# Patient Record
Sex: FEMALE | Race: WHITE | Employment: PART TIME | URBAN - NONMETROPOLITAN AREA
[De-identification: names, ages, dates, MRNs, and addresses within clinical notes are randomized per-mention and may not be internally consistent; named-entity substitution may affect disease eponyms.]

---

## 2017-01-19 ENCOUNTER — OFFICE VISIT (OUTPATIENT)
Dept: FAMILY MEDICINE CLINIC | Age: 52
End: 2017-01-19

## 2017-01-19 VITALS
SYSTOLIC BLOOD PRESSURE: 118 MMHG | DIASTOLIC BLOOD PRESSURE: 84 MMHG | WEIGHT: 113 LBS | HEIGHT: 64 IN | BODY MASS INDEX: 19.29 KG/M2 | HEART RATE: 66 BPM

## 2017-01-19 DIAGNOSIS — F17.200 CURRENT EVERY DAY SMOKER: ICD-10-CM

## 2017-01-19 DIAGNOSIS — Z12.4 ENCOUNTER FOR PAPANICOLAOU SMEAR FOR CERVICAL CANCER SCREENING: ICD-10-CM

## 2017-01-19 DIAGNOSIS — F41.9 ANXIETY: ICD-10-CM

## 2017-01-19 DIAGNOSIS — R45.4 IRRITABILITY: ICD-10-CM

## 2017-01-19 DIAGNOSIS — R23.3 EASY BRUISING: ICD-10-CM

## 2017-01-19 DIAGNOSIS — Z00.01 ENCOUNTER FOR WELL ADULT EXAM WITH ABNORMAL FINDINGS: Primary | ICD-10-CM

## 2017-01-19 DIAGNOSIS — R23.2 HOT FLASHES: ICD-10-CM

## 2017-01-19 PROCEDURE — 99396 PREV VISIT EST AGE 40-64: CPT | Performed by: FAMILY MEDICINE

## 2017-01-19 RX ORDER — VENLAFAXINE HYDROCHLORIDE 37.5 MG/1
37.5 CAPSULE, EXTENDED RELEASE ORAL DAILY
Qty: 30 CAPSULE | Refills: 3 | Status: SHIPPED | OUTPATIENT
Start: 2017-01-19 | End: 2017-05-15 | Stop reason: SINTOL

## 2017-01-19 RX ORDER — NICOTINE 21 MG/24HR
1 PATCH, TRANSDERMAL 24 HOURS TRANSDERMAL EVERY 24 HOURS
Qty: 42 PATCH | Refills: 0 | Status: SHIPPED | OUTPATIENT
Start: 2017-01-19 | End: 2017-07-18

## 2017-01-19 ASSESSMENT — ENCOUNTER SYMPTOMS
CONSTIPATION: 0
COUGH: 0
BLURRED VISION: 0
ABDOMINAL PAIN: 0
SHORTNESS OF BREATH: 0
DIARRHEA: 0
NAUSEA: 0
SORE THROAT: 0
EYE PAIN: 0
VOMITING: 0

## 2017-01-23 LAB
HPV, GENOTYPE 16: NEGATIVE
HPV, GENOTYPE 18: NEGATIVE
OTHER HR HPV GENOTYPES: NEGATIVE

## 2017-01-24 LAB — GYNECOLOGY CYTOLOGY REPORT: NORMAL

## 2017-02-15 ENCOUNTER — TELEPHONE (OUTPATIENT)
Dept: FAMILY MEDICINE CLINIC | Age: 52
End: 2017-02-15

## 2017-02-16 DIAGNOSIS — F51.01 PRIMARY INSOMNIA: Primary | ICD-10-CM

## 2017-02-16 RX ORDER — QUETIAPINE FUMARATE 200 MG/1
200 TABLET, FILM COATED ORAL NIGHTLY
Qty: 30 TABLET | Refills: 2 | Status: SHIPPED | OUTPATIENT
Start: 2017-02-16 | End: 2017-06-19 | Stop reason: SDUPTHER

## 2017-05-15 ENCOUNTER — OFFICE VISIT (OUTPATIENT)
Dept: FAMILY MEDICINE CLINIC | Age: 52
End: 2017-05-15

## 2017-05-15 VITALS
HEIGHT: 64 IN | WEIGHT: 116 LBS | HEART RATE: 66 BPM | BODY MASS INDEX: 19.81 KG/M2 | DIASTOLIC BLOOD PRESSURE: 84 MMHG | SYSTOLIC BLOOD PRESSURE: 128 MMHG

## 2017-05-15 DIAGNOSIS — R68.82 LOW LIBIDO: Primary | ICD-10-CM

## 2017-05-15 DIAGNOSIS — N95.1 HOT FLASHES DUE TO MENOPAUSE: ICD-10-CM

## 2017-05-15 PROCEDURE — 99213 OFFICE O/P EST LOW 20 MIN: CPT | Performed by: FAMILY MEDICINE

## 2017-05-15 ASSESSMENT — ENCOUNTER SYMPTOMS
VOMITING: 0
NAUSEA: 0

## 2017-05-16 ENCOUNTER — TELEPHONE (OUTPATIENT)
Dept: FAMILY MEDICINE CLINIC | Age: 52
End: 2017-05-16

## 2017-05-16 LAB
ABSOLUTE BASO #: 0.1 K/UL (ref 0–0.1)
ABSOLUTE EOS #: 0.2 K/UL (ref 0.1–0.4)
ABSOLUTE LYMPH #: 2.5 K/UL (ref 0.8–5.2)
ABSOLUTE MONO #: 0.5 K/UL (ref 0.1–0.9)
ABSOLUTE NEUT #: 4.6 K/UL (ref 1.3–9.1)
ALBUMIN SERPL-MCNC: 4.3 G/DL (ref 3.2–5.3)
ALK PHOSPHATASE: 94 IU/L (ref 35–121)
ALT SERPL-CCNC: 14 IU/L (ref 5–59)
ANION GAP SERPL CALCULATED.3IONS-SCNC: 14 MMOL/L
AST SERPL-CCNC: 22 IU/L (ref 10–42)
BASOPHILS RELATIVE PERCENT: 0.9 %
BILIRUB SERPL-MCNC: 0.8 MG/DL (ref 0.2–1.3)
BUN BLDV-MCNC: 11 MG/DL (ref 10–20)
CALCIUM SERPL-MCNC: 9.4 MG/DL (ref 8.7–10.8)
CHLORIDE BLD-SCNC: 104 MMOL/L (ref 95–111)
CHOLESTEROL/HDL RATIO: 3.5
CHOLESTEROL: 188 MG/DL
CO2: 27 MMOL/L (ref 21–32)
CREAT SERPL-MCNC: 0.7 MG/DL (ref 0.5–1.3)
EGFR AFRICAN AMERICAN: 107
EGFR IF NONAFRICAN AMERICAN: 89
EOSINOPHILS RELATIVE PERCENT: 3 %
GLUCOSE: 77 MG/DL (ref 70–100)
HCT VFR BLD CALC: 42.9 % (ref 36–48)
HDLC SERPL-MCNC: 54 MG/DL (ref 40–60)
HEMOGLOBIN: 14.1 G/DL (ref 12–16)
LDL CHOLESTEROL CALCULATED: 86 MG/DL
LDL/HDL RATIO: 1.6
LYMPHOCYTE %: 31.7 %
MCH RBC QN AUTO: 29.9 PG (ref 27–34)
MCHC RBC AUTO-ENTMCNC: 32.9 G/DL (ref 31–36)
MCV RBC AUTO: 91.1 FL (ref 80–100)
MONOCYTES # BLD: 6.8 %
NEUTROPHILS RELATIVE PERCENT: 57.2 %
PDW BLD-RTO: 13.1 % (ref 10.8–14.8)
PLATELETS: 219 K/UL (ref 150–450)
POTASSIUM SERPL-SCNC: 4.1 MMOL/L (ref 3.5–5.4)
RBC: 4.71 M/UL (ref 4–5.5)
SODIUM BLD-SCNC: 141 MMOL/L (ref 134–147)
TOTAL PROTEIN: 7.2 G/DL (ref 5.8–8)
TRIGL SERPL-MCNC: 240 MG/DL
VLDLC SERPL CALC-MCNC: 48 MG/DL
WBC: 8 K/UL (ref 3.7–10.8)

## 2017-06-19 ENCOUNTER — OFFICE VISIT (OUTPATIENT)
Dept: FAMILY MEDICINE CLINIC | Age: 52
End: 2017-06-19

## 2017-06-19 VITALS
DIASTOLIC BLOOD PRESSURE: 70 MMHG | HEART RATE: 68 BPM | WEIGHT: 116 LBS | BODY MASS INDEX: 19.91 KG/M2 | SYSTOLIC BLOOD PRESSURE: 128 MMHG

## 2017-06-19 DIAGNOSIS — M54.12 CERVICAL RADICULOPATHY: Primary | ICD-10-CM

## 2017-06-19 DIAGNOSIS — M54.2 NECK PAIN ON LEFT SIDE: ICD-10-CM

## 2017-06-19 DIAGNOSIS — F51.01 PRIMARY INSOMNIA: ICD-10-CM

## 2017-06-19 PROCEDURE — 99213 OFFICE O/P EST LOW 20 MIN: CPT | Performed by: FAMILY MEDICINE

## 2017-06-19 RX ORDER — MULTIVIT-MIN/IRON FUM/FOLIC AC 7.5 MG-4
1 TABLET ORAL DAILY
Qty: 30 TABLET | Refills: 3 | Status: SHIPPED | OUTPATIENT
Start: 2017-06-19 | End: 2017-10-20 | Stop reason: SDUPTHER

## 2017-06-19 RX ORDER — CYCLOBENZAPRINE HCL 10 MG
10 TABLET ORAL 3 TIMES DAILY PRN
Qty: 30 TABLET | Refills: 0 | Status: SHIPPED | OUTPATIENT
Start: 2017-06-19 | End: 2017-06-29

## 2017-06-19 RX ORDER — QUETIAPINE FUMARATE 200 MG/1
200 TABLET, FILM COATED ORAL NIGHTLY
Qty: 30 TABLET | Refills: 2 | Status: SHIPPED | OUTPATIENT
Start: 2017-06-19 | End: 2017-09-21 | Stop reason: SDUPTHER

## 2017-06-20 ENCOUNTER — TELEPHONE (OUTPATIENT)
Dept: FAMILY MEDICINE CLINIC | Age: 52
End: 2017-06-20

## 2017-07-18 ENCOUNTER — OFFICE VISIT (OUTPATIENT)
Dept: FAMILY MEDICINE CLINIC | Age: 52
End: 2017-07-18
Payer: MEDICARE

## 2017-07-18 VITALS
BODY MASS INDEX: 18.95 KG/M2 | HEART RATE: 70 BPM | WEIGHT: 111 LBS | SYSTOLIC BLOOD PRESSURE: 110 MMHG | HEIGHT: 64 IN | DIASTOLIC BLOOD PRESSURE: 66 MMHG

## 2017-07-18 DIAGNOSIS — R45.4 IRRITABILITY: ICD-10-CM

## 2017-07-18 DIAGNOSIS — R23.2 HOT FLASHES: ICD-10-CM

## 2017-07-18 DIAGNOSIS — J42 CHRONIC BRONCHITIS, UNSPECIFIED CHRONIC BRONCHITIS TYPE (HCC): Primary | ICD-10-CM

## 2017-07-18 DIAGNOSIS — F41.9 ANXIETY: ICD-10-CM

## 2017-07-18 PROCEDURE — 99213 OFFICE O/P EST LOW 20 MIN: CPT | Performed by: FAMILY MEDICINE

## 2017-07-18 RX ORDER — ALBUTEROL SULFATE 90 UG/1
2 AEROSOL, METERED RESPIRATORY (INHALATION) EVERY 4 HOURS PRN
Qty: 1 INHALER | Refills: 5 | Status: SHIPPED | OUTPATIENT
Start: 2017-07-18 | End: 2021-10-05 | Stop reason: SDUPTHER

## 2017-07-18 RX ORDER — VENLAFAXINE HYDROCHLORIDE 37.5 MG/1
CAPSULE, EXTENDED RELEASE ORAL
Qty: 60 CAPSULE | Refills: 2 | Status: SHIPPED | OUTPATIENT
Start: 2017-07-18 | End: 2017-10-20 | Stop reason: SDUPTHER

## 2017-07-18 RX ORDER — BUDESONIDE AND FORMOTEROL FUMARATE DIHYDRATE 160; 4.5 UG/1; UG/1
2 AEROSOL RESPIRATORY (INHALATION) 2 TIMES DAILY
Qty: 1 INHALER | Refills: 5 | Status: SHIPPED | OUTPATIENT
Start: 2017-07-18 | End: 2018-02-07 | Stop reason: SDUPTHER

## 2017-07-18 ASSESSMENT — ENCOUNTER SYMPTOMS
CHEST TIGHTNESS: 0
SHORTNESS OF BREATH: 0
FREQUENT THROAT CLEARING: 0
TROUBLE SWALLOWING: 0
HEMOPTYSIS: 0
COLOR CHANGE: 0
WHEEZING: 1
RHINORRHEA: 0
VOMITING: 0
COUGH: 1
NAUSEA: 0

## 2017-07-18 ASSESSMENT — COPD QUESTIONNAIRES: COPD: 1

## 2017-08-01 ENCOUNTER — OFFICE VISIT (OUTPATIENT)
Dept: FAMILY MEDICINE CLINIC | Age: 52
End: 2017-08-01
Payer: MEDICARE

## 2017-08-01 VITALS
DIASTOLIC BLOOD PRESSURE: 70 MMHG | TEMPERATURE: 98.6 F | BODY MASS INDEX: 19.12 KG/M2 | HEART RATE: 62 BPM | SYSTOLIC BLOOD PRESSURE: 100 MMHG | HEIGHT: 64 IN | WEIGHT: 112 LBS

## 2017-08-01 DIAGNOSIS — R35.0 URINARY FREQUENCY: ICD-10-CM

## 2017-08-01 DIAGNOSIS — J44.1 COPD EXACERBATION (HCC): Primary | ICD-10-CM

## 2017-08-01 LAB
BILIRUBIN, POC: NEGATIVE
BLOOD URINE, POC: NEGATIVE
CLARITY, POC: CLEAR
COLOR, POC: YELLOW
GLUCOSE URINE, POC: NEGATIVE
KETONES, POC: NEGATIVE
LEUKOCYTE EST, POC: NORMAL
NITRITE, POC: NEGATIVE
PH, POC: 5.5
PROTEIN, POC: NEGATIVE
SPECIFIC GRAVITY, POC: 1.02
UROBILINOGEN, POC: 0.2

## 2017-08-01 PROCEDURE — 99213 OFFICE O/P EST LOW 20 MIN: CPT | Performed by: FAMILY MEDICINE

## 2017-08-01 RX ORDER — PREDNISONE 20 MG/1
40 TABLET ORAL DAILY
Qty: 10 TABLET | Refills: 0 | Status: SHIPPED | OUTPATIENT
Start: 2017-08-01 | End: 2018-04-04

## 2017-08-01 RX ORDER — AZITHROMYCIN 250 MG/1
TABLET, FILM COATED ORAL
Qty: 6 TABLET | Refills: 0 | Status: SHIPPED | OUTPATIENT
Start: 2017-08-01 | End: 2017-10-17 | Stop reason: ALTCHOICE

## 2017-08-01 ASSESSMENT — ENCOUNTER SYMPTOMS
SORE THROAT: 1
RHINORRHEA: 1
HEARTBURN: 0
DIARRHEA: 0
CHEST TIGHTNESS: 0
COLOR CHANGE: 0
COUGH: 1
VOMITING: 0
SHORTNESS OF BREATH: 1
HEMOPTYSIS: 0

## 2017-09-21 DIAGNOSIS — F51.01 PRIMARY INSOMNIA: ICD-10-CM

## 2017-09-21 RX ORDER — QUETIAPINE FUMARATE 200 MG/1
TABLET, FILM COATED ORAL
Qty: 30 TABLET | Refills: 2 | Status: SHIPPED | OUTPATIENT
Start: 2017-09-21 | End: 2017-12-31 | Stop reason: SDUPTHER

## 2017-10-17 ENCOUNTER — OFFICE VISIT (OUTPATIENT)
Dept: FAMILY MEDICINE CLINIC | Age: 52
End: 2017-10-17
Payer: MEDICARE

## 2017-10-17 VITALS
TEMPERATURE: 98.5 F | BODY MASS INDEX: 18.95 KG/M2 | SYSTOLIC BLOOD PRESSURE: 116 MMHG | WEIGHT: 111 LBS | HEIGHT: 64 IN | HEART RATE: 54 BPM | DIASTOLIC BLOOD PRESSURE: 70 MMHG

## 2017-10-17 DIAGNOSIS — J06.9 VIRAL URI: Primary | ICD-10-CM

## 2017-10-17 PROCEDURE — 99212 OFFICE O/P EST SF 10 MIN: CPT | Performed by: FAMILY MEDICINE

## 2017-10-17 ASSESSMENT — ENCOUNTER SYMPTOMS
SHORTNESS OF BREATH: 0
COUGH: 0
WHEEZING: 0
SORE THROAT: 1

## 2017-10-17 NOTE — PROGRESS NOTES
Known Allergies    Objective:     /70 (Site: Right Arm, Position: Sitting, Cuff Size: Medium Adult)   Pulse 54   Ht 5' 4\" (1.626 m)   Wt 111 lb (50.3 kg)   BMI 19.05 kg/m²   Physical Exam   Constitutional: She is oriented to person, place, and time. She appears cachectic. No distress. Cardiovascular: Bradycardia present. No murmur heard. Pulmonary/Chest: Effort normal and breath sounds normal. No respiratory distress. She has no wheezes. Lymphadenopathy:     She has cervical adenopathy. Neurological: She is alert and oriented to person, place, and time. Vitals reviewed. Impression/Plan:  1. Viral URI  -symptomatic management  -antibiotics are not indicated  -hydration and rest.    -tylenol prn    Educational materials given - see patient instructions. All questions answered. Pt voiced understanding. They agreed with treatment plan. Return in about 2 months (around 12/17/2017) for COPD/sleep.        Electronically signed by Elio Cisneros MD on 10/17/2017 at 11:26 AM

## 2017-10-20 DIAGNOSIS — F41.9 ANXIETY: ICD-10-CM

## 2017-10-20 DIAGNOSIS — R45.4 IRRITABILITY: ICD-10-CM

## 2017-10-20 DIAGNOSIS — R23.2 HOT FLASHES: ICD-10-CM

## 2017-10-20 RX ORDER — VENLAFAXINE HYDROCHLORIDE 37.5 MG/1
CAPSULE, EXTENDED RELEASE ORAL
Qty: 60 CAPSULE | Refills: 2 | Status: SHIPPED | OUTPATIENT
Start: 2017-10-20 | End: 2018-01-30 | Stop reason: SDUPTHER

## 2017-10-20 RX ORDER — FOLIC ACID/MULTIVIT,IRON,MINER 0.4MG-18MG
TABLET ORAL
Qty: 30 TABLET | Refills: 3 | Status: SHIPPED | OUTPATIENT
Start: 2017-10-20 | End: 2018-02-07 | Stop reason: SDUPTHER

## 2017-10-20 NOTE — TELEPHONE ENCOUNTER
Bevely Givens called requesting a refill on the following medications:  Requested Prescriptions     Pending Prescriptions Disp Refills    venlafaxine (EFFEXOR XR) 37.5 MG extended release capsule [Pharmacy Med Name: VENLAFAXINE HCL ER 37.5 MG CAP] 60 capsule 2     Sig: TAKE 1 CAPSULE DAILY FOR 1 WEEK THEN INCREASE TO 2 CAPSULES DAILY    Multiple Vitamins-Minerals (RA ONE DAILY MAXIMUM) TABS [Pharmacy Med Name: RA ONE DAILY MAXIMUM TABLET] 30 tablet 3     Sig: take 1 tablet by mouth once daily       Date of last visit: 10/17/2017  Date of next visit (if applicable):12/18/2017  Date of last refill:   Pharmacy Name:

## 2017-11-13 RX ORDER — OMEPRAZOLE 20 MG/1
20 CAPSULE, DELAYED RELEASE ORAL DAILY
Qty: 30 CAPSULE | Refills: 3 | Status: SHIPPED | OUTPATIENT
Start: 2017-11-13 | End: 2018-03-15 | Stop reason: SDUPTHER

## 2017-11-13 NOTE — TELEPHONE ENCOUNTER
Queen Melissa called requesting a refill on the following medications:  Requested Prescriptions     Pending Prescriptions Disp Refills    omeprazole (PRILOSEC) 20 MG delayed release capsule 30 capsule      Sig: Take 1 capsule by mouth daily     Pharmacy verified:  [email or web address]       Date of last visit: 10-17-17   Date of next visit (if applicable): 61/53/7138        Date of last fill and quantity (to be completed by clinical staff)  Pharm[facility] name: [facility]

## 2017-12-11 ENCOUNTER — TELEPHONE (OUTPATIENT)
Dept: FAMILY MEDICINE CLINIC | Age: 52
End: 2017-12-11

## 2017-12-11 DIAGNOSIS — N64.4 BREAST TENDERNESS: Primary | ICD-10-CM

## 2017-12-12 NOTE — TELEPHONE ENCOUNTER
Spoke with Dante Oquendo- states that both breasts are tender and she has had \"benign lumps\" in the past.

## 2017-12-14 ENCOUNTER — TELEPHONE (OUTPATIENT)
Dept: FAMILY MEDICINE CLINIC | Age: 52
End: 2017-12-14

## 2017-12-14 DIAGNOSIS — N64.4 BREAST TENDERNESS: Primary | ICD-10-CM

## 2017-12-14 NOTE — TELEPHONE ENCOUNTER
Maame from [facility] called and Toni Davidson has an appointment Monday for a Mammo. Divina Miguel said she needs the order to be for the diagnostic Mammo, and also say bilateral US with diagnosis of breast tenderness.

## 2017-12-31 DIAGNOSIS — F51.01 PRIMARY INSOMNIA: ICD-10-CM

## 2018-01-02 ENCOUNTER — TELEPHONE (OUTPATIENT)
Dept: FAMILY MEDICINE CLINIC | Age: 53
End: 2018-01-02

## 2018-01-02 RX ORDER — ACETAMINOPHEN 160 MG
TABLET,CHEWABLE ORAL
Qty: 365 TABLET | Refills: 0 | Status: SHIPPED | OUTPATIENT
Start: 2018-01-02 | End: 2018-12-08 | Stop reason: SDUPTHER

## 2018-01-02 RX ORDER — QUETIAPINE FUMARATE 200 MG/1
TABLET, FILM COATED ORAL
Qty: 90 TABLET | Refills: 0 | Status: SHIPPED | OUTPATIENT
Start: 2018-01-02 | End: 2018-03-15 | Stop reason: SDUPTHER

## 2018-01-13 ENCOUNTER — HOSPITAL ENCOUNTER (EMERGENCY)
Age: 53
Discharge: HOME | End: 2018-01-13
Attending: NURSE PRACTITIONER
Payer: MEDICARE

## 2018-01-13 VITALS
OXYGEN SATURATION: 99 % | DIASTOLIC BLOOD PRESSURE: 60 MMHG | RESPIRATION RATE: 16 BRPM | HEART RATE: 80 BPM | SYSTOLIC BLOOD PRESSURE: 134 MMHG | WEIGHT: 115 LBS | TEMPERATURE: 97.1 F | HEIGHT: 64 IN | BODY MASS INDEX: 19.63 KG/M2

## 2018-01-13 DIAGNOSIS — B86 SCABIES: Primary | ICD-10-CM

## 2018-01-13 PROCEDURE — 99213 OFFICE O/P EST LOW 20 MIN: CPT | Performed by: NURSE PRACTITIONER

## 2018-01-13 PROCEDURE — 99212 OFFICE O/P EST SF 10 MIN: CPT

## 2018-01-13 RX ORDER — PERMETHRIN 50 MG/G
CREAM TOPICAL
Qty: 1 TUBE | Refills: 1 | Status: SHIPPED | OUTPATIENT
Start: 2018-01-13 | End: 2018-12-20

## 2018-01-13 ASSESSMENT — PAIN DESCRIPTION - FREQUENCY: FREQUENCY: CONTINUOUS

## 2018-01-13 ASSESSMENT — PAIN DESCRIPTION - PROGRESSION: CLINICAL_PROGRESSION: NOT CHANGED

## 2018-01-13 ASSESSMENT — PAIN DESCRIPTION - LOCATION: LOCATION: BACK

## 2018-01-13 ASSESSMENT — PAIN SCALES - GENERAL: PAINLEVEL_OUTOF10: 1

## 2018-01-13 ASSESSMENT — PAIN DESCRIPTION - ONSET: ONSET: GRADUAL

## 2018-01-13 ASSESSMENT — PAIN DESCRIPTION - DESCRIPTORS: DESCRIPTORS: SHARP

## 2018-01-13 NOTE — ED PROVIDER NOTES
predniSONE (DELTASONE) 20 MG tablet Take 2 tablets by mouth daily, Disp-10 tablet, R-0Normal      budesonide-formoterol (SYMBICORT) 160-4.5 MCG/ACT AERO Inhale 2 puffs into the lungs 2 times daily, Disp-1 Inhaler, R-5Normal      albuterol sulfate HFA (VENTOLIN HFA) 108 (90 Base) MCG/ACT inhaler Inhale 2 puffs into the lungs every 4 hours as needed for Wheezing, Disp-1 Inhaler, R-5Normal             ALLERGIES     Patient is has No Known Allergies. FAMILY HISTORY     Patient's family history includes Cirrhosis in her maternal uncle; Dementia in her father; Dejuan Blackmon in her mother. SOCIAL HISTORY     Patient  reports that she has been smoking Cigarettes. She has a 37.00 pack-year smoking history. She has never used smokeless tobacco. She reports that she drinks alcohol. She reports that she does not use drugs. PHYSICAL EXAM     ED TRIAGE VITALS  BP: 134/60, Temp: 97.1 °F (36.2 °C), Pulse: 80, Resp: 16, SpO2: 99 %  Physical Exam   Constitutional: She is oriented to person, place, and time. She appears well-developed and well-nourished. No distress. HENT:   Head: Normocephalic. Cardiovascular: Normal rate, regular rhythm and normal heart sounds. Exam reveals no gallop and no friction rub. No murmur heard. Neurological: She is alert and oriented to person, place, and time. Skin: Skin is warm and dry. Rash noted. Rash is papular. Mildly erythematous papular lesions measuring not more than 1-2 mm widely scattered over the back with tracking patterns   Psychiatric: She has a normal mood and affect. Nursing note and vitals reviewed. DIAGNOSTIC RESULTS   Labs:No results found for this visit on 01/13/18.     IMAGING:    URGENT CARE COURSE:     Vitals:    01/13/18 1055   BP: 134/60   Pulse: 80   Resp: 16   Temp: 97.1 °F (36.2 °C)   TempSrc: Temporal   SpO2: 99%   Weight: 115 lb (52.2 kg)   Height: 5' 4\" (1.626 m)       Medications - No data to display  PROCEDURES:  None  FINAL IMPRESSION 1. Scabies        DISPOSITION/PLAN   DISPOSITION Decision To Discharge 01/13/2018 11:11:52 AM   I explained to the patient's pattern of the rash is typical of scabies. I recommended she try Elimite. I'm going to give her a prescription with one refill. I did explain how to use the lotion. She may continue to use and non-fragranced cream or lotion to her skin daily as needed. Follow-up with her primary care provider if symptoms aren't improving or any other concerns.     PATIENT REFERRED TO:  Leland Bianchi MD  [address]  [phone]    Schedule an appointment as soon as possible for a visit   As needed    DISCHARGE MEDICATIONS:  Discharge Medication List as of 1/13/2018 11:14 AM      START taking these medications    Details   permethrin (ELIMITE) 5 % cream Apply topically as directed  May repeat in 7 days if new lesions, Disp-1 Tube, R-1, Print           Discharge Medication List as of 1/13/2018 11:14 AM          LIONEL Knight CNP  01/13/18 4034

## 2018-01-25 ENCOUNTER — OFFICE VISIT (OUTPATIENT)
Dept: FAMILY MEDICINE CLINIC | Age: 53
End: 2018-01-25
Payer: MEDICARE

## 2018-01-25 VITALS
DIASTOLIC BLOOD PRESSURE: 70 MMHG | HEART RATE: 62 BPM | HEIGHT: 64 IN | WEIGHT: 113 LBS | SYSTOLIC BLOOD PRESSURE: 110 MMHG | BODY MASS INDEX: 19.29 KG/M2

## 2018-01-25 DIAGNOSIS — B86 SCABIES: Primary | ICD-10-CM

## 2018-01-25 PROCEDURE — G8427 DOCREV CUR MEDS BY ELIG CLIN: HCPCS | Performed by: FAMILY MEDICINE

## 2018-01-25 PROCEDURE — 3014F SCREEN MAMMO DOC REV: CPT | Performed by: FAMILY MEDICINE

## 2018-01-25 PROCEDURE — G8420 CALC BMI NORM PARAMETERS: HCPCS | Performed by: FAMILY MEDICINE

## 2018-01-25 PROCEDURE — G8482 FLU IMMUNIZE ORDER/ADMIN: HCPCS | Performed by: FAMILY MEDICINE

## 2018-01-25 PROCEDURE — 3017F COLORECTAL CA SCREEN DOC REV: CPT | Performed by: FAMILY MEDICINE

## 2018-01-25 PROCEDURE — 4004F PT TOBACCO SCREEN RCVD TLK: CPT | Performed by: FAMILY MEDICINE

## 2018-01-25 PROCEDURE — 99213 OFFICE O/P EST LOW 20 MIN: CPT | Performed by: FAMILY MEDICINE

## 2018-01-25 RX ORDER — PREDNISONE 20 MG/1
40 TABLET ORAL DAILY
Qty: 10 TABLET | Refills: 0 | Status: SHIPPED | OUTPATIENT
Start: 2018-01-25 | End: 2018-02-09 | Stop reason: SDUPTHER

## 2018-01-25 RX ORDER — IVERMECTIN 3 MG/1
TABLET ORAL
Qty: 6 TABLET | Refills: 0 | Status: SHIPPED | OUTPATIENT
Start: 2018-01-25 | End: 2018-04-16 | Stop reason: ALTCHOICE

## 2018-01-25 ASSESSMENT — ENCOUNTER SYMPTOMS
RHINORRHEA: 0
SHORTNESS OF BREATH: 0
SORE THROAT: 0
DIARRHEA: 0
VOMITING: 0
CHEST TIGHTNESS: 0

## 2018-01-25 NOTE — PATIENT INSTRUCTIONS
help?  Call your doctor now or seek immediate medical care if:  ? · You have signs of infection, such as:  ¨ Increased pain, swelling, warmth, or redness. ¨ Red streaks leading from the mite bites. ¨ Pus draining from a bite area. ¨ A fever. ? Watch closely for changes in your health, and be sure to contact your doctor if:  ? · Anyone else in your family has itching. ? · You do not get better within 2 weeks. Where can you learn more? Go to https://SilMach.Eximia. org and sign in to your KAL account. Enter U497 in the [employer] box to learn more about \"Scabies: Care Instructions. \"     If you do not have an account, please click on the \"Sign Up Now\" link. Current as of: October 13, 2016  Content Version: 11.5  © 3335-7975 Healthwise, Incorporated. Care instructions adapted under license by [facility] ([facility]). If you have questions about a medical condition or this instruction, always ask your healthcare professional. [address] any warranty or liability for your use of this information.

## 2018-01-25 NOTE — PROGRESS NOTES
this visit:    Scabies  -     ivermectin 3 MG TABS; Take 9 mg once and repeat in 1 week. -     predniSONE (DELTASONE) 20 MG tablet; Take 2 tablets by mouth daily    Patient has used Elimite without success so will prescribe Ivermectin and a course of steroids. Advised Benadryl PRN itching. Return if symptoms worsen or fail to improve.     Electronically signed by Edmundo Cristina MD on 1/25/2018 at 10:03 AM

## 2018-01-30 DIAGNOSIS — R45.4 IRRITABILITY: ICD-10-CM

## 2018-01-30 DIAGNOSIS — F41.9 ANXIETY: ICD-10-CM

## 2018-01-30 DIAGNOSIS — R23.2 HOT FLASHES: ICD-10-CM

## 2018-01-30 RX ORDER — VENLAFAXINE HYDROCHLORIDE 37.5 MG/1
CAPSULE, EXTENDED RELEASE ORAL
Qty: 60 CAPSULE | Refills: 2 | Status: SHIPPED | OUTPATIENT
Start: 2018-01-30 | End: 2018-05-02 | Stop reason: SDUPTHER

## 2018-01-31 ENCOUNTER — TELEPHONE (OUTPATIENT)
Dept: FAMILY MEDICINE CLINIC | Age: 53
End: 2018-01-31

## 2018-01-31 DIAGNOSIS — R05.9 COUGH: Primary | ICD-10-CM

## 2018-02-01 RX ORDER — BENZONATATE 100 MG/1
100 CAPSULE ORAL 3 TIMES DAILY PRN
Qty: 21 CAPSULE | Refills: 0 | Status: SHIPPED | OUTPATIENT
Start: 2018-02-01 | End: 2018-02-08

## 2018-02-02 ENCOUNTER — OFFICE VISIT (OUTPATIENT)
Dept: FAMILY MEDICINE CLINIC | Age: 53
End: 2018-02-02
Payer: MEDICARE

## 2018-02-02 VITALS
HEART RATE: 68 BPM | BODY MASS INDEX: 19.29 KG/M2 | HEIGHT: 64 IN | DIASTOLIC BLOOD PRESSURE: 80 MMHG | SYSTOLIC BLOOD PRESSURE: 98 MMHG | WEIGHT: 113 LBS | TEMPERATURE: 97.9 F

## 2018-02-02 DIAGNOSIS — A08.4 VIRAL GASTROENTERITIS: Primary | ICD-10-CM

## 2018-02-02 DIAGNOSIS — J06.9 VIRAL URI: ICD-10-CM

## 2018-02-02 PROCEDURE — 4004F PT TOBACCO SCREEN RCVD TLK: CPT | Performed by: FAMILY MEDICINE

## 2018-02-02 PROCEDURE — 3017F COLORECTAL CA SCREEN DOC REV: CPT | Performed by: FAMILY MEDICINE

## 2018-02-02 PROCEDURE — G8427 DOCREV CUR MEDS BY ELIG CLIN: HCPCS | Performed by: FAMILY MEDICINE

## 2018-02-02 PROCEDURE — G8482 FLU IMMUNIZE ORDER/ADMIN: HCPCS | Performed by: FAMILY MEDICINE

## 2018-02-02 PROCEDURE — 3014F SCREEN MAMMO DOC REV: CPT | Performed by: FAMILY MEDICINE

## 2018-02-02 PROCEDURE — 99213 OFFICE O/P EST LOW 20 MIN: CPT | Performed by: FAMILY MEDICINE

## 2018-02-02 PROCEDURE — G8420 CALC BMI NORM PARAMETERS: HCPCS | Performed by: FAMILY MEDICINE

## 2018-02-02 ASSESSMENT — ENCOUNTER SYMPTOMS
SORE THROAT: 0
SHORTNESS OF BREATH: 0
RHINORRHEA: 1
VOMITING: 1
DIARRHEA: 1
ABDOMINAL PAIN: 0
COUGH: 1

## 2018-02-02 NOTE — PATIENT INSTRUCTIONS
Infection (Cold): Care Instructions  Your Care Instructions    An upper respiratory infection, or URI, is an infection of the nose, sinuses, or throat. URIs are spread by coughs, sneezes, and direct contact. The common cold is the most frequent kind of URI. The flu and sinus infections are other kinds of URIs. Almost all URIs are caused by viruses. Antibiotics won't cure them. But you can treat most infections with home care. This may include drinking lots of fluids and taking over-the-counter pain medicine. You will probably feel better in 4 to 10 days. The doctor has checked you carefully, but problems can develop later. If you notice any problems or new symptoms, get medical treatment right away. Follow-up care is a key part of your treatment and safety. Be sure to make and go to all appointments, and call your doctor if you are having problems. It's also a good idea to know your test results and keep a list of the medicines you take. How can you care for yourself at home? · To prevent dehydration, drink plenty of fluids, enough so that your urine is light yellow or clear like water. Choose water and other caffeine-free clear liquids until you feel better. If you have kidney, heart, or liver disease and have to limit fluids, talk with your doctor before you increase the amount of fluids you drink. · Take an over-the-counter pain medicine, such as acetaminophen (Tylenol), ibuprofen (Advil, Motrin), or naproxen (Aleve). Read and follow all instructions on the label. · Before you use cough and cold medicines, check the label. These medicines may not be safe for young children or for people with certain health problems. · Be careful when taking over-the-counter cold or flu medicines and Tylenol at the same time. Many of these medicines have acetaminophen, which is Tylenol. Read the labels to make sure that you are not taking more than the recommended dose.  Too much acetaminophen (Tylenol) can be

## 2018-02-07 ENCOUNTER — TELEPHONE (OUTPATIENT)
Dept: FAMILY MEDICINE CLINIC | Age: 53
End: 2018-02-07

## 2018-02-07 DIAGNOSIS — R05.9 COUGH: Primary | ICD-10-CM

## 2018-02-07 DIAGNOSIS — J42 CHRONIC BRONCHITIS, UNSPECIFIED CHRONIC BRONCHITIS TYPE (HCC): ICD-10-CM

## 2018-02-07 RX ORDER — BUDESONIDE AND FORMOTEROL FUMARATE DIHYDRATE 160; 4.5 UG/1; UG/1
AEROSOL RESPIRATORY (INHALATION)
Qty: 10.2 G | Refills: 5 | Status: SHIPPED | OUTPATIENT
Start: 2018-02-07 | End: 2021-10-05 | Stop reason: SDUPTHER

## 2018-02-07 RX ORDER — BROMPHENIRAMINE MALEATE, PSEUDOEPHEDRINE HYDROCHLORIDE, AND DEXTROMETHORPHAN HYDROBROMIDE 2; 30; 10 MG/5ML; MG/5ML; MG/5ML
5 SYRUP ORAL 4 TIMES DAILY PRN
Qty: 140 ML | Refills: 0 | Status: SHIPPED | OUTPATIENT
Start: 2018-02-07 | End: 2018-02-14

## 2018-02-07 RX ORDER — FOLIC ACID/MULTIVIT,IRON,MINER 0.4MG-18MG
TABLET ORAL
Qty: 30 TABLET | Refills: 3 | Status: SHIPPED | OUTPATIENT
Start: 2018-02-07 | End: 2018-07-24 | Stop reason: SDUPTHER

## 2018-02-09 ENCOUNTER — TELEPHONE (OUTPATIENT)
Dept: FAMILY MEDICINE CLINIC | Age: 53
End: 2018-02-09

## 2018-02-09 DIAGNOSIS — B86 SCABIES: ICD-10-CM

## 2018-02-09 RX ORDER — PREDNISONE 20 MG/1
40 TABLET ORAL DAILY
Qty: 10 TABLET | Refills: 0 | Status: SHIPPED | OUTPATIENT
Start: 2018-02-09 | End: 2018-04-04

## 2018-02-09 NOTE — TELEPHONE ENCOUNTER
A course of steroids was sent in. If rash does not improve with this, will likely need to see dermatology.

## 2018-03-15 DIAGNOSIS — F51.01 PRIMARY INSOMNIA: ICD-10-CM

## 2018-03-15 RX ORDER — QUETIAPINE FUMARATE 200 MG/1
TABLET, FILM COATED ORAL
Qty: 90 TABLET | Refills: 0 | Status: SHIPPED | OUTPATIENT
Start: 2018-03-15 | End: 2018-07-24 | Stop reason: SDUPTHER

## 2018-03-15 RX ORDER — OMEPRAZOLE 20 MG/1
CAPSULE, DELAYED RELEASE ORAL
Qty: 30 CAPSULE | Refills: 3 | Status: SHIPPED | OUTPATIENT
Start: 2018-03-15 | End: 2018-09-07 | Stop reason: SDUPTHER

## 2018-04-04 ENCOUNTER — OFFICE VISIT (OUTPATIENT)
Dept: FAMILY MEDICINE CLINIC | Age: 53
End: 2018-04-04
Payer: MEDICARE

## 2018-04-04 VITALS
BODY MASS INDEX: 19.63 KG/M2 | WEIGHT: 115 LBS | HEIGHT: 64 IN | DIASTOLIC BLOOD PRESSURE: 62 MMHG | TEMPERATURE: 98.3 F | SYSTOLIC BLOOD PRESSURE: 108 MMHG | HEART RATE: 70 BPM

## 2018-04-04 DIAGNOSIS — J44.1 COPD EXACERBATION (HCC): Primary | ICD-10-CM

## 2018-04-04 DIAGNOSIS — F17.200 CURRENT EVERY DAY SMOKER: ICD-10-CM

## 2018-04-04 PROCEDURE — 3023F SPIROM DOC REV: CPT | Performed by: FAMILY MEDICINE

## 2018-04-04 PROCEDURE — G8926 SPIRO NO PERF OR DOC: HCPCS | Performed by: FAMILY MEDICINE

## 2018-04-04 PROCEDURE — 99214 OFFICE O/P EST MOD 30 MIN: CPT | Performed by: FAMILY MEDICINE

## 2018-04-04 PROCEDURE — G8427 DOCREV CUR MEDS BY ELIG CLIN: HCPCS | Performed by: FAMILY MEDICINE

## 2018-04-04 PROCEDURE — 4004F PT TOBACCO SCREEN RCVD TLK: CPT | Performed by: FAMILY MEDICINE

## 2018-04-04 PROCEDURE — G8420 CALC BMI NORM PARAMETERS: HCPCS | Performed by: FAMILY MEDICINE

## 2018-04-04 PROCEDURE — 3017F COLORECTAL CA SCREEN DOC REV: CPT | Performed by: FAMILY MEDICINE

## 2018-04-04 PROCEDURE — 3014F SCREEN MAMMO DOC REV: CPT | Performed by: FAMILY MEDICINE

## 2018-04-04 RX ORDER — AMOXICILLIN AND CLAVULANATE POTASSIUM 875; 125 MG/1; MG/1
1 TABLET, FILM COATED ORAL 2 TIMES DAILY
Qty: 20 TABLET | Refills: 0 | Status: SHIPPED | OUTPATIENT
Start: 2018-04-04 | End: 2018-04-14

## 2018-04-04 RX ORDER — BENZONATATE 100 MG/1
100 CAPSULE ORAL 3 TIMES DAILY PRN
Qty: 30 CAPSULE | Refills: 0 | Status: SHIPPED | OUTPATIENT
Start: 2018-04-04 | End: 2018-04-14

## 2018-04-04 RX ORDER — PREDNISONE 20 MG/1
40 TABLET ORAL DAILY
Qty: 10 TABLET | Refills: 0 | Status: SHIPPED | OUTPATIENT
Start: 2018-04-04 | End: 2018-04-16 | Stop reason: ALTCHOICE

## 2018-04-04 ASSESSMENT — PATIENT HEALTH QUESTIONNAIRE - PHQ9
SUM OF ALL RESPONSES TO PHQ QUESTIONS 1-9: 0
1. LITTLE INTEREST OR PLEASURE IN DOING THINGS: 0
2. FEELING DOWN, DEPRESSED OR HOPELESS: 0
SUM OF ALL RESPONSES TO PHQ9 QUESTIONS 1 & 2: 0

## 2018-04-04 ASSESSMENT — ENCOUNTER SYMPTOMS
HEMOPTYSIS: 0
VOMITING: 0
EYE DISCHARGE: 0
CHEST TIGHTNESS: 0
WHEEZING: 1
COUGH: 1
RHINORRHEA: 0
COLOR CHANGE: 0
SHORTNESS OF BREATH: 1
NAUSEA: 0
EYE REDNESS: 0

## 2018-04-04 ASSESSMENT — COPD QUESTIONNAIRES: COPD: 1

## 2018-04-09 ENCOUNTER — TELEPHONE (OUTPATIENT)
Dept: FAMILY MEDICINE CLINIC | Age: 53
End: 2018-04-09

## 2018-04-09 DIAGNOSIS — R05.9 COUGH: Primary | ICD-10-CM

## 2018-04-10 ENCOUNTER — HOSPITAL ENCOUNTER (OUTPATIENT)
Dept: GENERAL RADIOLOGY | Age: 53
Discharge: HOME | End: 2018-04-10
Payer: MEDICARE

## 2018-04-10 ENCOUNTER — TELEPHONE (OUTPATIENT)
Dept: FAMILY MEDICINE CLINIC | Age: 53
End: 2018-04-10

## 2018-04-10 ENCOUNTER — HOSPITAL ENCOUNTER (OUTPATIENT)
Age: 53
Discharge: HOME | End: 2018-04-10
Payer: MEDICARE

## 2018-04-10 DIAGNOSIS — R05.9 COUGH: ICD-10-CM

## 2018-04-10 PROCEDURE — 71046 X-RAY EXAM CHEST 2 VIEWS: CPT

## 2018-04-16 ENCOUNTER — APPOINTMENT (OUTPATIENT)
Dept: CT IMAGING | Age: 53
End: 2018-04-16
Payer: MEDICARE

## 2018-04-16 ENCOUNTER — APPOINTMENT (OUTPATIENT)
Dept: GENERAL RADIOLOGY | Age: 53
End: 2018-04-16
Payer: MEDICARE

## 2018-04-16 ENCOUNTER — HOSPITAL ENCOUNTER (EMERGENCY)
Age: 53
Discharge: HOME | End: 2018-04-16
Attending: FAMILY MEDICINE
Payer: MEDICARE

## 2018-04-16 VITALS
SYSTOLIC BLOOD PRESSURE: 108 MMHG | TEMPERATURE: 98 F | RESPIRATION RATE: 20 BRPM | WEIGHT: 115 LBS | BODY MASS INDEX: 19.63 KG/M2 | DIASTOLIC BLOOD PRESSURE: 73 MMHG | HEART RATE: 79 BPM | OXYGEN SATURATION: 96 % | HEIGHT: 64 IN

## 2018-04-16 DIAGNOSIS — J44.1 COPD WITH EXACERBATION (HCC): ICD-10-CM

## 2018-04-16 DIAGNOSIS — R07.89 ATYPICAL CHEST PAIN: Primary | ICD-10-CM

## 2018-04-16 LAB
ALBUMIN SERPL-MCNC: 4.1 G/DL (ref 3.5–5.1)
ALP BLD-CCNC: 78 U/L (ref 38–126)
ALT SERPL-CCNC: 12 U/L (ref 11–66)
ANION GAP SERPL CALCULATED.3IONS-SCNC: 12 MEQ/L (ref 8–16)
APTT: 30.2 SECONDS (ref 22–38)
AST SERPL-CCNC: 15 U/L (ref 5–40)
BASOPHILS # BLD: 1 %
BASOPHILS ABSOLUTE: 0.1 THOU/MM3 (ref 0–0.1)
BILIRUB SERPL-MCNC: 0.3 MG/DL (ref 0.3–1.2)
BUN BLDV-MCNC: 11 MG/DL (ref 7–22)
CALCIUM SERPL-MCNC: 9.3 MG/DL (ref 8.5–10.5)
CHLORIDE BLD-SCNC: 100 MEQ/L (ref 98–111)
CO2: 27 MEQ/L (ref 23–33)
CREAT SERPL-MCNC: 0.5 MG/DL (ref 0.4–1.2)
EKG ATRIAL RATE: 73 BPM
EKG P AXIS: 81 DEGREES
EKG P-R INTERVAL: 148 MS
EKG Q-T INTERVAL: 382 MS
EKG QRS DURATION: 74 MS
EKG QTC CALCULATION (BAZETT): 420 MS
EKG R AXIS: 78 DEGREES
EKG T AXIS: 71 DEGREES
EKG VENTRICULAR RATE: 73 BPM
EOSINOPHIL # BLD: 3.8 %
EOSINOPHILS ABSOLUTE: 0.4 THOU/MM3 (ref 0–0.4)
GFR SERPL CREATININE-BSD FRML MDRD: > 90 ML/MIN/1.73M2
GLUCOSE BLD-MCNC: 183 MG/DL (ref 70–108)
HCT VFR BLD CALC: 41.2 % (ref 37–47)
HEMOGLOBIN: 14 GM/DL (ref 12–16)
INR BLD: 0.88 (ref 0.85–1.13)
LACTIC ACID: 1.5 MMOL/L (ref 0.5–2.2)
LYMPHOCYTES # BLD: 20.7 %
LYMPHOCYTES ABSOLUTE: 2.4 THOU/MM3 (ref 1–4.8)
MCH RBC QN AUTO: 31.1 PG (ref 27–31)
MCHC RBC AUTO-ENTMCNC: 34 GM/DL (ref 33–37)
MCV RBC AUTO: 91.4 FL (ref 81–99)
MONOCYTES # BLD: 8.2 %
MONOCYTES ABSOLUTE: 0.9 THOU/MM3 (ref 0.4–1.3)
NUCLEATED RED BLOOD CELLS: 0 /100 WBC
OSMOLALITY CALCULATION: 281.6 MOSMOL/KG (ref 275–300)
PDW BLD-RTO: 13.1 % (ref 11.5–14.5)
PLATELET # BLD: 274 THOU/MM3 (ref 130–400)
PMV BLD AUTO: 7.4 FL (ref 7.4–10.4)
POTASSIUM SERPL-SCNC: 4 MEQ/L (ref 3.5–5.2)
PRO-BNP: 151 PG/ML (ref 0–900)
RBC # BLD: 4.51 MILL/MM3 (ref 4.2–5.4)
SEG NEUTROPHILS: 66.3 %
SEGMENTED NEUTROPHILS ABSOLUTE COUNT: 7.6 THOU/MM3 (ref 1.8–7.7)
SODIUM BLD-SCNC: 139 MEQ/L (ref 135–145)
TOTAL PROTEIN: 7.2 G/DL (ref 6.1–8)
TROPONIN T: < 0.01 NG/ML
WBC # BLD: 11.5 THOU/MM3 (ref 4.8–10.8)

## 2018-04-16 PROCEDURE — 99285 EMERGENCY DEPT VISIT HI MDM: CPT

## 2018-04-16 PROCEDURE — 85025 COMPLETE CBC W/AUTO DIFF WBC: CPT

## 2018-04-16 PROCEDURE — 80053 COMPREHEN METABOLIC PANEL: CPT

## 2018-04-16 PROCEDURE — 6360000002 HC RX W HCPCS: Performed by: FAMILY MEDICINE

## 2018-04-16 PROCEDURE — 83605 ASSAY OF LACTIC ACID: CPT

## 2018-04-16 PROCEDURE — 85730 THROMBOPLASTIN TIME PARTIAL: CPT

## 2018-04-16 PROCEDURE — 87040 BLOOD CULTURE FOR BACTERIA: CPT

## 2018-04-16 PROCEDURE — 96375 TX/PRO/DX INJ NEW DRUG ADDON: CPT

## 2018-04-16 PROCEDURE — 6370000000 HC RX 637 (ALT 250 FOR IP): Performed by: FAMILY MEDICINE

## 2018-04-16 PROCEDURE — 36415 COLL VENOUS BLD VENIPUNCTURE: CPT

## 2018-04-16 PROCEDURE — 85610 PROTHROMBIN TIME: CPT

## 2018-04-16 PROCEDURE — 93005 ELECTROCARDIOGRAM TRACING: CPT | Performed by: FAMILY MEDICINE

## 2018-04-16 PROCEDURE — 96366 THER/PROPH/DIAG IV INF ADDON: CPT

## 2018-04-16 PROCEDURE — 94640 AIRWAY INHALATION TREATMENT: CPT

## 2018-04-16 PROCEDURE — 71275 CT ANGIOGRAPHY CHEST: CPT

## 2018-04-16 PROCEDURE — 84484 ASSAY OF TROPONIN QUANT: CPT

## 2018-04-16 PROCEDURE — 96365 THER/PROPH/DIAG IV INF INIT: CPT

## 2018-04-16 PROCEDURE — 71046 X-RAY EXAM CHEST 2 VIEWS: CPT

## 2018-04-16 PROCEDURE — 2580000003 HC RX 258: Performed by: FAMILY MEDICINE

## 2018-04-16 PROCEDURE — 83880 ASSAY OF NATRIURETIC PEPTIDE: CPT

## 2018-04-16 PROCEDURE — 6360000004 HC RX CONTRAST MEDICATION: Performed by: FAMILY MEDICINE

## 2018-04-16 RX ORDER — LEVOFLOXACIN 500 MG/1
500 TABLET, FILM COATED ORAL DAILY
Qty: 10 TABLET | Refills: 0 | Status: SHIPPED | OUTPATIENT
Start: 2018-04-16 | End: 2018-04-26

## 2018-04-16 RX ORDER — LEVOFLOXACIN 5 MG/ML
750 INJECTION, SOLUTION INTRAVENOUS ONCE
Status: COMPLETED | OUTPATIENT
Start: 2018-04-16 | End: 2018-04-16

## 2018-04-16 RX ORDER — OXYCODONE HYDROCHLORIDE AND ACETAMINOPHEN 5; 325 MG/1; MG/1
1-2 TABLET ORAL EVERY 6 HOURS PRN
Qty: 10 TABLET | Refills: 0 | Status: SHIPPED | OUTPATIENT
Start: 2018-04-16 | End: 2018-04-23

## 2018-04-16 RX ORDER — IPRATROPIUM BROMIDE AND ALBUTEROL SULFATE 2.5; .5 MG/3ML; MG/3ML
2 SOLUTION RESPIRATORY (INHALATION) ONCE
Status: COMPLETED | OUTPATIENT
Start: 2018-04-16 | End: 2018-04-16

## 2018-04-16 RX ORDER — METHYLPREDNISOLONE SODIUM SUCCINATE 125 MG/2ML
125 INJECTION, POWDER, LYOPHILIZED, FOR SOLUTION INTRAMUSCULAR; INTRAVENOUS ONCE
Status: COMPLETED | OUTPATIENT
Start: 2018-04-16 | End: 2018-04-16

## 2018-04-16 RX ORDER — PREDNISONE 10 MG/1
TABLET ORAL
Qty: 20 TABLET | Refills: 0 | Status: SHIPPED | OUTPATIENT
Start: 2018-04-16 | End: 2018-04-26

## 2018-04-16 RX ORDER — MORPHINE SULFATE 2 MG/ML
2 INJECTION, SOLUTION INTRAMUSCULAR; INTRAVENOUS
Status: DISCONTINUED | OUTPATIENT
Start: 2018-04-16 | End: 2018-04-16 | Stop reason: HOSPADM

## 2018-04-16 RX ORDER — AMOXICILLIN AND CLAVULANATE POTASSIUM 875; 125 MG/1; MG/1
1 TABLET, FILM COATED ORAL 2 TIMES DAILY
COMMUNITY
End: 2018-06-01

## 2018-04-16 RX ORDER — BENZONATATE 100 MG/1
100 CAPSULE ORAL 3 TIMES DAILY PRN
COMMUNITY
End: 2018-05-24 | Stop reason: SDUPTHER

## 2018-04-16 RX ORDER — 0.9 % SODIUM CHLORIDE 0.9 %
1000 INTRAVENOUS SOLUTION INTRAVENOUS ONCE
Status: COMPLETED | OUTPATIENT
Start: 2018-04-16 | End: 2018-04-16

## 2018-04-16 RX ORDER — 0.9 % SODIUM CHLORIDE 0.9 %
1000 INTRAVENOUS SOLUTION INTRAVENOUS ONCE
Status: DISCONTINUED | OUTPATIENT
Start: 2018-04-16 | End: 2018-04-16 | Stop reason: HOSPADM

## 2018-04-16 RX ADMIN — MORPHINE SULFATE 2 MG: 2 INJECTION, SOLUTION INTRAMUSCULAR; INTRAVENOUS at 10:07

## 2018-04-16 RX ADMIN — METHYLPREDNISOLONE SODIUM SUCCINATE 125 MG: 125 INJECTION, POWDER, FOR SOLUTION INTRAMUSCULAR; INTRAVENOUS at 10:07

## 2018-04-16 RX ADMIN — SODIUM CHLORIDE 1000 ML: 9 INJECTION, SOLUTION INTRAVENOUS at 10:06

## 2018-04-16 RX ADMIN — IPRATROPIUM BROMIDE AND ALBUTEROL SULFATE 2 AMPULE: .5; 3 SOLUTION RESPIRATORY (INHALATION) at 09:17

## 2018-04-16 RX ADMIN — LEVOFLOXACIN 750 MG: 5 INJECTION, SOLUTION INTRAVENOUS at 10:07

## 2018-04-16 RX ADMIN — IOPAMIDOL 80 ML: 755 INJECTION, SOLUTION INTRAVENOUS at 10:38

## 2018-04-16 ASSESSMENT — ENCOUNTER SYMPTOMS
PHOTOPHOBIA: 0
FACIAL SWELLING: 0
VOICE CHANGE: 0
SINUS PRESSURE: 0
EYE DISCHARGE: 0
VOMITING: 0
BLOOD IN STOOL: 0
COLOR CHANGE: 0
CONSTIPATION: 0
SORE THROAT: 0
CHOKING: 0
DIARRHEA: 0
WHEEZING: 0
EYE PAIN: 0
TROUBLE SWALLOWING: 0
COUGH: 1
BACK PAIN: 0
NAUSEA: 0
ABDOMINAL DISTENTION: 0
RHINORRHEA: 0
ABDOMINAL PAIN: 0
EYE REDNESS: 0
SHORTNESS OF BREATH: 0

## 2018-04-16 ASSESSMENT — PAIN DESCRIPTION - PROGRESSION: CLINICAL_PROGRESSION: GRADUALLY WORSENING

## 2018-04-16 ASSESSMENT — PAIN DESCRIPTION - DESCRIPTORS: DESCRIPTORS: SHARP

## 2018-04-16 ASSESSMENT — PAIN DESCRIPTION - PAIN TYPE: TYPE: ACUTE PAIN

## 2018-04-16 ASSESSMENT — PAIN DESCRIPTION - ONSET: ONSET: ON-GOING

## 2018-04-16 ASSESSMENT — PAIN SCALES - GENERAL: PAINLEVEL_OUTOF10: 10

## 2018-04-16 ASSESSMENT — PAIN DESCRIPTION - ORIENTATION: ORIENTATION: RIGHT

## 2018-04-16 ASSESSMENT — PAIN DESCRIPTION - LOCATION: LOCATION: CHEST

## 2018-04-16 ASSESSMENT — PAIN DESCRIPTION - FREQUENCY: FREQUENCY: CONTINUOUS

## 2018-04-21 LAB
BLOOD CULTURE, ROUTINE: NORMAL
BLOOD CULTURE, ROUTINE: NORMAL

## 2018-05-02 ENCOUNTER — OFFICE VISIT (OUTPATIENT)
Dept: FAMILY MEDICINE CLINIC | Age: 53
End: 2018-05-02
Payer: MEDICARE

## 2018-05-02 VITALS
WEIGHT: 109 LBS | HEART RATE: 68 BPM | DIASTOLIC BLOOD PRESSURE: 60 MMHG | SYSTOLIC BLOOD PRESSURE: 110 MMHG | HEIGHT: 64 IN | BODY MASS INDEX: 18.61 KG/M2

## 2018-05-02 DIAGNOSIS — F25.0 SCHIZOAFFECTIVE DISORDER, BIPOLAR TYPE WITH GOOD PROGNOSTIC FEATURES (HCC): ICD-10-CM

## 2018-05-02 DIAGNOSIS — F33.1 MODERATE EPISODE OF RECURRENT MAJOR DEPRESSIVE DISORDER (HCC): ICD-10-CM

## 2018-05-02 DIAGNOSIS — J42 CHRONIC BRONCHITIS, UNSPECIFIED CHRONIC BRONCHITIS TYPE (HCC): Primary | ICD-10-CM

## 2018-05-02 DIAGNOSIS — F17.200 CURRENT EVERY DAY SMOKER: ICD-10-CM

## 2018-05-02 DIAGNOSIS — R63.4 WEIGHT LOSS: ICD-10-CM

## 2018-05-02 DIAGNOSIS — R09.1 PLEURITIS: ICD-10-CM

## 2018-05-02 PROCEDURE — 4004F PT TOBACCO SCREEN RCVD TLK: CPT | Performed by: FAMILY MEDICINE

## 2018-05-02 PROCEDURE — 3023F SPIROM DOC REV: CPT | Performed by: FAMILY MEDICINE

## 2018-05-02 PROCEDURE — G8420 CALC BMI NORM PARAMETERS: HCPCS | Performed by: FAMILY MEDICINE

## 2018-05-02 PROCEDURE — G8926 SPIRO NO PERF OR DOC: HCPCS | Performed by: FAMILY MEDICINE

## 2018-05-02 PROCEDURE — 99214 OFFICE O/P EST MOD 30 MIN: CPT | Performed by: FAMILY MEDICINE

## 2018-05-02 PROCEDURE — G8427 DOCREV CUR MEDS BY ELIG CLIN: HCPCS | Performed by: FAMILY MEDICINE

## 2018-05-02 PROCEDURE — 3017F COLORECTAL CA SCREEN DOC REV: CPT | Performed by: FAMILY MEDICINE

## 2018-05-02 RX ORDER — VENLAFAXINE HYDROCHLORIDE 37.5 MG/1
112.5 CAPSULE, EXTENDED RELEASE ORAL DAILY
Qty: 90 CAPSULE | Refills: 2 | Status: SHIPPED | OUTPATIENT
Start: 2018-05-02 | End: 2018-06-01 | Stop reason: SDUPTHER

## 2018-05-02 RX ORDER — MEGESTROL ACETATE 40 MG/ML
400 SUSPENSION ORAL DAILY
Qty: 300 ML | Refills: 3 | Status: SHIPPED | OUTPATIENT
Start: 2018-05-02 | End: 2018-12-20 | Stop reason: ALTCHOICE

## 2018-05-02 ASSESSMENT — ENCOUNTER SYMPTOMS
SHORTNESS OF BREATH: 0
COLOR CHANGE: 0
VOMITING: 0
EYE DISCHARGE: 0
COUGH: 1
EYE REDNESS: 0
NAUSEA: 0

## 2018-05-24 ENCOUNTER — TELEPHONE (OUTPATIENT)
Dept: FAMILY MEDICINE CLINIC | Age: 53
End: 2018-05-24

## 2018-05-24 DIAGNOSIS — R63.6 UNDERWEIGHT DUE TO INADEQUATE CALORIC INTAKE: ICD-10-CM

## 2018-05-24 DIAGNOSIS — R05.9 COUGH: Primary | ICD-10-CM

## 2018-05-24 RX ORDER — BENZONATATE 100 MG/1
100 CAPSULE ORAL 3 TIMES DAILY PRN
Qty: 30 CAPSULE | Refills: 0 | Status: SHIPPED | OUTPATIENT
Start: 2018-05-24 | End: 2018-06-01

## 2018-05-24 RX ORDER — LACTOSE-REDUCED FOOD
1 LIQUID (ML) ORAL DAILY
COMMUNITY
End: 2018-05-24 | Stop reason: SDUPTHER

## 2018-05-24 RX ORDER — LACTOSE-REDUCED FOOD
1 LIQUID (ML) ORAL DAILY
Qty: 90 CAN | Refills: 1 | Status: SHIPPED | OUTPATIENT
Start: 2018-05-24 | End: 2018-09-28

## 2018-06-01 ENCOUNTER — OFFICE VISIT (OUTPATIENT)
Dept: FAMILY MEDICINE CLINIC | Age: 53
End: 2018-06-01
Payer: MEDICARE

## 2018-06-01 VITALS
WEIGHT: 105 LBS | HEIGHT: 64 IN | SYSTOLIC BLOOD PRESSURE: 144 MMHG | HEART RATE: 88 BPM | BODY MASS INDEX: 17.93 KG/M2 | DIASTOLIC BLOOD PRESSURE: 90 MMHG

## 2018-06-01 DIAGNOSIS — F41.9 ANXIETY: ICD-10-CM

## 2018-06-01 DIAGNOSIS — F33.2 SEVERE EPISODE OF RECURRENT MAJOR DEPRESSIVE DISORDER, WITHOUT PSYCHOTIC FEATURES (HCC): Primary | ICD-10-CM

## 2018-06-01 DIAGNOSIS — R63.4 WEIGHT LOSS: ICD-10-CM

## 2018-06-01 DIAGNOSIS — R63.6 UNDERWEIGHT DUE TO INADEQUATE CALORIC INTAKE: ICD-10-CM

## 2018-06-01 PROCEDURE — G8427 DOCREV CUR MEDS BY ELIG CLIN: HCPCS | Performed by: FAMILY MEDICINE

## 2018-06-01 PROCEDURE — 99213 OFFICE O/P EST LOW 20 MIN: CPT | Performed by: FAMILY MEDICINE

## 2018-06-01 PROCEDURE — 4004F PT TOBACCO SCREEN RCVD TLK: CPT | Performed by: FAMILY MEDICINE

## 2018-06-01 PROCEDURE — 3017F COLORECTAL CA SCREEN DOC REV: CPT | Performed by: FAMILY MEDICINE

## 2018-06-01 PROCEDURE — G8419 CALC BMI OUT NRM PARAM NOF/U: HCPCS | Performed by: FAMILY MEDICINE

## 2018-06-01 RX ORDER — HYDROXYZINE HYDROCHLORIDE 25 MG/1
25 TABLET, FILM COATED ORAL EVERY 8 HOURS PRN
Qty: 90 TABLET | Refills: 2 | Status: SHIPPED | OUTPATIENT
Start: 2018-06-01 | End: 2018-07-01

## 2018-06-01 RX ORDER — VENLAFAXINE HYDROCHLORIDE 75 MG/1
150 CAPSULE, EXTENDED RELEASE ORAL DAILY
Qty: 180 CAPSULE | Refills: 0 | Status: SHIPPED | OUTPATIENT
Start: 2018-06-01 | End: 2018-11-14 | Stop reason: DRUGHIGH

## 2018-06-01 ASSESSMENT — ENCOUNTER SYMPTOMS
VOMITING: 1
DIARRHEA: 0
COLOR CHANGE: 0
RHINORRHEA: 0
EYE DISCHARGE: 0
SHORTNESS OF BREATH: 0
NAUSEA: 1
COUGH: 1
CONSTIPATION: 0
EYE REDNESS: 0
CHEST TIGHTNESS: 0

## 2018-07-24 DIAGNOSIS — F51.01 PRIMARY INSOMNIA: ICD-10-CM

## 2018-07-24 RX ORDER — FOLIC ACID/MULTIVIT,IRON,MINER 0.4MG-18MG
TABLET ORAL
Qty: 30 TABLET | Refills: 3 | Status: SHIPPED | OUTPATIENT
Start: 2018-07-24 | End: 2018-12-08 | Stop reason: SDUPTHER

## 2018-07-24 RX ORDER — QUETIAPINE FUMARATE 200 MG/1
TABLET, FILM COATED ORAL
Qty: 90 TABLET | Refills: 0 | Status: SHIPPED | OUTPATIENT
Start: 2018-07-24 | End: 2018-09-28

## 2018-09-07 RX ORDER — VENLAFAXINE HYDROCHLORIDE 37.5 MG/1
CAPSULE, EXTENDED RELEASE ORAL
Qty: 90 CAPSULE | Refills: 2 | Status: SHIPPED | OUTPATIENT
Start: 2018-09-07 | End: 2018-09-28

## 2018-09-07 RX ORDER — OMEPRAZOLE 20 MG/1
CAPSULE, DELAYED RELEASE ORAL
Qty: 30 CAPSULE | Refills: 3 | Status: SHIPPED | OUTPATIENT
Start: 2018-09-07 | End: 2019-01-08

## 2018-09-28 ENCOUNTER — OFFICE VISIT (OUTPATIENT)
Dept: FAMILY MEDICINE CLINIC | Age: 53
End: 2018-09-28
Payer: MEDICARE

## 2018-09-28 VITALS
HEART RATE: 60 BPM | WEIGHT: 113 LBS | DIASTOLIC BLOOD PRESSURE: 72 MMHG | SYSTOLIC BLOOD PRESSURE: 118 MMHG | BODY MASS INDEX: 19.29 KG/M2 | HEIGHT: 64 IN

## 2018-09-28 DIAGNOSIS — K21.9 GASTROESOPHAGEAL REFLUX DISEASE, ESOPHAGITIS PRESENCE NOT SPECIFIED: ICD-10-CM

## 2018-09-28 DIAGNOSIS — F33.2 SEVERE EPISODE OF RECURRENT MAJOR DEPRESSIVE DISORDER, WITHOUT PSYCHOTIC FEATURES (HCC): Primary | ICD-10-CM

## 2018-09-28 DIAGNOSIS — J42 CHRONIC BRONCHITIS, UNSPECIFIED CHRONIC BRONCHITIS TYPE (HCC): ICD-10-CM

## 2018-09-28 DIAGNOSIS — Z23 NEED FOR INFLUENZA VACCINATION: ICD-10-CM

## 2018-09-28 DIAGNOSIS — F41.9 ANXIETY: ICD-10-CM

## 2018-09-28 PROCEDURE — 4004F PT TOBACCO SCREEN RCVD TLK: CPT | Performed by: FAMILY MEDICINE

## 2018-09-28 PROCEDURE — G8926 SPIRO NO PERF OR DOC: HCPCS | Performed by: FAMILY MEDICINE

## 2018-09-28 PROCEDURE — 90688 IIV4 VACCINE SPLT 0.5 ML IM: CPT | Performed by: FAMILY MEDICINE

## 2018-09-28 PROCEDURE — 3017F COLORECTAL CA SCREEN DOC REV: CPT | Performed by: FAMILY MEDICINE

## 2018-09-28 PROCEDURE — 99214 OFFICE O/P EST MOD 30 MIN: CPT | Performed by: FAMILY MEDICINE

## 2018-09-28 PROCEDURE — 90471 IMMUNIZATION ADMIN: CPT | Performed by: FAMILY MEDICINE

## 2018-09-28 PROCEDURE — 3023F SPIROM DOC REV: CPT | Performed by: FAMILY MEDICINE

## 2018-09-28 PROCEDURE — G8427 DOCREV CUR MEDS BY ELIG CLIN: HCPCS | Performed by: FAMILY MEDICINE

## 2018-09-28 PROCEDURE — G8420 CALC BMI NORM PARAMETERS: HCPCS | Performed by: FAMILY MEDICINE

## 2018-09-28 RX ORDER — BUSPIRONE HYDROCHLORIDE 7.5 MG/1
7.5 TABLET ORAL 2 TIMES DAILY
Qty: 60 TABLET | Refills: 2 | Status: SHIPPED | OUTPATIENT
Start: 2018-09-28 | End: 2019-11-19 | Stop reason: SDUPTHER

## 2018-09-28 RX ORDER — BROMPHENIRAMINE MALEATE, PSEUDOEPHEDRINE HYDROCHLORIDE, AND DEXTROMETHORPHAN HYDROBROMIDE 2; 30; 10 MG/5ML; MG/5ML; MG/5ML
5 SYRUP ORAL 4 TIMES DAILY PRN
Qty: 140 ML | Refills: 0 | Status: SHIPPED | OUTPATIENT
Start: 2018-09-28 | End: 2018-12-10 | Stop reason: SDUPTHER

## 2018-09-28 ASSESSMENT — ENCOUNTER SYMPTOMS
VOMITING: 0
EYE REDNESS: 0
WHEEZING: 0
SHORTNESS OF BREATH: 1
RHINORRHEA: 0
EYE DISCHARGE: 0
COUGH: 1
NAUSEA: 0

## 2018-09-28 NOTE — PROGRESS NOTES
Immunization(s) given during visit:    Immunizations     Name Date Dose Route    Influenza, Quadv, 6 mo and older, IM (Flulaval) 9/28/2018 0.5 mL Intramuscular    Site: Deltoid- Right    Lot: [id number]    NDC: 85328-631-33

## 2018-09-28 NOTE — PROGRESS NOTES
[address] [place] [address] [address], [address], [place]  Phone:  [phone]  Fax:  [phone]       Name: Luis Monroe  : 1965    Chief Complaint   Patient presents with    3 Month Follow-Up    COPD       HPI:     HPI  Luis Monroe is a 47 y.o. female who presents today for follow-up of depression, COPD, and GERD. She reports that her mood is worse as her  started drinking again and she recently got fired from her job. She moved out and is living with her daughter and taking care of her 2 yo and 3 yo children. She's pregnant again and due in about 100 days. Zofia Rico is feeling overwhelmed. She stopped taking the Seroquel because it made her too tired. Her breathing has been stable but her cough is productive of sputum and she would like cough medication. She denies increased SOB, but does have CONDE. She continues to smoke about 1 PPD. Reflux has been stable. Her appetite has increased some and she's gaining weight.       Current Outpatient Prescriptions:     busPIRone (BUSPAR) 7.5 MG tablet, Take 1 tablet by mouth 2 times daily, Disp: 60 tablet, Rfl: 2    brompheniramine-pseudoephedrine-DM 30-2-10 MG/5ML syrup, Take 5 mLs by mouth 4 times daily as needed for Cough, Disp: 140 mL, Rfl: 0    omeprazole (PRILOSEC) 20 MG delayed release capsule, take 1 capsule by mouth once daily, Disp: 30 capsule, Rfl: 3    Multiple Vitamins-Minerals (RA ONE DAILY MAXIMUM) TABS, TAKE 1 TABLET BY MOUTH ONCE DAILY, Disp: 30 tablet, Rfl: 3    venlafaxine (EFFEXOR XR) 75 MG extended release capsule, Take 2 capsules by mouth daily, Disp: 180 capsule, Rfl: 0    SYMBICORT 160-4.5 MCG/ACT AERO, inhale 2 puffs by mouth twice a day, Disp: 10.2 g, Rfl: 5    permethrin (ELIMITE) 5 % cream, Apply topically as directed May repeat in 7 days if new lesions, Disp: 1 Tube, Rfl: 1    RA ASPIRIN ADULT LOW STRENGTH 81 MG chewable tablet, chew and swallow 1 tablet by mouth once daily, Disp: 365 tablet, orders for this visit:    Severe episode of recurrent major depressive disorder, without psychotic features ([facility] [address]    Anxiety  -     busPIRone (BUSPAR) 7.5 MG tablet; Take 1 tablet by mouth 2 times daily    Chronic bronchitis, unspecified chronic bronchitis type (HCC)  -     brompheniramine-pseudoephedrine-DM 30-2-10 MG/5ML syrup; Take 5 mLs by mouth 4 times daily as needed for Cough    Gastroesophageal reflux disease, esophagitis presence not specified    Need for influenza vaccination  -     INFLUENZA, QUADV, 6 MO AND OLDER, IM, MDV, 0.5ML ([facility])    Patient's depression and anxiety are worse so will add Buspar. Rx for Bromfed given for cough; no antibiotics needed today as lungs are clear. Patient was advised on smoking cessation. Influenza vaccine administered today. Patient was encouraged to get diagnostic mammogram and PFTs completed. Denise received counseling on the following healthy behaviors: nutrition and exercise. Reviewed prior labs and health maintenance. Continue current medications, diet and exercise. Discussed use, benefit, and side effects of prescribed medications. Barriers to medication compliance addressed. Patient given educational materials - see patient instructions. Requested Prescriptions     Signed Prescriptions Disp Refills    busPIRone (BUSPAR) 7.5 MG tablet 60 tablet 2     Sig: Take 1 tablet by mouth 2 times daily    brompheniramine-pseudoephedrine-DM 30-2-10 MG/5ML syrup 140 mL 0     Sig: Take 5 mLs by mouth 4 times daily as needed for Cough       All patient questions answered. Patient voiced understanding. Quality Measures    Body mass index is 19.4 kg/m². Elevated. Weight control planned discussed Healthy diet and regular exercise. BP: 118/72 Blood pressure is normal. Treatment plan consists of No treatment change needed.     Lab Results   Component Value Date    LDLCALC 86 05/15/2017    (goal LDL reduction with dx if diabetes is 50% LDL reduction)

## 2018-09-28 NOTE — PATIENT INSTRUCTIONS
Patient Education        Learning About Benefits From Quitting Smoking  How does quitting smoking make you healthier? If you're thinking about quitting smoking, you may have a few reasons to be smoke-free. Your health may be one of them. · When you quit smoking, you lower your risks for cancer, lung disease, heart attack, stroke, blood vessel disease, and blindness from macular degeneration. · When you're smoke-free, you get sick less often, and you heal faster. You are less likely to get colds, flu, bronchitis, and pneumonia. · As a nonsmoker, you may find that your mood is better and you are less stressed. When and how will you feel healthier? Quitting has real health benefits that start from day 1 of being smoke-free. And the longer you stay smoke-free, the healthier you get and the better you feel. The first hours  · After just 20 minutes, your blood pressure and heart rate go down. That means there's less stress on your heart and blood vessels. · Within 12 hours, the level of carbon monoxide in your blood drops back to normal. That makes room for more oxygen. With more oxygen in your body, you may notice that you have more energy than when you smoked. After 2 weeks  · Your lungs start to work better. · Your risk of heart attack starts to drop. After 1 month  · When your lungs are clear, you cough less and breathe deeper, so it's easier to be active. · Your sense of taste and smell return. That means you can enjoy food more than you have since you started smoking. Over the years  · After 1 year, your risk of heart disease is half what it would be if you kept smoking. · After 5 years, your risk of stroke starts to shrink. Within a few years after that, it's about the same as if you'd never smoked. · After 10 years, your risk of dying from lung cancer is cut by about half. And your risk for many other types of cancer is lower too. How would quitting help others in your life?   When you quit smoking, you improve the health of everyone who now breathes in your smoke. · Their heart, lung, and cancer risks drop, much like yours. · They are sick less. For babies and small children, living smoke-free means they're less likely to have ear infections, pneumonia, and bronchitis. · If you're a woman who is or will be pregnant someday, quitting smoking means a healthier . · Children who are close to you are less likely to become adult smokers. Where can you learn more? Go to https://KongregatepeKereos.flipClass. org and sign in to your [employer] account. Enter [phone] in the [facility] box to learn more about \"Learning About Benefits From Quitting Smoking. \"     If you do not have an account, please click on the \"Sign Up Now\" link. Current as of: 2017  Content Version: 11.7  © 0546-0841 GdeSlon, Incorporated. Care instructions adapted under license by [facility] ([facility]). If you have questions about a medical condition or this instruction, always ask your healthcare professional. [address] any warranty or liability for your use of this information.

## 2018-10-29 ENCOUNTER — OFFICE VISIT (OUTPATIENT)
Dept: FAMILY MEDICINE CLINIC | Age: 53
End: 2018-10-29
Payer: MEDICARE

## 2018-10-29 VITALS
SYSTOLIC BLOOD PRESSURE: 98 MMHG | WEIGHT: 107.8 LBS | BODY MASS INDEX: 18.5 KG/M2 | TEMPERATURE: 98.3 F | DIASTOLIC BLOOD PRESSURE: 62 MMHG

## 2018-10-29 DIAGNOSIS — R19.7 DIARRHEA OF PRESUMED INFECTIOUS ORIGIN: ICD-10-CM

## 2018-10-29 DIAGNOSIS — B37.9 YEAST INFECTION: ICD-10-CM

## 2018-10-29 DIAGNOSIS — R11.2 NAUSEA AND VOMITING IN ADULT: Primary | ICD-10-CM

## 2018-10-29 PROCEDURE — G8482 FLU IMMUNIZE ORDER/ADMIN: HCPCS | Performed by: FAMILY MEDICINE

## 2018-10-29 PROCEDURE — 4004F PT TOBACCO SCREEN RCVD TLK: CPT | Performed by: FAMILY MEDICINE

## 2018-10-29 PROCEDURE — G8427 DOCREV CUR MEDS BY ELIG CLIN: HCPCS | Performed by: FAMILY MEDICINE

## 2018-10-29 PROCEDURE — 3017F COLORECTAL CA SCREEN DOC REV: CPT | Performed by: FAMILY MEDICINE

## 2018-10-29 PROCEDURE — 99214 OFFICE O/P EST MOD 30 MIN: CPT | Performed by: FAMILY MEDICINE

## 2018-10-29 PROCEDURE — G8420 CALC BMI NORM PARAMETERS: HCPCS | Performed by: FAMILY MEDICINE

## 2018-10-29 RX ORDER — FLUCONAZOLE 150 MG/1
150 TABLET ORAL DAILY
Qty: 1 TABLET | Refills: 0 | Status: SHIPPED | OUTPATIENT
Start: 2018-10-29 | End: 2018-11-09 | Stop reason: SDUPTHER

## 2018-10-29 RX ORDER — LOPERAMIDE HYDROCHLORIDE 2 MG/1
2 CAPSULE ORAL 4 TIMES DAILY PRN
Qty: 40 CAPSULE | Refills: 0 | Status: SHIPPED | OUTPATIENT
Start: 2018-10-29 | End: 2018-11-08

## 2018-10-29 RX ORDER — ONDANSETRON 4 MG/1
4 TABLET, ORALLY DISINTEGRATING ORAL EVERY 8 HOURS PRN
Qty: 15 TABLET | Refills: 0 | Status: SHIPPED | OUTPATIENT
Start: 2018-10-29 | End: 2018-11-03

## 2018-10-29 ASSESSMENT — ENCOUNTER SYMPTOMS
NAUSEA: 1
SHORTNESS OF BREATH: 0
VOMITING: 1
BLOOD IN STOOL: 0
RHINORRHEA: 0
CHEST TIGHTNESS: 0
EYE DISCHARGE: 0
DIARRHEA: 1
COLOR CHANGE: 0
EYE REDNESS: 0
CONSTIPATION: 0

## 2018-11-09 ENCOUNTER — TELEPHONE (OUTPATIENT)
Dept: FAMILY MEDICINE CLINIC | Age: 53
End: 2018-11-09

## 2018-11-09 DIAGNOSIS — B37.9 YEAST INFECTION: ICD-10-CM

## 2018-11-09 RX ORDER — FLUCONAZOLE 150 MG/1
150 TABLET ORAL EVERY OTHER DAY
Qty: 3 TABLET | Refills: 0 | Status: SHIPPED | OUTPATIENT
Start: 2018-11-09 | End: 2018-11-20

## 2018-11-09 NOTE — TELEPHONE ENCOUNTER
Eugene Harirs was treated for a yeast infection with 1 dose of Diflucan. It has not cleared it and she is complaining of continued extreme itching and discharge. She wonders if she can have more. Dr Brooke Vega patient. Uses [facility]. We are to call her back.

## 2018-11-14 ENCOUNTER — OFFICE VISIT (OUTPATIENT)
Dept: FAMILY MEDICINE CLINIC | Age: 53
End: 2018-11-14
Payer: MEDICARE

## 2018-11-14 VITALS
SYSTOLIC BLOOD PRESSURE: 110 MMHG | BODY MASS INDEX: 18.1 KG/M2 | HEIGHT: 64 IN | DIASTOLIC BLOOD PRESSURE: 80 MMHG | WEIGHT: 106 LBS

## 2018-11-14 DIAGNOSIS — Z00.01 ENCOUNTER FOR ROUTINE ADULT MEDICAL EXAM WITH ABNORMAL FINDINGS: Primary | ICD-10-CM

## 2018-11-14 DIAGNOSIS — Z12.31 ENCOUNTER FOR SCREENING MAMMOGRAM FOR BREAST CANCER: ICD-10-CM

## 2018-11-14 DIAGNOSIS — F17.200 CURRENT EVERY DAY SMOKER: ICD-10-CM

## 2018-11-14 DIAGNOSIS — Z12.11 SCREENING FOR COLON CANCER: ICD-10-CM

## 2018-11-14 PROCEDURE — G8482 FLU IMMUNIZE ORDER/ADMIN: HCPCS | Performed by: FAMILY MEDICINE

## 2018-11-14 PROCEDURE — 99396 PREV VISIT EST AGE 40-64: CPT | Performed by: FAMILY MEDICINE

## 2018-11-14 RX ORDER — VENLAFAXINE HYDROCHLORIDE 75 MG/1
225 CAPSULE, EXTENDED RELEASE ORAL DAILY
Qty: 270 CAPSULE | Refills: 0 | Status: SHIPPED
Start: 2018-11-14 | End: 2019-01-08

## 2018-11-14 ASSESSMENT — ENCOUNTER SYMPTOMS
CONSTIPATION: 0
DIARRHEA: 0
COLOR CHANGE: 0
VOMITING: 0
CHEST TIGHTNESS: 0
EYE DISCHARGE: 0
NAUSEA: 0
COUGH: 1

## 2018-11-14 NOTE — PATIENT INSTRUCTIONS
for heart attack and stroke. · Blood pressure. Have your blood pressure checked during a routine doctor visit. Your doctor will tell you how often to check your blood pressure based on your age, your blood pressure results, and other factors. · Mammogram. Ask your doctor how often you should have a mammogram, which is an X-ray of your breasts. A mammogram can spot breast cancer before it can be felt and when it is easiest to treat. · Pap test and pelvic exam. Ask your doctor how often you should have a Pap test. You may not need to have a Pap test as often as you used to. · Vision. Have your eyes checked every year or two or as often as your doctor suggests. Some experts recommend that you have yearly exams for glaucoma and other age-related eye problems starting at age 48. · Hearing. Tell your doctor if you notice any change in your hearing. You can have tests to find out how well you hear. · Diabetes. Ask your doctor whether you should have tests for diabetes. · Colon cancer. You should begin tests for colon cancer at age 48. You may have one of several tests. Your doctor will tell you how often to have tests based on your age and risk. Risks include whether you already had a precancerous polyp removed from your colon or whether your parents, sisters and brothers, or children have had colon cancer. · Thyroid disease. Talk to your doctor about whether to have your thyroid checked as part of a regular physical exam. Women have an increased chance of a thyroid problem. · Osteoporosis. You should begin tests for bone density at age 72. If you are younger than 72, ask your doctor whether you have factors that may increase your risk for this disease. You may want to have this test before age 72. · Heart attack and stroke risk. At least every 4 to 6 years, you should have your risk for heart attack and stroke assessed.  Your doctor uses factors such as your age, blood pressure, cholesterol, and whether you smoke [employer] disclaims any warranty or liability for your use of this information.

## 2018-12-07 ENCOUNTER — TELEPHONE (OUTPATIENT)
Dept: FAMILY MEDICINE CLINIC | Age: 53
End: 2018-12-07

## 2018-12-07 RX ORDER — BENZONATATE 100 MG/1
100 CAPSULE ORAL 3 TIMES DAILY PRN
Qty: 30 CAPSULE | Refills: 0 | Status: SHIPPED | OUTPATIENT
Start: 2018-12-07 | End: 2018-12-17

## 2018-12-10 DIAGNOSIS — J42 CHRONIC BRONCHITIS, UNSPECIFIED CHRONIC BRONCHITIS TYPE (HCC): ICD-10-CM

## 2018-12-10 RX ORDER — FOLIC ACID/MULTIVIT,IRON,MINER 0.4MG-18MG
TABLET ORAL
Qty: 30 TABLET | Refills: 3 | Status: SHIPPED | OUTPATIENT
Start: 2018-12-10 | End: 2019-04-08 | Stop reason: SDUPTHER

## 2018-12-10 RX ORDER — BROMPHENIRAMINE MALEATE, PSEUDOEPHEDRINE HYDROCHLORIDE, AND DEXTROMETHORPHAN HYDROBROMIDE 2; 30; 10 MG/5ML; MG/5ML; MG/5ML
5 SYRUP ORAL 4 TIMES DAILY PRN
Qty: 140 ML | Refills: 0 | COMMUNITY
Start: 2018-12-10 | End: 2018-12-17

## 2018-12-10 RX ORDER — VENLAFAXINE HYDROCHLORIDE 37.5 MG/1
CAPSULE, EXTENDED RELEASE ORAL
Qty: 90 CAPSULE | Refills: 2 | Status: SHIPPED | OUTPATIENT
Start: 2018-12-10 | End: 2019-01-08 | Stop reason: SDUPTHER

## 2018-12-10 RX ORDER — ACETAMINOPHEN 160 MG
TABLET,CHEWABLE ORAL
Qty: 365 TABLET | Refills: 0 | Status: SHIPPED | OUTPATIENT
Start: 2018-12-10 | End: 2019-12-18

## 2018-12-11 DIAGNOSIS — J42 CHRONIC BRONCHITIS, UNSPECIFIED CHRONIC BRONCHITIS TYPE (HCC): ICD-10-CM

## 2018-12-11 RX ORDER — BROMPHENIRAMINE MALEATE, PSEUDOEPHEDRINE HYDROCHLORIDE, AND DEXTROMETHORPHAN HYDROBROMIDE 2; 30; 10 MG/5ML; MG/5ML; MG/5ML
5 SYRUP ORAL 4 TIMES DAILY PRN
Qty: 140 ML | Refills: 0 | Status: CANCELLED | COMMUNITY
Start: 2018-12-11 | End: 2018-12-18

## 2018-12-19 ENCOUNTER — HOSPITAL ENCOUNTER (OUTPATIENT)
Dept: MAMMOGRAPHY | Age: 53
Discharge: HOME | End: 2018-12-19
Payer: MEDICARE

## 2018-12-19 ENCOUNTER — TELEPHONE (OUTPATIENT)
Dept: FAMILY MEDICINE CLINIC | Age: 53
End: 2018-12-19

## 2018-12-19 DIAGNOSIS — Z12.31 ENCOUNTER FOR SCREENING MAMMOGRAM FOR BREAST CANCER: ICD-10-CM

## 2018-12-19 DIAGNOSIS — Z00.01 ENCOUNTER FOR ROUTINE ADULT MEDICAL EXAM WITH ABNORMAL FINDINGS: ICD-10-CM

## 2018-12-19 LAB
ALBUMIN SERPL-MCNC: 4.5 G/DL (ref 3.5–5.1)
ALP BLD-CCNC: 92 U/L (ref 38–126)
ALT SERPL-CCNC: 18 U/L (ref 11–66)
ANION GAP SERPL CALCULATED.3IONS-SCNC: 14 MEQ/L (ref 8–16)
AST SERPL-CCNC: 27 U/L (ref 5–40)
BILIRUB SERPL-MCNC: 0.7 MG/DL (ref 0.3–1.2)
BUN BLDV-MCNC: 6 MG/DL (ref 7–22)
CALCIUM SERPL-MCNC: 10 MG/DL (ref 8.5–10.5)
CHLORIDE BLD-SCNC: 99 MEQ/L (ref 98–111)
CHOLESTEROL, TOTAL: 256 MG/DL (ref 100–199)
CO2: 28 MEQ/L (ref 23–33)
CREAT SERPL-MCNC: 0.7 MG/DL (ref 0.4–1.2)
GFR SERPL CREATININE-BSD FRML MDRD: 88 ML/MIN/1.73M2
GLUCOSE BLD-MCNC: 102 MG/DL (ref 70–108)
HDLC SERPL-MCNC: 66 MG/DL
LDL CHOLESTEROL CALCULATED: 150 MG/DL
POTASSIUM SERPL-SCNC: 4.1 MEQ/L (ref 3.5–5.2)
SODIUM BLD-SCNC: 141 MEQ/L (ref 135–145)
TOTAL PROTEIN: 8.1 G/DL (ref 6.1–8)
TRIGL SERPL-MCNC: 199 MG/DL (ref 0–199)

## 2018-12-19 PROCEDURE — 36415 COLL VENOUS BLD VENIPUNCTURE: CPT

## 2018-12-19 PROCEDURE — 80061 LIPID PANEL: CPT

## 2018-12-19 PROCEDURE — 77063 BREAST TOMOSYNTHESIS BI: CPT

## 2018-12-19 PROCEDURE — 80053 COMPREHEN METABOLIC PANEL: CPT

## 2018-12-19 NOTE — TELEPHONE ENCOUNTER
Informed patient of cholesterol increasing and to  Eat a healthy low fat diet and increase physical activity

## 2018-12-20 ENCOUNTER — OFFICE VISIT (OUTPATIENT)
Dept: FAMILY MEDICINE CLINIC | Age: 53
End: 2018-12-20
Payer: MEDICARE

## 2018-12-20 ENCOUNTER — TELEPHONE (OUTPATIENT)
Dept: FAMILY MEDICINE CLINIC | Age: 53
End: 2018-12-20

## 2018-12-20 ENCOUNTER — HOSPITAL ENCOUNTER (OUTPATIENT)
Age: 53
Discharge: HOME | End: 2018-12-20
Payer: MEDICARE

## 2018-12-20 VITALS
HEART RATE: 60 BPM | BODY MASS INDEX: 18.06 KG/M2 | OXYGEN SATURATION: 96 % | DIASTOLIC BLOOD PRESSURE: 60 MMHG | TEMPERATURE: 98.3 F | WEIGHT: 105.8 LBS | HEIGHT: 64 IN | SYSTOLIC BLOOD PRESSURE: 120 MMHG

## 2018-12-20 DIAGNOSIS — R07.89 CHEST PRESSURE: ICD-10-CM

## 2018-12-20 DIAGNOSIS — J44.1 COPD EXACERBATION (HCC): Primary | ICD-10-CM

## 2018-12-20 DIAGNOSIS — E78.49 OTHER HYPERLIPIDEMIA: ICD-10-CM

## 2018-12-20 LAB
EKG ATRIAL RATE: 82 BPM
EKG P AXIS: 83 DEGREES
EKG P-R INTERVAL: 144 MS
EKG Q-T INTERVAL: 382 MS
EKG QRS DURATION: 78 MS
EKG QTC CALCULATION (BAZETT): 446 MS
EKG R AXIS: 76 DEGREES
EKG T AXIS: 74 DEGREES
EKG VENTRICULAR RATE: 82 BPM

## 2018-12-20 PROCEDURE — 3023F SPIROM DOC REV: CPT | Performed by: FAMILY MEDICINE

## 2018-12-20 PROCEDURE — G8482 FLU IMMUNIZE ORDER/ADMIN: HCPCS | Performed by: FAMILY MEDICINE

## 2018-12-20 PROCEDURE — 93005 ELECTROCARDIOGRAM TRACING: CPT

## 2018-12-20 PROCEDURE — G8419 CALC BMI OUT NRM PARAM NOF/U: HCPCS | Performed by: FAMILY MEDICINE

## 2018-12-20 PROCEDURE — 3017F COLORECTAL CA SCREEN DOC REV: CPT | Performed by: FAMILY MEDICINE

## 2018-12-20 PROCEDURE — G8926 SPIRO NO PERF OR DOC: HCPCS | Performed by: FAMILY MEDICINE

## 2018-12-20 PROCEDURE — 99214 OFFICE O/P EST MOD 30 MIN: CPT | Performed by: FAMILY MEDICINE

## 2018-12-20 PROCEDURE — 93010 ELECTROCARDIOGRAM REPORT: CPT | Performed by: INTERNAL MEDICINE

## 2018-12-20 PROCEDURE — G8427 DOCREV CUR MEDS BY ELIG CLIN: HCPCS | Performed by: FAMILY MEDICINE

## 2018-12-20 PROCEDURE — 4004F PT TOBACCO SCREEN RCVD TLK: CPT | Performed by: FAMILY MEDICINE

## 2018-12-20 RX ORDER — PREDNISONE 20 MG/1
20 TABLET ORAL 2 TIMES DAILY
Qty: 10 TABLET | Refills: 0 | Status: SHIPPED | OUTPATIENT
Start: 2018-12-20 | End: 2019-04-08 | Stop reason: SDUPTHER

## 2018-12-20 RX ORDER — AMOXICILLIN AND CLAVULANATE POTASSIUM 500; 125 MG/1; MG/1
1 TABLET, FILM COATED ORAL 3 TIMES DAILY
Qty: 21 TABLET | Refills: 0 | Status: SHIPPED | OUTPATIENT
Start: 2018-12-20 | End: 2019-04-08 | Stop reason: SDUPTHER

## 2018-12-20 ASSESSMENT — ENCOUNTER SYMPTOMS
WHEEZING: 1
COUGH: 1
SHORTNESS OF BREATH: 0

## 2018-12-20 NOTE — PATIENT INSTRUCTIONS
good.  How is high cholesterol treated? The goal of treatment is to reduce your chances of having a heart attack or stroke. The goal is not to lower your cholesterol numbers only. · You may make lifestyle changes, such as eating healthy foods, not smoking, losing weight, and being more active. · You may have to take medicine. Follow-up care is a key part of your treatment and safety. Be sure to make and go to all appointments, and call your doctor if you are having problems. It's also a good idea to know your test results and keep a list of the medicines you take. Where can you learn more? Go to https://Monarch Teaching Technologiespepiceweb.Nuevo Midstream. org and sign in to your Xytis account. Enter E860 in the [employer] box to learn more about \"Learning About High Cholesterol. \"     If you do not have an account, please click on the \"Sign Up Now\" link. Current as of: December 6, 2017  Content Version: 11.8  © 7518-5144 Healthwise, Incorporated. Care instructions adapted under license by [facility] ([facility]). If you have questions about a medical condition or this instruction, always ask your healthcare professional. [address] any warranty or liability for your use of this information.

## 2018-12-21 DIAGNOSIS — Z12.11 SCREENING FOR COLON CANCER: ICD-10-CM

## 2018-12-21 LAB
CONTROL: PRESENT
HEMOCCULT STL QL: NEGATIVE

## 2018-12-21 PROCEDURE — 82274 ASSAY TEST FOR BLOOD FECAL: CPT | Performed by: FAMILY MEDICINE

## 2019-01-07 ENCOUNTER — HOSPITAL ENCOUNTER (OUTPATIENT)
Dept: WOMENS IMAGING | Age: 54
Discharge: HOME | End: 2019-01-07
Payer: MEDICARE

## 2019-01-07 DIAGNOSIS — R92.2 BREAST DENSITY: ICD-10-CM

## 2019-01-07 PROCEDURE — 77065 DX MAMMO INCL CAD UNI: CPT

## 2019-01-08 ENCOUNTER — OFFICE VISIT (OUTPATIENT)
Dept: FAMILY MEDICINE CLINIC | Age: 54
End: 2019-01-08
Payer: MEDICARE

## 2019-01-08 VITALS
HEART RATE: 84 BPM | SYSTOLIC BLOOD PRESSURE: 100 MMHG | HEIGHT: 64 IN | BODY MASS INDEX: 18.27 KG/M2 | WEIGHT: 107 LBS | DIASTOLIC BLOOD PRESSURE: 60 MMHG

## 2019-01-08 DIAGNOSIS — J42 CHRONIC BRONCHITIS, UNSPECIFIED CHRONIC BRONCHITIS TYPE (HCC): ICD-10-CM

## 2019-01-08 DIAGNOSIS — F51.04 PSYCHOPHYSIOLOGICAL INSOMNIA: ICD-10-CM

## 2019-01-08 DIAGNOSIS — F25.0 SCHIZOAFFECTIVE DISORDER, BIPOLAR TYPE WITH GOOD PROGNOSTIC FEATURES (HCC): ICD-10-CM

## 2019-01-08 DIAGNOSIS — F33.2 SEVERE EPISODE OF RECURRENT MAJOR DEPRESSIVE DISORDER, WITHOUT PSYCHOTIC FEATURES (HCC): Primary | ICD-10-CM

## 2019-01-08 PROCEDURE — G8926 SPIRO NO PERF OR DOC: HCPCS | Performed by: FAMILY MEDICINE

## 2019-01-08 PROCEDURE — G8482 FLU IMMUNIZE ORDER/ADMIN: HCPCS | Performed by: FAMILY MEDICINE

## 2019-01-08 PROCEDURE — G8427 DOCREV CUR MEDS BY ELIG CLIN: HCPCS | Performed by: FAMILY MEDICINE

## 2019-01-08 PROCEDURE — G8419 CALC BMI OUT NRM PARAM NOF/U: HCPCS | Performed by: FAMILY MEDICINE

## 2019-01-08 PROCEDURE — 99213 OFFICE O/P EST LOW 20 MIN: CPT | Performed by: FAMILY MEDICINE

## 2019-01-08 PROCEDURE — 4004F PT TOBACCO SCREEN RCVD TLK: CPT | Performed by: FAMILY MEDICINE

## 2019-01-08 PROCEDURE — 3023F SPIROM DOC REV: CPT | Performed by: FAMILY MEDICINE

## 2019-01-08 PROCEDURE — 3017F COLORECTAL CA SCREEN DOC REV: CPT | Performed by: FAMILY MEDICINE

## 2019-01-08 RX ORDER — VENLAFAXINE HYDROCHLORIDE 150 MG/1
CAPSULE, EXTENDED RELEASE ORAL
Qty: 30 CAPSULE | Refills: 2 | Status: SHIPPED | OUTPATIENT
Start: 2019-01-08 | End: 2019-03-10 | Stop reason: SDUPTHER

## 2019-01-08 RX ORDER — CHOLECALCIFEROL (VITAMIN D3) 125 MCG
5 CAPSULE ORAL DAILY
Qty: 30 TABLET | Refills: 2 | Status: SHIPPED | OUTPATIENT
Start: 2019-01-08 | End: 2019-03-10 | Stop reason: SDUPTHER

## 2019-01-08 ASSESSMENT — ENCOUNTER SYMPTOMS
COUGH: 1
WHEEZING: 0
RHINORRHEA: 0

## 2019-01-15 ENCOUNTER — TELEPHONE (OUTPATIENT)
Dept: FAMILY MEDICINE CLINIC | Age: 54
End: 2019-01-15

## 2019-01-15 ENCOUNTER — HOSPITAL ENCOUNTER (EMERGENCY)
Age: 54
Discharge: HOME | End: 2019-01-15
Payer: MEDICARE

## 2019-01-15 VITALS
RESPIRATION RATE: 16 BRPM | DIASTOLIC BLOOD PRESSURE: 84 MMHG | HEART RATE: 85 BPM | WEIGHT: 107 LBS | HEIGHT: 64 IN | TEMPERATURE: 98 F | BODY MASS INDEX: 18.27 KG/M2 | SYSTOLIC BLOOD PRESSURE: 124 MMHG | OXYGEN SATURATION: 99 %

## 2019-01-15 DIAGNOSIS — F32.A ANXIETY AND DEPRESSION: Primary | ICD-10-CM

## 2019-01-15 DIAGNOSIS — F41.9 ANXIETY AND DEPRESSION: Primary | ICD-10-CM

## 2019-01-15 DIAGNOSIS — M25.511 ACUTE PAIN OF RIGHT SHOULDER: Primary | ICD-10-CM

## 2019-01-15 PROCEDURE — 6360000002 HC RX W HCPCS: Performed by: NURSE PRACTITIONER

## 2019-01-15 PROCEDURE — 99212 OFFICE O/P EST SF 10 MIN: CPT

## 2019-01-15 PROCEDURE — 96372 THER/PROPH/DIAG INJ SC/IM: CPT

## 2019-01-15 PROCEDURE — 99213 OFFICE O/P EST LOW 20 MIN: CPT | Performed by: NURSE PRACTITIONER

## 2019-01-15 RX ORDER — METHYLPREDNISOLONE ACETATE 80 MG/ML
80 INJECTION, SUSPENSION INTRA-ARTICULAR; INTRALESIONAL; INTRAMUSCULAR; SOFT TISSUE ONCE
Status: DISCONTINUED | OUTPATIENT
Start: 2019-01-15 | End: 2019-01-15

## 2019-01-15 RX ORDER — METHYLPREDNISOLONE ACETATE 80 MG/ML
80 INJECTION, SUSPENSION INTRA-ARTICULAR; INTRALESIONAL; INTRAMUSCULAR; SOFT TISSUE ONCE
Status: COMPLETED | OUTPATIENT
Start: 2019-01-15 | End: 2019-01-15

## 2019-01-15 RX ORDER — NAPROXEN 500 MG/1
500 TABLET ORAL 2 TIMES DAILY WITH MEALS
Qty: 30 TABLET | Refills: 0 | Status: SHIPPED | OUTPATIENT
Start: 2019-01-15 | End: 2021-01-04

## 2019-01-15 RX ADMIN — METHYLPREDNISOLONE ACETATE 80 MG: 80 INJECTION, SUSPENSION INTRA-ARTICULAR; INTRALESIONAL; INTRAMUSCULAR; SOFT TISSUE at 15:00

## 2019-01-15 ASSESSMENT — PAIN DESCRIPTION - DESCRIPTORS: DESCRIPTORS: ACHING

## 2019-01-15 ASSESSMENT — ENCOUNTER SYMPTOMS
VOMITING: 0
NAUSEA: 0
DIARRHEA: 0
SHORTNESS OF BREATH: 0

## 2019-01-15 ASSESSMENT — PAIN DESCRIPTION - LOCATION: LOCATION: SHOULDER

## 2019-01-15 ASSESSMENT — PAIN DESCRIPTION - ONSET: ONSET: AWAKENED FROM SLEEP

## 2019-01-15 ASSESSMENT — PAIN - FUNCTIONAL ASSESSMENT: PAIN_FUNCTIONAL_ASSESSMENT: PREVENTS OR INTERFERES SOME ACTIVE ACTIVITIES AND ADLS

## 2019-01-15 ASSESSMENT — PAIN DESCRIPTION - FREQUENCY: FREQUENCY: CONTINUOUS

## 2019-01-15 ASSESSMENT — PAIN DESCRIPTION - ORIENTATION: ORIENTATION: RIGHT

## 2019-01-15 ASSESSMENT — PAIN DESCRIPTION - PROGRESSION: CLINICAL_PROGRESSION: NOT CHANGED

## 2019-01-15 ASSESSMENT — PAIN DESCRIPTION - PAIN TYPE: TYPE: ACUTE PAIN

## 2019-01-15 ASSESSMENT — PAIN SCALES - GENERAL: PAINLEVEL_OUTOF10: 10

## 2019-01-16 ENCOUNTER — TELEPHONE (OUTPATIENT)
Dept: SPIRITUAL SERVICES | Facility: CLINIC | Age: 54
End: 2019-01-16

## 2019-01-21 ENCOUNTER — OFFICE VISIT (OUTPATIENT)
Dept: FAMILY MEDICINE CLINIC | Age: 54
End: 2019-01-21
Payer: MEDICARE

## 2019-01-21 VITALS
HEIGHT: 64 IN | WEIGHT: 105 LBS | SYSTOLIC BLOOD PRESSURE: 110 MMHG | DIASTOLIC BLOOD PRESSURE: 78 MMHG | BODY MASS INDEX: 17.93 KG/M2

## 2019-01-21 DIAGNOSIS — M25.511 ACUTE PAIN OF RIGHT SHOULDER: Primary | ICD-10-CM

## 2019-01-21 DIAGNOSIS — M25.619 LIMITED RANGE OF MOTION (ROM) OF SHOULDER: ICD-10-CM

## 2019-01-21 PROCEDURE — G8427 DOCREV CUR MEDS BY ELIG CLIN: HCPCS | Performed by: FAMILY MEDICINE

## 2019-01-21 PROCEDURE — G8419 CALC BMI OUT NRM PARAM NOF/U: HCPCS | Performed by: FAMILY MEDICINE

## 2019-01-21 PROCEDURE — 3017F COLORECTAL CA SCREEN DOC REV: CPT | Performed by: FAMILY MEDICINE

## 2019-01-21 PROCEDURE — 99213 OFFICE O/P EST LOW 20 MIN: CPT | Performed by: FAMILY MEDICINE

## 2019-01-21 PROCEDURE — G8482 FLU IMMUNIZE ORDER/ADMIN: HCPCS | Performed by: FAMILY MEDICINE

## 2019-01-21 PROCEDURE — 4004F PT TOBACCO SCREEN RCVD TLK: CPT | Performed by: FAMILY MEDICINE

## 2019-01-21 ASSESSMENT — ENCOUNTER SYMPTOMS: COLOR CHANGE: 0

## 2019-02-04 DIAGNOSIS — B37.9 YEAST INFECTION: ICD-10-CM

## 2019-02-04 RX ORDER — FLUCONAZOLE 150 MG/1
TABLET ORAL
Qty: 1 TABLET | Refills: 0 | Status: SHIPPED | OUTPATIENT
Start: 2019-02-04 | End: 2019-04-08

## 2019-02-19 ENCOUNTER — TELEPHONE (OUTPATIENT)
Dept: FAMILY MEDICINE CLINIC | Age: 54
End: 2019-02-19

## 2019-02-19 ENCOUNTER — HOSPITAL ENCOUNTER (OUTPATIENT)
Dept: MRI IMAGING | Age: 54
Discharge: HOME | End: 2019-02-19
Payer: MEDICARE

## 2019-02-19 DIAGNOSIS — M25.619 LIMITED RANGE OF MOTION (ROM) OF SHOULDER: ICD-10-CM

## 2019-02-19 DIAGNOSIS — M75.111 INCOMPLETE TEAR OF RIGHT ROTATOR CUFF: Primary | ICD-10-CM

## 2019-02-19 DIAGNOSIS — M25.511 ACUTE PAIN OF RIGHT SHOULDER: ICD-10-CM

## 2019-02-19 PROCEDURE — 73221 MRI JOINT UPR EXTREM W/O DYE: CPT

## 2019-03-10 DIAGNOSIS — F33.2 SEVERE EPISODE OF RECURRENT MAJOR DEPRESSIVE DISORDER, WITHOUT PSYCHOTIC FEATURES (HCC): ICD-10-CM

## 2019-03-10 DIAGNOSIS — F51.04 PSYCHOPHYSIOLOGICAL INSOMNIA: ICD-10-CM

## 2019-03-11 ENCOUNTER — OFFICE VISIT (OUTPATIENT)
Dept: FAMILY MEDICINE CLINIC | Age: 54
End: 2019-03-11
Payer: MEDICARE

## 2019-03-11 VITALS
BODY MASS INDEX: 17.75 KG/M2 | SYSTOLIC BLOOD PRESSURE: 100 MMHG | TEMPERATURE: 98.4 F | WEIGHT: 104 LBS | DIASTOLIC BLOOD PRESSURE: 80 MMHG | HEIGHT: 64 IN

## 2019-03-11 DIAGNOSIS — A08.4 VIRAL GASTROENTERITIS: Primary | ICD-10-CM

## 2019-03-11 PROCEDURE — 3017F COLORECTAL CA SCREEN DOC REV: CPT | Performed by: FAMILY MEDICINE

## 2019-03-11 PROCEDURE — 4004F PT TOBACCO SCREEN RCVD TLK: CPT | Performed by: FAMILY MEDICINE

## 2019-03-11 PROCEDURE — G8482 FLU IMMUNIZE ORDER/ADMIN: HCPCS | Performed by: FAMILY MEDICINE

## 2019-03-11 PROCEDURE — G8427 DOCREV CUR MEDS BY ELIG CLIN: HCPCS | Performed by: FAMILY MEDICINE

## 2019-03-11 PROCEDURE — G8419 CALC BMI OUT NRM PARAM NOF/U: HCPCS | Performed by: FAMILY MEDICINE

## 2019-03-11 PROCEDURE — 99213 OFFICE O/P EST LOW 20 MIN: CPT | Performed by: FAMILY MEDICINE

## 2019-03-11 RX ORDER — VENLAFAXINE HYDROCHLORIDE 150 MG/1
CAPSULE, EXTENDED RELEASE ORAL
Qty: 30 CAPSULE | Refills: 2 | Status: SHIPPED | OUTPATIENT
Start: 2019-03-11 | End: 2019-06-29 | Stop reason: SDUPTHER

## 2019-03-11 RX ORDER — ONDANSETRON 4 MG/1
4 TABLET, ORALLY DISINTEGRATING ORAL 3 TIMES DAILY PRN
Qty: 21 TABLET | Refills: 0 | Status: SHIPPED | OUTPATIENT
Start: 2019-03-11 | End: 2019-04-08

## 2019-03-11 RX ORDER — PSYLLIUM HUSK 3.4 G/7G
POWDER ORAL
Qty: 30 TABLET | Refills: 2 | Status: SHIPPED | OUTPATIENT
Start: 2019-03-11 | End: 2019-06-29 | Stop reason: SDUPTHER

## 2019-03-11 RX ORDER — VENLAFAXINE HYDROCHLORIDE 37.5 MG/1
CAPSULE, EXTENDED RELEASE ORAL
Qty: 90 CAPSULE | Refills: 2 | Status: SHIPPED | OUTPATIENT
Start: 2019-03-11 | End: 2019-06-29 | Stop reason: SDUPTHER

## 2019-03-11 ASSESSMENT — ENCOUNTER SYMPTOMS
CHEST TIGHTNESS: 0
DIARRHEA: 1
COUGH: 0
ABDOMINAL DISTENTION: 0
ANAL BLEEDING: 0
BLOOD IN STOOL: 0
NAUSEA: 1
COLOR CHANGE: 0
CONSTIPATION: 0
VOMITING: 1

## 2019-04-08 ENCOUNTER — OFFICE VISIT (OUTPATIENT)
Dept: FAMILY MEDICINE CLINIC | Age: 54
End: 2019-04-08
Payer: MEDICARE

## 2019-04-08 VITALS
DIASTOLIC BLOOD PRESSURE: 76 MMHG | TEMPERATURE: 98.2 F | HEART RATE: 73 BPM | WEIGHT: 102.4 LBS | BODY MASS INDEX: 17.48 KG/M2 | SYSTOLIC BLOOD PRESSURE: 116 MMHG | HEIGHT: 64 IN | OXYGEN SATURATION: 94 %

## 2019-04-08 DIAGNOSIS — R63.6 UNDERWEIGHT DUE TO INADEQUATE CALORIC INTAKE: ICD-10-CM

## 2019-04-08 DIAGNOSIS — J44.1 COPD EXACERBATION (HCC): Primary | ICD-10-CM

## 2019-04-08 PROCEDURE — 4004F PT TOBACCO SCREEN RCVD TLK: CPT | Performed by: FAMILY MEDICINE

## 2019-04-08 PROCEDURE — G8926 SPIRO NO PERF OR DOC: HCPCS | Performed by: FAMILY MEDICINE

## 2019-04-08 PROCEDURE — 3023F SPIROM DOC REV: CPT | Performed by: FAMILY MEDICINE

## 2019-04-08 PROCEDURE — 99213 OFFICE O/P EST LOW 20 MIN: CPT | Performed by: FAMILY MEDICINE

## 2019-04-08 PROCEDURE — 3017F COLORECTAL CA SCREEN DOC REV: CPT | Performed by: FAMILY MEDICINE

## 2019-04-08 PROCEDURE — G8419 CALC BMI OUT NRM PARAM NOF/U: HCPCS | Performed by: FAMILY MEDICINE

## 2019-04-08 PROCEDURE — G8427 DOCREV CUR MEDS BY ELIG CLIN: HCPCS | Performed by: FAMILY MEDICINE

## 2019-04-08 RX ORDER — AMOXICILLIN AND CLAVULANATE POTASSIUM 500; 125 MG/1; MG/1
1 TABLET, FILM COATED ORAL 3 TIMES DAILY
Qty: 21 TABLET | Refills: 0 | Status: SHIPPED | OUTPATIENT
Start: 2019-04-08 | End: 2019-04-15

## 2019-04-08 RX ORDER — PREDNISONE 20 MG/1
20 TABLET ORAL 2 TIMES DAILY
Qty: 10 TABLET | Refills: 0 | Status: SHIPPED | OUTPATIENT
Start: 2019-04-08 | End: 2019-04-13

## 2019-04-08 RX ORDER — FOLIC ACID/MULTIVIT,IRON,MINER 0.4MG-18MG
TABLET ORAL
Qty: 30 TABLET | Refills: 3 | Status: SHIPPED | OUTPATIENT
Start: 2019-04-08 | End: 2019-09-25 | Stop reason: SDUPTHER

## 2019-04-08 RX ORDER — BENZONATATE 100 MG/1
100 CAPSULE ORAL 3 TIMES DAILY PRN
Qty: 30 CAPSULE | Refills: 0 | Status: SHIPPED | OUTPATIENT
Start: 2019-04-08 | End: 2019-06-18 | Stop reason: ALTCHOICE

## 2019-04-08 ASSESSMENT — ENCOUNTER SYMPTOMS
WHEEZING: 1
SHORTNESS OF BREATH: 1
COUGH: 1
RHINORRHEA: 1

## 2019-04-08 NOTE — PATIENT INSTRUCTIONS
properly. · If your doctor prescribed antibiotics, take them as directed. Do not stop taking them just because you feel better. You need to take the full course of antibiotics. · If your doctor prescribed oxygen, use the flow rate your doctor has recommended. Do not change it without talking to your doctor first.  · Do not smoke. Smoking makes COPD worse. If you need help quitting, talk to your doctor about stop-smoking programs and medicines. These can increase your chances of quitting for good. When should you call for help? Call 911 anytime you think you may need emergency care. For example, call if:    · You have severe trouble breathing.    Call your doctor now or seek immediate medical care if:    · You have new or worse trouble breathing.     · Your coughing or wheezing gets worse.     · You cough up dark brown or bloody mucus (sputum).     · You have a new or higher fever.    Watch closely for changes in your health, and be sure to contact your doctor if:    · You notice more mucus or a change in the color of your mucus.     · You need to use your antibiotic or steroid pills.     · You do not get better as expected. Where can you learn more? Go to https://Oculo TherapypeDIRAmed.Wordeo. org and sign in to your [employer] account. Enter L956 in the [employer] box to learn more about \"Chronic Obstructive Pulmonary Disease (COPD) Flare-Ups: Care Instructions. \"     If you do not have an account, please click on the \"Sign Up Now\" link. Current as of: September 5, 2018  Content Version: 11.9  © 0494-3971 [employer]Cursada, Incorporated. Care instructions adapted under license by [facility] ([facility]). If you have questions about a medical condition or this instruction, always ask your healthcare professional. [place] [address] any warranty or liability for your use of this information.

## 2019-04-08 NOTE — PROGRESS NOTES
TABS take 1 tablet by mouth daily 30 tablet 3    busPIRone (BUSPAR) 7.5 MG tablet Take 1 tablet by mouth 2 times daily 60 tablet 2    SYMBICORT 160-4.5 MCG/ACT AERO inhale 2 puffs by mouth twice a day 10.2 g 5    albuterol sulfate HFA (VENTOLIN HFA) 108 (90 Base) MCG/ACT inhaler Inhale 2 puffs into the lungs every 4 hours as needed for Wheezing 1 Inhaler 5     No current facility-administered medications for this visit. No Known Allergies    Objective:     /76 (Site: Left Upper Arm, Position: Sitting, Cuff Size: Medium Adult)   Pulse 73   Temp 98.2 °F (36.8 °C) (Oral)   Ht 5' 4\" (1.626 m)   Wt 102 lb 6.4 oz (46.4 kg)   SpO2 94%   BMI 17.58 kg/m²   Physical Exam   Constitutional: She is oriented to person, place, and time. She appears well-developed and well-nourished. HENT:   Right Ear: Tympanic membrane and external ear normal.   Left Ear: Tympanic membrane and external ear normal.   Mouth/Throat: Uvula is midline and oropharynx is clear and moist.   Cardiovascular: Normal rate and regular rhythm. No murmur heard. Pulmonary/Chest: Effort normal. No respiratory distress. She has no decreased breath sounds. She has wheezes (faint expiratory bilaterally). She has no rhonchi. Neurological: She is alert and oriented to person, place, and time. Vitals reviewed. Impression/Plan:  1. COPD exacerbation ([address]  Uncontrolled. Chronic. Increase use of albuterol. Unlikely PNA. Appropriate for outpatient treatment. Start meds:  - predniSONE (DELTASONE) 20 MG tablet; Take 1 tablet by mouth 2 times daily for 5 days  Dispense: 10 tablet; Refill: 0  - amoxicillin-clavulanate (AUGMENTIN) 500-125 MG per tablet; Take 1 tablet by mouth 3 times daily for 7 days  Dispense: 21 tablet; Refill: 0  - benzonatate (TESSALON) 100 MG capsule; Take 1 capsule by mouth 3 times daily as needed for Cough  Dispense: 30 capsule; Refill: 0    2.  Underweight due to inadequate caloric intake  F/u with PCP        They voiced understanding. All questions answered. They agreed with treatment plan. See patient instructions for any educational materials that may have been given. Discussed use, benefit, and side effects of prescribed medications. (Please note that portions of this note may have been completed with a voice recognition program.  Efforts were made to edit the dictation but occasionally words are mis-transcribed.)    Return in about 2 weeks (around 4/22/2019) for low weight.  with felix.        Electronically signed by Ramón Redman MD on 4/8/2019 at 12:17 PM

## 2019-04-18 ENCOUNTER — CARE COORDINATION (OUTPATIENT)
Dept: CARE COORDINATION | Age: 54
End: 2019-04-18

## 2019-04-18 ENCOUNTER — OFFICE VISIT (OUTPATIENT)
Dept: FAMILY MEDICINE CLINIC | Age: 54
End: 2019-04-18
Payer: MEDICARE

## 2019-04-18 VITALS
WEIGHT: 103 LBS | HEART RATE: 78 BPM | SYSTOLIC BLOOD PRESSURE: 102 MMHG | BODY MASS INDEX: 17.58 KG/M2 | HEIGHT: 64 IN | DIASTOLIC BLOOD PRESSURE: 80 MMHG

## 2019-04-18 DIAGNOSIS — J44.1 COPD EXACERBATION (HCC): ICD-10-CM

## 2019-04-18 DIAGNOSIS — F17.200 CURRENT EVERY DAY SMOKER: ICD-10-CM

## 2019-04-18 DIAGNOSIS — F10.10 ALCOHOL CONSUMPTION BINGE DRINKING: ICD-10-CM

## 2019-04-18 DIAGNOSIS — F33.2 SEVERE EPISODE OF RECURRENT MAJOR DEPRESSIVE DISORDER, WITHOUT PSYCHOTIC FEATURES (HCC): Primary | ICD-10-CM

## 2019-04-18 PROCEDURE — 3017F COLORECTAL CA SCREEN DOC REV: CPT | Performed by: FAMILY MEDICINE

## 2019-04-18 PROCEDURE — 4004F PT TOBACCO SCREEN RCVD TLK: CPT | Performed by: FAMILY MEDICINE

## 2019-04-18 PROCEDURE — 3023F SPIROM DOC REV: CPT | Performed by: FAMILY MEDICINE

## 2019-04-18 PROCEDURE — 99214 OFFICE O/P EST MOD 30 MIN: CPT | Performed by: FAMILY MEDICINE

## 2019-04-18 PROCEDURE — G8427 DOCREV CUR MEDS BY ELIG CLIN: HCPCS | Performed by: FAMILY MEDICINE

## 2019-04-18 PROCEDURE — G8926 SPIRO NO PERF OR DOC: HCPCS | Performed by: FAMILY MEDICINE

## 2019-04-18 PROCEDURE — G8419 CALC BMI OUT NRM PARAM NOF/U: HCPCS | Performed by: FAMILY MEDICINE

## 2019-04-18 RX ORDER — PREDNISONE 20 MG/1
40 TABLET ORAL DAILY
Qty: 10 TABLET | Refills: 0 | Status: SHIPPED | OUTPATIENT
Start: 2019-04-18 | End: 2019-05-02 | Stop reason: ALTCHOICE

## 2019-04-18 RX ORDER — BUPROPION HYDROCHLORIDE 150 MG/1
150 TABLET, EXTENDED RELEASE ORAL 2 TIMES DAILY
Qty: 60 TABLET | Refills: 1 | Status: SHIPPED | OUTPATIENT
Start: 2019-04-18 | End: 2019-11-19 | Stop reason: SDUPTHER

## 2019-04-18 RX ORDER — AZITHROMYCIN 250 MG/1
TABLET, FILM COATED ORAL
Qty: 6 TABLET | Refills: 0 | Status: SHIPPED | OUTPATIENT
Start: 2019-04-18 | End: 2019-05-02 | Stop reason: ALTCHOICE

## 2019-04-18 ASSESSMENT — ENCOUNTER SYMPTOMS
EYE REDNESS: 0
DYSPNEA ASSOCIATED WITH: EXERTION
NAUSEA: 0
VOMITING: 0
WHEEZING: 1
COLOR CHANGE: 0
COUGH: 1
SHORTNESS OF BREATH: 1
EYE DISCHARGE: 0

## 2019-04-18 NOTE — CARE COORDINATION
Ambulatory Care Coordination Note  4/18/2019  CM Risk Score: 5  Maria M Mortality Risk Score:      ACC: Bekah Black RN    Summary Note: Spoke briefly with . States he is heading home and will have [address] call me back. PCP referral for care coordination. Call back from [address]. Introduced self and role. Contact information provided. States she is going through a rough time. Daughter and grand kids just moved to [place] and she isn't coping well. Drinking and smoking more.  and herself are living in apartment and paying rent weekly.  not working now. Financial problems. Social work referral to [employer] and update given. Discussed depression with Denise. Doesn't have a psy. States she called  and left message. Discussed options for [employer]. Would like depression managed by PCP for now and will talk with . Agreed to report worsening of depression or suicidal ideation. Agreed to try to cut down on cigarettes and alcohol. COPD:  Started abx and prednisone. Reports smoking more. Discussed cutting down or setting a quit. Discussed zone management and symptom monitoring. Advised to report worsening of symptoms. Encouraged to call with any issues.          Care Coordination Interventions    Program Enrollment:  Rising Risk  Referral from Primary Care Provider:  Yes  Suggested Interventions and [address]:  Declined  Social Work:  Completed  Other Services:  Completed (Comment: Spiritual counseling)         Goals Addressed                 This Visit's Progress     Behavioral Health        I will work towards the following [address] [place] [address] goals: I will schedule a new appointment to establish care with a psychologist/counselor and/or psychiatrist., I will continue to follow up with my psychologist /counselor and/or psychiatrist., I will schedule my follow up appointment with my primary care physician when I leave the office today., I will take my medications daily as prescribed. , I will seek treatment for alcohol/substance use., I will increase my cardiovascular exercise to 30 minutes a day, 3-5 days per week., I will work on improving sleep habits to have consistent sleep/awake time., I will decrease caffeine intake. and I will take a multivitamin daily. Barriers: impairment:  mental health: depression, fear of failure, financial and stress  Plan for overcoming my barriers: ACC support, SW referral, Spiritual counseling  Confidence: 7/10  Anticipated Goal Completion Date: 7/18/19       Conditions and Symptoms        I will schedule office visits, as directed by my provider. I will keep my appointment or reschedule if I have to cancel. I will notify my provider of any barriers to my plan of care. I will follow my Zone Management tool to seek urgent or emergent care. I will notify my provider of any symptoms that indicate a worsening of my condition. Barriers: impairment:  mental health: depression, fear of failure, financial and stress  Plan for overcoming my barriers: ACC support, SW referral  Confidence: 7/10  Anticipated Goal Completion Date: 7/19/19              Prior to Admission medications    Medication Sig Start Date End Date Taking? Authorizing Provider   buPROPion (ZYBAN) 150 MG extended release tablet Take 1 tablet by mouth 2 times daily Take 1/2 tab daily for 3 days, then increase to 1 tab twice daily. 4/18/19   Colette Hennessy MD   azithromycin (ZITHROMAX Z-NICK) 250 MG tablet Take two (2) tablets by mouth on day 1, then take one (1) tablet by mouth daily for four (4) days.  4/18/19   Colette Hennessy MD   predniSONE (DELTASONE) 20 MG tablet Take 2 tablets by mouth daily 4/18/19   Colette Hennessy MD   benzonatate (TESSALON) 100 MG capsule Take 1 capsule by mouth 3 times daily as needed for Cough 4/8/19   Jim Brown MD   Multiple Vitamins-Minerals (RA ONE DAILY MAXIMUM) TABS take 1 tablet by mouth once daily 4/8/19   Moises Began, MD CATES MELATONIN 5 MG TABS tablet take 1 tablet by mouth once daily 3/11/19   Lucrezia Began, MD   venlafaxine (EFFEXOR XR) 37.5 MG extended release capsule take 3 capsules by mouth once daily 3/11/19   Lucrezia Began, MD   venlafaxine (EFFEXOR XR) 150 MG extended release capsule take 1 capsule by mouth once daily 3/11/19   Lucrezia Began, MD   naproxen (EC NAPROSYN) 500 MG EC tablet Take 1 tablet by mouth 2 times daily (with meals) 1/15/19   Alexian Deland Case, APRN - CNP   RA ASPIRIN ADULT LOW STRENGTH 81 MG chewable tablet chew and swallow 1 tablet by mouth once daily 12/10/18   Lucrezia Began, MD   busPIRone (BUSPAR) 7.5 MG tablet Take 1 tablet by mouth 2 times daily 9/28/18   Lucrezia Began, MD   SYMBICORT 160-4.5 MCG/ACT AERO inhale 2 puffs by mouth twice a day 2/7/18   Lucrezia Began, MD   albuterol sulfate HFA (VENTOLIN HFA) 108 (90 Base) MCG/ACT inhaler Inhale 2 puffs into the lungs every 4 hours as needed for Wheezing 7/18/17   Moises Began, MD       Future Appointments   Date Time Provider Alley Olivares   4/22/2019 11:15 AM Moises Began,  [facility]      and   COPD Assessment    Does the patient understand envrionmental exposure?:  Yes  Is the patient able to verbalize Rescue vs. Long Acting medications?:  Yes  Does the patient have a nebulizer?:  No     Increase in cough, Shortness of breath (worse than baseline)         Symptoms:   COPD associated wheezing: Pos      Symptom course:  no change  Breathlessness:  exertion  Increase use of rapid acting/rescue inhaled medications?:  Yes  Change in chronic cough?:  Increased  Change in sputum?:  Increased  Have you had a recent diagnosis of pneumonia either by PCP or at a hospital?:  No

## 2019-04-18 NOTE — PROGRESS NOTES
[address] [place] [address] Rd, [address], [place]  Phone:  [phone]  NUA:[phone]       Name: Tila Montaño  : 1965    Chief Complaint   Patient presents with    Depression     very depressed   crying      Cough       HPI:     HPI  Tila Montaño is a 47 y.o. female who presents today for follow-up of depression and a cough. Her daughter lived in the apartment across the [place] and just got evicted because she had too many children in her apartment. Kerry Adams is upset because she's helped raise her 3 grandchildren and now her daughter moved with them to [place] to be with her father. This week Denise's been drinking 4 24 oz beers/day and is smoking 2 PPD. She is crying herself to sleep. She has no suicidal or homicidal ideation. She's been coughing more, wheezing more, and is more SOB. She's recently been on antibiotics and steroids which helped some, but she's feeling worse again, likely because of her increased tobacco intake. Current Outpatient Medications:     buPROPion (ZYBAN) 150 MG extended release tablet, Take 1 tablet by mouth 2 times daily Take 1/2 tab daily for 3 days, then increase to 1 tab twice daily. , Disp: 60 tablet, Rfl: 1    azithromycin (ZITHROMAX Z-NICK) 250 MG tablet, Take two (2) tablets by mouth on day 1, then take one (1) tablet by mouth daily for four (4) days. , Disp: 6 tablet, Rfl: 0    predniSONE (DELTASONE) 20 MG tablet, Take 2 tablets by mouth daily, Disp: 10 tablet, Rfl: 0    benzonatate (TESSALON) 100 MG capsule, Take 1 capsule by mouth 3 times daily as needed for Cough, Disp: 30 capsule, Rfl: 0    Multiple Vitamins-Minerals (RA ONE DAILY MAXIMUM) TABS, take 1 tablet by mouth once daily, Disp: 30 tablet, Rfl: 3    RA MELATONIN 5 MG TABS tablet, take 1 tablet by mouth once daily, Disp: 30 tablet, Rfl: 2    venlafaxine (EFFEXOR XR) 37.5 MG extended release capsule, take 3 capsules by mouth once daily, Disp: 90 capsule, Rfl: 2   venlafaxine (EFFEXOR XR) 150 MG extended release capsule, take 1 capsule by mouth once daily, Disp: 30 capsule, Rfl: 2    naproxen (EC NAPROSYN) 500 MG EC tablet, Take 1 tablet by mouth 2 times daily (with meals), Disp: 30 tablet, Rfl: 0    RA ASPIRIN ADULT LOW STRENGTH 81 MG chewable tablet, chew and swallow 1 tablet by mouth once daily, Disp: 365 tablet, Rfl: 0    busPIRone (BUSPAR) 7.5 MG tablet, Take 1 tablet by mouth 2 times daily, Disp: 60 tablet, Rfl: 2    SYMBICORT 160-4.5 MCG/ACT AERO, inhale 2 puffs by mouth twice a day, Disp: 10.2 g, Rfl: 5    albuterol sulfate HFA (VENTOLIN HFA) 108 (90 Base) MCG/ACT inhaler, Inhale 2 puffs into the lungs every 4 hours as needed for Wheezing, Disp: 1 Inhaler, Rfl: 5    No Known Allergies    Subjective:      Review of Systems   Constitutional: Negative for chills and fever. Eyes: Negative for discharge and redness. Respiratory: Positive for cough, shortness of breath and wheezing. Cardiovascular: Negative for chest pain and palpitations. Gastrointestinal: Negative for nausea and vomiting. Skin: Negative for color change and rash. Allergic/Immunologic: Negative for environmental allergies and food allergies. Neurological: Negative for dizziness and headaches. Hematological: Negative for adenopathy. Does not bruise/bleed easily. Psychiatric/Behavioral: Positive for dysphoric mood. Negative for self-injury and suicidal ideas. The patient is nervous/anxious. Objective:     /80 (Site: Left Upper Arm, Position: Sitting, Cuff Size: Small Adult)   Pulse 78   Ht 5' 4\" (1.626 m)   Wt 103 lb (46.7 kg)   BMI 17.68 kg/m²     Physical Exam   Constitutional: She is oriented to person, place, and time. She appears well-developed and well-nourished. No distress. HENT:   Head: Normocephalic and atraumatic. Nose: Nose normal.   Eyes: Conjunctivae and EOM are normal.   Neck: Normal range of motion. Neck supple.    Cardiovascular: Normal rate and regular rhythm. Pulmonary/Chest: Effort normal. No respiratory distress. She has wheezes. Abdominal: Soft. Bowel sounds are normal.   Neurological: She is alert and oriented to person, place, and time. Skin: Skin is warm and dry. Psychiatric: Her behavior is normal. Her mood appears anxious. She exhibits a depressed mood. Crying. Nursing note and vitals reviewed. Assessment/Plan:     Ibeth Quan was seen today for depression and cough. Diagnoses and all orders for this visit:    Severe episode of recurrent major depressive disorder, without psychotic features ([address]  -     Mood has acutely worsened as her daughter and grandchildren moved out of the state. Will add Zyban to help with mood. She will contact the  to meet for counciling.    -     buPROPion (ZYBAN) 150 MG extended release tablet; Take 1 tablet by mouth 2 times daily Take 1/2 tab daily for 3 days, then increase to 1 tab twice daily. Current every day smoker  -     Will start Zyban to help with mood and smoking cessation.  -     buPROPion (ZYBAN) 150 MG extended release tablet; Take 1 tablet by mouth 2 times daily Take 1/2 tab daily for 3 days, then increase to 1 tab twice daily. Alcohol consumption binge drinking        -     Patient was advised to cut down/quit drinking. COPD exacerbation (HCC)  -     azithromycin (ZITHROMAX Z-NICK) 250 MG tablet; Take two (2) tablets by mouth on day 1, then take one (1) tablet by mouth daily for four (4) days. -     predniSONE (DELTASONE) 20 MG tablet; Take 2 tablets by mouth daily      Return in about 1 month (around 5/18/2019) for depression.     Electronically signed by Mariama Gonzalez MD on 4/18/2019 at 10:08 AM

## 2019-04-18 NOTE — PATIENT INSTRUCTIONS
Take part in a [employer] activity or other social gathering. Go to a [employer] game. · Ask a friend to have dinner with you. Take care of yourself  · Eat a balanced diet with plenty of fresh fruits and vegetables, whole grains, and lean protein. If you have lost your appetite, eat small snacks rather than large meals. · Avoid drinking alcohol or using illegal drugs. Do not take medicines that have not been prescribed for you. They may interfere with medicines you may be taking for depression, or they may make your depression worse. · Take your medicines exactly as they are prescribed. You may start to feel better within 1 to 3 weeks of taking antidepressant medicine. But it can take as many as 6 to 8 weeks to see more improvement. If you have questions or concerns about your medicines, or if you do not notice any improvement by 3 weeks, talk to your doctor. · If you have any side effects from your medicine, tell your doctor. Antidepressants can make you feel tired, dizzy, or nervous. Some people have dry mouth, constipation, headaches, sexual problems, or diarrhea. Many of these side effects are mild and will go away on their own after you have been taking the medicine for a few weeks. Some may last longer. Talk to your doctor if side effects are bothering you too much. You might be able to try a different medicine. · Get enough sleep. If you have problems sleeping:  ? Go to bed at the same time every night, and get up at the same time every morning. ? Keep your bedroom dark and quiet. ? Do not exercise after 5:00 p.m.  ? Avoid drinks with caffeine after 5:00 p.m. · Avoid sleeping pills unless they are prescribed by the doctor treating your depression. Sleeping pills may make you groggy during the day, and they may interact with other medicine you are taking. · If you have any other illnesses, such as diabetes, heart disease, or high blood pressure, make sure to continue with your treatment.  Tell your doctor about

## 2019-04-22 ENCOUNTER — CARE COORDINATION (OUTPATIENT)
Dept: CARE COORDINATION | Age: 54
End: 2019-04-22

## 2019-04-22 NOTE — CARE COORDINATION
I called pt on her phone as well as her husbands. Left vm on both to return my call. If I do not hear from them I will try again after I return from [facility] on 5/2.

## 2019-04-25 ENCOUNTER — CARE COORDINATION (OUTPATIENT)
Dept: CARE COORDINATION | Age: 54
End: 2019-04-25

## 2019-04-25 ASSESSMENT — ENCOUNTER SYMPTOMS: DYSPNEA ASSOCIATED WITH: EXERTION

## 2019-04-25 NOTE — CARE COORDINATION
support, SW referral, Spiritual counseling  Confidence: 7/10  Anticipated Goal Completion Date: 7/18/19       Conditions and Symptoms   Improving     I will schedule office visits, as directed by my provider. I will keep my appointment or reschedule if I have to cancel. I will notify my provider of any barriers to my plan of care. I will follow my Zone Management tool to seek urgent or emergent care. I will notify my provider of any symptoms that indicate a worsening of my condition. Barriers: impairment:  mental health: depression, fear of failure, financial and stress  Plan for overcoming my barriers: ACC support, SW referral, smoking cessation  Confidence: 7/10  Anticipated Goal Completion Date: 7/19/19              Prior to Admission medications    Medication Sig Start Date End Date Taking? Authorizing Provider   buPROPion (ZYBAN) 150 MG extended release tablet Take 1 tablet by mouth 2 times daily Take 1/2 tab daily for 3 days, then increase to 1 tab twice daily. 4/18/19   Emery Conway MD   azithromycin (ZITHROMAX Z-NICK) 250 MG tablet Take two (2) tablets by mouth on day 1, then take one (1) tablet by mouth daily for four (4) days.  4/18/19   Emery Conway MD   predniSONE (DELTASONE) 20 MG tablet Take 2 tablets by mouth daily 4/18/19   Emery Conway MD   benzonatate (TESSALON) 100 MG capsule Take 1 capsule by mouth 3 times daily as needed for Cough 4/8/19   Dalbert Skiff, MD   Multiple Vitamins-Minerals (RA ONE DAILY MAXIMUM) TABS take 1 tablet by mouth once daily 4/8/19   Emery Conway MD   RA MELATONIN 5 MG TABS tablet take 1 tablet by mouth once daily 3/11/19   Emery Conway MD   venlafaxine (EFFEXOR XR) 37.5 MG extended release capsule take 3 capsules by mouth once daily 3/11/19   Emery Conway MD   venlafaxine (EFFEXOR XR) 150 MG extended release capsule take 1 capsule by mouth once daily 3/11/19   Emery Conway MD   naproxen (EC NAPROSYN) 500 MG EC tablet Take 1 tablet by mouth 2 times daily (with meals) 1/15/19   Promise Holbrookon Nick, APRN - CNP   RA ASPIRIN ADULT LOW STRENGTH 81 MG chewable tablet chew and swallow 1 tablet by mouth once daily 12/10/18   Kathya Vyas MD   busPIRone (BUSPAR) 7.5 MG tablet Take 1 tablet by mouth 2 times daily 9/28/18   Kathya Vyas MD   SYMBICORT 160-4.5 MCG/ACT AERO inhale 2 puffs by mouth twice a day 2/7/18   Kathya Vyas MD   albuterol sulfate HFA (VENTOLIN HFA) 108 (90 Base) MCG/ACT inhaler Inhale 2 puffs into the lungs every 4 hours as needed for Wheezing 7/18/17   Kathya Vyas MD       No future appointments.    and   COPD Assessment    Does the patient understand envrionmental exposure?:  Yes  Is the patient able to verbalize Rescue vs. Long Acting medications?:  Yes  Does the patient have a nebulizer?:  No     No patient-reported symptoms         Symptoms:   None:  Yes      Symptom course:  improving  Breathlessness:  exertion  Increase use of rapid acting/rescue inhaled medications?:  No  Change in chronic cough?:  No/At Baseline  Change in sputum?:  No/At Baseline  Self Monitoring - SaO2:  No  Have you had a recent diagnosis of pneumonia either by PCP or at a hospital?:  No

## 2019-05-01 RX ORDER — OMEPRAZOLE 20 MG/1
CAPSULE, DELAYED RELEASE ORAL
Qty: 30 CAPSULE | Refills: 3 | Status: SHIPPED | OUTPATIENT
Start: 2019-05-01 | End: 2019-10-15 | Stop reason: SDUPTHER

## 2019-05-02 ENCOUNTER — CARE COORDINATION (OUTPATIENT)
Dept: CARE COORDINATION | Age: 54
End: 2019-05-02

## 2019-05-02 ASSESSMENT — ENCOUNTER SYMPTOMS: DYSPNEA ASSOCIATED WITH: EXERTION

## 2019-05-02 NOTE — CARE COORDINATION
Ambulatory Care Coordination Note  5/2/2019  CM Risk Score: 5  Maria M Mortality Risk Score:      ACC: Denilson Snowden, RN    Summary Note: Spoke with patient. States she is feeling better. Breathing is baseline. Finished abx and prednisone. Meds reviewed. Taking zyban but still smoking. Hasn't cut down. Discussed benefits of smoking cessation. Encouraged to cut down and set a quit date. Mental health improving. Daughter and grand kids are moving back. States they have never been apart. Got a job and excited that this will help take her mind off problems. Overall positive outlook. Plan: continue with meds. Report any changes in breathing. Cut down on smoking and set a quit date. Report any worsening of depression. Call Haley Chambers back as she has attempted to reach out. Call with any issues. Care Coordination Interventions    Program Enrollment:  Rising Risk  Referral from Primary Care Provider:  Yes  Suggested Interventions and [address]:  Declined  Social Work:  Completed  Other Services:  Completed (Comment: Spiritual counseling)         Goals Addressed                 This Visit's Progress     Behavioral Health   Improving     I will work towards the following [address] [place] [address] goals: I will schedule a new appointment to establish care with a psychologist/counselor and/or psychiatrist., I will continue to follow up with my psychologist /counselor and/or psychiatrist., I will schedule my follow up appointment with my primary care physician when I leave the office today., I will take my medications daily as prescribed. , I will seek treatment for alcohol/substance use., I will increase my cardiovascular exercise to 30 minutes a day, 3-5 days per week., I will work on improving sleep habits to have consistent sleep/awake time., I will decrease caffeine intake. and I will take a multivitamin daily.     Barriers: impairment:  mental health: depression, fear of failure, financial and stress  Plan for overcoming my barriers: ACC support, SW referral, Spiritual counseling  Confidence: 7/10  Anticipated Goal Completion Date: 7/18/19       Conditions and Symptoms   Improving     I will schedule office visits, as directed by my provider. I will keep my appointment or reschedule if I have to cancel. I will notify my provider of any barriers to my plan of care. I will follow my Zone Management tool to seek urgent or emergent care. I will notify my provider of any symptoms that indicate a worsening of my condition. Barriers: impairment:  mental health: depression, fear of failure, financial and stress  Plan for overcoming my barriers: ACC support, SW referral, smoking cessation  Confidence: 7/10  Anticipated Goal Completion Date: 7/19/19              Prior to Admission medications    Medication Sig Start Date End Date Taking? Authorizing Provider   omeprazole (PRILOSEC) 20 MG delayed release capsule take 1 capsule by mouth once daily 5/1/19  Yes Mariama Gonzalez MD   buPROPion (ZYBAN) 150 MG extended release tablet Take 1 tablet by mouth 2 times daily Take 1/2 tab daily for 3 days, then increase to 1 tab twice daily.  4/18/19  Yes Mariama Gonzalez MD   Multiple Vitamins-Minerals (RA ONE DAILY MAXIMUM) TABS take 1 tablet by mouth once daily 4/8/19  Yes Mariama Gonzalez MD   RA MELATONIN 5 MG TABS tablet take 1 tablet by mouth once daily 3/11/19  Yes Mariama Gonzalez MD   venlafaxine (EFFEXOR XR) 37.5 MG extended release capsule take 3 capsules by mouth once daily 3/11/19  Yes Mariama Gonzalez MD   venlafaxine (EFFEXOR XR) 150 MG extended release capsule take 1 capsule by mouth once daily 3/11/19  Yes Mariama Gonzalez MD   naproxen (EC NAPROSYN) 500 MG EC tablet Take 1 tablet by mouth 2 times daily (with meals) 1/15/19  Yes Dillard Dubin Case, APRN - CNP   RA ASPIRIN ADULT LOW STRENGTH 81 MG chewable tablet chew and swallow 1 tablet by mouth once daily 12/10/18 Yes Tatyana Marie MD   busPIRone (BUSPAR) 7.5 MG tablet Take 1 tablet by mouth 2 times daily 9/28/18  Yes Tatyana Marie MD   SYMBICORT 160-4.5 MCG/ACT AERO inhale 2 puffs by mouth twice a day 2/7/18  Yes Tatyana Marie MD   albuterol sulfate HFA (VENTOLIN HFA) 108 (90 Base) MCG/ACT inhaler Inhale 2 puffs into the lungs every 4 hours as needed for Wheezing 7/18/17  Yes Tatyana Marie MD   benzonatate (TESSALON) 100 MG capsule Take 1 capsule by mouth 3 times daily as needed for Cough 4/8/19   Kira Zaldivar MD       No future appointments.    and   COPD Assessment    Does the patient understand envrionmental exposure?:  Yes  Is the patient able to verbalize Rescue vs. Long Acting medications?:  Yes  Does the patient have a nebulizer?:  No     No patient-reported symptoms         Symptoms:   None:  Yes      Symptom course:  improving  Breathlessness:  exertion  Increase use of rapid acting/rescue inhaled medications?:  No  Change in chronic cough?:  No/At Baseline  Change in sputum?:  No/At Baseline  Self Monitoring - SaO2:  No  Have you had a recent diagnosis of pneumonia either by PCP or at a hospital?:  No

## 2019-05-16 ENCOUNTER — CARE COORDINATION (OUTPATIENT)
Dept: CARE COORDINATION | Age: 54
End: 2019-05-16

## 2019-05-16 NOTE — CARE COORDINATION
Ambulatory Care Coordination Note  5/16/2019  CM Risk Score: 5  Maria M Mortality Risk Score:      ACC: Sorin Pennington RN    Summary Note: Spoke with [address] Reports feeling well. Mood better. Daughter and grand kids moved back. She is also working now. Hasn't needed albuterol. Breathing is above baseline. Still smoking. Discussed benefits of quitting. Encouraged to set a quit date or cut down each day. Scheduled follow up with PCP. Plan:  Early symptom recognition and reporting for COPD. Set a quit date or cut down cigarettes per day each day. Keep PCP appointment. Report any changes in mood or depression. Care Coordination Interventions    Program Enrollment:  Rising Risk  Referral from Primary Care Provider:  Yes  Suggested Interventions and [address]:  Declined  Social Work:  Completed  Other Services:  Completed (Comment: Spiritual counseling)         Goals Addressed                 This Visit's Progress     Behavioral Health   No change     I will work towards the following Behavioral Health goals: I will schedule a new appointment to establish care with a psychologist/counselor and/or psychiatrist., I will continue to follow up with my psychologist /counselor and/or psychiatrist., I will schedule my follow up appointment with my primary care physician when I leave the office today., I will take my medications daily as prescribed. , I will seek treatment for alcohol/substance use., I will increase my cardiovascular exercise to 30 minutes a day, 3-5 days per week., I will work on improving sleep habits to have consistent sleep/awake time., I will decrease caffeine intake. and I will take a multivitamin daily.     Barriers: impairment:  mental health: depression, fear of failure, financial and stress  Plan for overcoming my barriers: ACC support,  referral, Spiritual counseling  Confidence: 7/10  Anticipated Goal Completion Date: 7/18/19       Conditions and Symptoms   Improving     I will schedule office visits, as directed by my provider. I will keep my appointment or reschedule if I have to cancel. I will notify my provider of any barriers to my plan of care. I will follow my Zone Management tool to seek urgent or emergent care. I will notify my provider of any symptoms that indicate a worsening of my condition. Barriers: impairment:  mental health: depression, fear of failure, financial and stress  Plan for overcoming my barriers: ACC support, SW referral, smoking cessation  Confidence: 7/10  Anticipated Goal Completion Date: 7/19/19              Prior to Admission medications    Medication Sig Start Date End Date Taking? Authorizing Provider   omeprazole (PRILOSEC) 20 MG delayed release capsule take 1 capsule by mouth once daily 5/1/19  Yes Collin Hahn MD   buPROPion (ZYBAN) 150 MG extended release tablet Take 1 tablet by mouth 2 times daily Take 1/2 tab daily for 3 days, then increase to 1 tab twice daily.  4/18/19  Yes Collin Hahn MD   benzonatate (TESSALON) 100 MG capsule Take 1 capsule by mouth 3 times daily as needed for Cough 4/8/19  Yes Alexei Lindo MD   Multiple Vitamins-Minerals (RA ONE DAILY MAXIMUM) TABS take 1 tablet by mouth once daily 4/8/19  Yes Collin Hahn MD   RA MELATONIN 5 MG TABS tablet take 1 tablet by mouth once daily 3/11/19  Yes Collin Hahn MD   venlafaxine (EFFEXOR XR) 37.5 MG extended release capsule take 3 capsules by mouth once daily 3/11/19  Yes Collin Hahn MD   venlafaxine (EFFEXOR XR) 150 MG extended release capsule take 1 capsule by mouth once daily 3/11/19  Yes Collin Hahn MD   naproxen (EC NAPROSYN) 500 MG EC tablet Take 1 tablet by mouth 2 times daily (with meals) 1/15/19  Yes Ly Romero, APRN - CNP   RA ASPIRIN ADULT LOW STRENGTH 81 MG chewable tablet chew and swallow 1 tablet by mouth once daily 12/10/18  Yes Collin Hahn MD   busPIRone (BUSPAR) 7.5 MG tablet Take 1 tablet by mouth 2 times daily 9/28/18  Yes Elda Enciso MD   SYMBICORT 160-4.5 MCG/ACT AERO inhale 2 puffs by mouth twice a day 2/7/18  Yes Elda Enciso MD   albuterol sulfate HFA (VENTOLIN HFA) 108 (90 Base) MCG/ACT inhaler Inhale 2 puffs into the lungs every 4 hours as needed for Wheezing 7/18/17  Yes Elda Enciso MD       Future Appointments   Date Time Provider Alley Olivares   5/28/2019  2:15 PM Elda Enciso MD [facility] [id number] [facility]      and   COPD Assessment    Does the patient understand envrionmental exposure?:  Yes  Is the patient able to verbalize Rescue vs. Long Acting medications?:  Yes  Does the patient have a nebulizer?:  No     No patient-reported symptoms         Symptoms:   None:  Yes      Symptom course:  stable  Breathlessness:  none  Increase use of rapid acting/rescue inhaled medications?:  No  Change in chronic cough?:  No/At Baseline  Change in sputum?:  No/At Baseline  Self Monitoring - SaO2:  No  Have you had a recent diagnosis of pneumonia either by PCP or at a hospital?:  No

## 2019-05-16 NOTE — CARE COORDINATION
Spoke with pt on phone. Introduced self and role. I have called and left several msg but pt never returns my calls. Discussed concerns with pt. Pt reported, \"doing better now that my daughter moved back and I got a job at [employer]. 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 Inquired if pt is still drinking and she stated she has \"cut way back compared to what I was. \" she stated she has decreased her smoking too. Encouraged pt to continue with progress. Provided active listening and encouragement. Pt voiced understanding and appreciative of call.

## 2019-05-29 ENCOUNTER — CARE COORDINATION (OUTPATIENT)
Dept: CARE COORDINATION | Age: 54
End: 2019-05-29

## 2019-05-29 NOTE — CARE COORDINATION
I called pt and she returned my call 2 minutes later in tears. We talked for well over 30 minutes. She stated, \"I just need someone to talk to. \"  She shared that her mom passed away 10 yrs ago and she still misses her. Pt reported that she got a \"DUI after her mom  and she is still paying off her fine $50 a month. She stated she didn't pay last month so they put a block on her license which she didn't know till  when she was pulled over for no turn signal and the officer informed her that her license was suspended. Now she has to go to court this coming Monday. Pt stated she is drinking, \"not everyday but I had 5 tall boys yesterday arleen. \" Pt also stated that she is \"not sexually active and I have no desire. \"  She asked if there is \"womans Viagra. \" I encouraged her to talk to her Dr. Mani Peterson stated she had to cancel her appt on  with Dr. Irene Tenorio because pt had to work. I encouraged pt to reschedule as soon as she knows her work schedule. Pt also shared family dysfunction and drama with daughter and son-in-law. Encouraged pt to make a gratitude journal and writer down 5-10 things a day that she is thankful for. Encouraged her to \"cut way back on drinking\"  And she may feel better emotionally. Pt voiced understanding. Pt states she is not sleeping well. Provided active listening and encouragement.

## 2019-06-10 ENCOUNTER — CARE COORDINATION (OUTPATIENT)
Dept: CARE COORDINATION | Age: 54
End: 2019-06-10

## 2019-06-10 ASSESSMENT — ENCOUNTER SYMPTOMS: DYSPNEA ASSOCIATED WITH: EXERTION

## 2019-06-10 NOTE — CARE COORDINATION
Ambulatory Care Coordination Note  6/10/2019  CM Risk Score: 5  Maria M Mortality Risk Score:      ACC: Mike Hester RN    Summary Note: Spoke with Marina Olguin. Reports breathing is baseline. Using inhaler about 3-4 times daily due to humidity. Reviewed zone management and symptom monitoring. Mood good. Working and taking care of self and home. Still smoking. Plan: early symptom recognition and reporting for COPD. Use same day appointments instead of ED. Call with any issues. Set a quit date. Care Coordination Interventions    Program Enrollment:  Rising Risk  Referral from Primary Care Provider:  Yes  Suggested Interventions and [address]:  Declined  Social Work:  Completed  Other Services:  Completed (Comment: Spiritual counseling)         Goals Addressed                 This Visit's Progress     Behavioral Health   No change     I will work towards the following Behavioral Health goals: I will schedule a new appointment to establish care with a psychologist/counselor and/or psychiatrist., I will continue to follow up with my psychologist /counselor and/or psychiatrist., I will schedule my follow up appointment with my primary care physician when I leave the office today., I will take my medications daily as prescribed. , I will seek treatment for alcohol/substance use., I will increase my cardiovascular exercise to 30 minutes a day, 3-5 days per week., I will work on improving sleep habits to have consistent sleep/awake time., I will decrease caffeine intake. and I will take a multivitamin daily. Barriers: impairment:  mental health: depression, fear of failure, financial and stress  Plan for overcoming my barriers: ACC support,  referral, Spiritual counseling  Confidence: 7/10  Anticipated Goal Completion Date: 7/18/19       Conditions and Symptoms   Improving     I will schedule office visits, as directed by my provider.   I will keep my appointment or reschedule if I have to cancel. I will notify my provider of any barriers to my plan of care. I will follow my Zone Management tool to seek urgent or emergent care. I will notify my provider of any symptoms that indicate a worsening of my condition. Barriers: impairment:  mental health: depression, fear of failure, financial and stress  Plan for overcoming my barriers: ACC support, SW referral, smoking cessation  Confidence: 7/10  Anticipated Goal Completion Date: 7/19/19              Prior to Admission medications    Medication Sig Start Date End Date Taking? Authorizing Provider   omeprazole (PRILOSEC) 20 MG delayed release capsule take 1 capsule by mouth once daily 5/1/19  Yes Christopher Santana MD   buPROPion (ZYBAN) 150 MG extended release tablet Take 1 tablet by mouth 2 times daily Take 1/2 tab daily for 3 days, then increase to 1 tab twice daily.  4/18/19  Yes Christopher Santana MD   benzonatate (TESSALON) 100 MG capsule Take 1 capsule by mouth 3 times daily as needed for Cough 4/8/19  Yes Ren Cunningham MD   Multiple Vitamins-Minerals (RA ONE DAILY MAXIMUM) TABS take 1 tablet by mouth once daily 4/8/19  Yes Christopher Santana MD   RA MELATONIN 5 MG TABS tablet take 1 tablet by mouth once daily 3/11/19  Yes Christopher Santana MD   venlafaxine (EFFEXOR XR) 37.5 MG extended release capsule take 3 capsules by mouth once daily 3/11/19  Yes Christopher Santana MD   venlafaxine (EFFEXOR XR) 150 MG extended release capsule take 1 capsule by mouth once daily 3/11/19  Yes Christopher Santana MD   naproxen (EC NAPROSYN) 500 MG EC tablet Take 1 tablet by mouth 2 times daily (with meals) 1/15/19  Yes Zachary Aguillonr Nick, APRN - CNP   RA ASPIRIN ADULT LOW STRENGTH 81 MG chewable tablet chew and swallow 1 tablet by mouth once daily 12/10/18  Yes Christopher Santana MD   busPIRone (BUSPAR) 7.5 MG tablet Take 1 tablet by mouth 2 times daily 9/28/18  Yes Christopher Santana MD   SYMBICORT 160-4.5 MCG/ACT AERO inhale 2 puffs by mouth twice a day 2/7/18  Yes Merlin Thakkar MD   albuterol sulfate HFA (VENTOLIN HFA) 108 (90 Base) MCG/ACT inhaler Inhale 2 puffs into the lungs every 4 hours as needed for Wheezing 7/18/17  Yes Merlin Thakkar MD       No future appointments.    and   COPD Assessment    Does the patient understand envrionmental exposure?:  Yes  Is the patient able to verbalize Rescue vs. Long Acting medications?:  Yes  Does the patient have a nebulizer?:  No     No patient-reported symptoms         Symptoms:   None:  Yes      Symptom course:  stable  Breathlessness:  exertion  Increase use of rapid acting/rescue inhaled medications?:  No  Change in chronic cough?:  No/At Baseline  Change in sputum?:  No/At Baseline  Self Monitoring - SaO2:  No  Have you had a recent diagnosis of pneumonia either by PCP or at a hospital?:  No

## 2019-06-18 ENCOUNTER — OFFICE VISIT (OUTPATIENT)
Dept: FAMILY MEDICINE CLINIC | Age: 54
End: 2019-06-18
Payer: MEDICARE

## 2019-06-18 VITALS
HEART RATE: 66 BPM | DIASTOLIC BLOOD PRESSURE: 70 MMHG | SYSTOLIC BLOOD PRESSURE: 104 MMHG | HEIGHT: 64 IN | BODY MASS INDEX: 17.42 KG/M2 | WEIGHT: 102 LBS

## 2019-06-18 DIAGNOSIS — F10.10 ALCOHOL ABUSE: ICD-10-CM

## 2019-06-18 DIAGNOSIS — R63.4 WEIGHT LOSS: Primary | ICD-10-CM

## 2019-06-18 DIAGNOSIS — R63.6 UNDERWEIGHT: ICD-10-CM

## 2019-06-18 PROCEDURE — 3017F COLORECTAL CA SCREEN DOC REV: CPT | Performed by: FAMILY MEDICINE

## 2019-06-18 PROCEDURE — G8419 CALC BMI OUT NRM PARAM NOF/U: HCPCS | Performed by: FAMILY MEDICINE

## 2019-06-18 PROCEDURE — 99213 OFFICE O/P EST LOW 20 MIN: CPT | Performed by: FAMILY MEDICINE

## 2019-06-18 PROCEDURE — 4004F PT TOBACCO SCREEN RCVD TLK: CPT | Performed by: FAMILY MEDICINE

## 2019-06-18 PROCEDURE — G8427 DOCREV CUR MEDS BY ELIG CLIN: HCPCS | Performed by: FAMILY MEDICINE

## 2019-06-18 RX ORDER — MEGESTROL ACETATE 125 MG/ML
625 SUSPENSION ORAL DAILY
Qty: 150 ML | Refills: 3 | Status: SHIPPED | OUTPATIENT
Start: 2019-06-18 | End: 2019-10-09 | Stop reason: ALTCHOICE

## 2019-06-18 ASSESSMENT — ENCOUNTER SYMPTOMS
NAUSEA: 0
DIARRHEA: 0
VOMITING: 0
CONSTIPATION: 0

## 2019-06-18 NOTE — PROGRESS NOTES
[address] [place] [address] Rd, [address], [place]  Phone:  [phone]  MOT:[phone]       Name: Wally Schuler  : 1965    Chief Complaint   Patient presents with    Weight Loss     drinking 4-16 oz beer daily    or   3-24 oz beers daily       HPI:     HPI  Wally Schuler is a 47 y.o. female who presents today for evaluation of weight loss and to discuss alcohol consumption. She has struggled with her (low) weight for years, but now can't seem to put any on. She does not have an appetite, and doesn't have any teeth so can't eat much. She does acknowledge that she's mostly drinking her calories. She's drinking at least 4 16-oz beers per day. Her  Sapna Diallo is a recovering alcoholic. She states that she's not like him and can stop if she wants and doesn't get drunk when she drinks. She drinks as a stress relief but is planning on spending more time with her grandchildren and drinking less. She is now working at [employer] as a  and likes it. Current Outpatient Medications:     megestrol (MEGACE ES) 625 MG/5ML suspension, Take 5 mLs by mouth daily, Disp: 150 mL, Rfl: 3    omeprazole (PRILOSEC) 20 MG delayed release capsule, take 1 capsule by mouth once daily, Disp: 30 capsule, Rfl: 3    buPROPion (ZYBAN) 150 MG extended release tablet, Take 1 tablet by mouth 2 times daily Take 1/2 tab daily for 3 days, then increase to 1 tab twice daily. , Disp: 60 tablet, Rfl: 1    Multiple Vitamins-Minerals (RA ONE DAILY MAXIMUM) TABS, take 1 tablet by mouth once daily, Disp: 30 tablet, Rfl: 3    RA MELATONIN 5 MG TABS tablet, take 1 tablet by mouth once daily, Disp: 30 tablet, Rfl: 2    venlafaxine (EFFEXOR XR) 37.5 MG extended release capsule, take 3 capsules by mouth once daily, Disp: 90 capsule, Rfl: 2    venlafaxine (EFFEXOR XR) 150 MG extended release capsule, take 1 capsule by mouth once daily, Disp: 30 capsule, Rfl: 2    naproxen (EC NAPROSYN) 500 MG EC tablet, Take 1 tablet by mouth 2 times daily (with meals), Disp: 30 tablet, Rfl: 0    RA ASPIRIN ADULT LOW STRENGTH 81 MG chewable tablet, chew and swallow 1 tablet by mouth once daily, Disp: 365 tablet, Rfl: 0    busPIRone (BUSPAR) 7.5 MG tablet, Take 1 tablet by mouth 2 times daily, Disp: 60 tablet, Rfl: 2    SYMBICORT 160-4.5 MCG/ACT AERO, inhale 2 puffs by mouth twice a day, Disp: 10.2 g, Rfl: 5    albuterol sulfate HFA (VENTOLIN HFA) 108 (90 Base) MCG/ACT inhaler, Inhale 2 puffs into the lungs every 4 hours as needed for Wheezing, Disp: 1 Inhaler, Rfl: 5    No Known Allergies    Subjective:      Review of Systems   Constitutional: Positive for fatigue. Negative for activity change and appetite change. Gastrointestinal: Negative for constipation, diarrhea, nausea and vomiting. Psychiatric/Behavioral: The patient is nervous/anxious. Objective:     /70 (Site: Left Upper Arm, Position: Sitting, Cuff Size: Large Adult)   Pulse 66   Ht 5' 4\" (1.626 m)   Wt 102 lb (46.3 kg)   BMI 17.51 kg/m²     Physical Exam   Constitutional: She is oriented to person, place, and time. She appears well-developed. Underweight. HENT:   Head: Normocephalic and atraumatic. Nose: Nose normal.   Eyes: Conjunctivae and EOM are normal.   Neck: Normal range of motion. Neck supple. Cardiovascular: Normal rate and regular rhythm. Pulmonary/Chest: Effort normal and breath sounds normal. No respiratory distress. She has no wheezes. Abdominal: Soft. Bowel sounds are normal.   Neurological: She is alert and oriented to person, place, and time. Skin: Skin is warm and dry. Nursing note and vitals reviewed. Assessment/Plan:     Brendan Gutierrez was seen today for weight loss. Diagnoses and all orders for this visit:    Weight loss/Underweight  -     Will try Megace to help put weight on. Advised use of Ensure shakes 2-3x/day. -     megestrol (MEGACE ES) 625 MG/5ML suspension;  Take 5 mLs by mouth daily    Alcohol

## 2019-06-18 NOTE — PATIENT INSTRUCTIONS
Patient Education        Alcohol and Drug Problems: Care Instructions  Your Care Instructions    You can improve your life and health by stopping your use of alcohol or drugs. Ending dependency on alcohol or drugs may help you feel and sleep better. You may get along better with your family, friends, and coworkers. There are medicines and programs that can help. Follow-up care is a key part of your treatment and safety. Be sure to make and go to all appointments, and call your doctor if you are having problems. It's also a good idea to know your test results and keep a list of the medicines you take. How can you care for yourself at home? · If you have been given medicine to help keep you sober or reduce your cravings, be sure to take it as prescribed. · Talk to your doctor about programs that can help you stop using drugs or drinking alcohol. · If your doctor prescribes disulfiram (Antabuse), do not drink any alcohol while you are taking this medicine. You may have severe, even life-threatening, side effects from even small amounts of alcohol. · Do not tempt yourself by keeping alcohol or drugs in your home. · Learn how to say no when other people drink or use drugs. Or don't spend time with people who drink or use drugs. · Use the time and money spent on drinking or drugs to do something fun with your family or friends. Preventing a relapse  · Do not drink alcohol or use drugs at all. Using any amount of alcohol or drugs greatly increases your risk for relapse. · Seek help from organizations such as Alcoholics Anonymous, Narcotics Anonymous, or treatment facilities if you feel the need to drink alcohol or use drugs again. · Remember that recovery is a lifelong process. · Stay away from situations, friends, or places that may lead you to drink or use drugs. · Have a plan to spot and deal with relapse. Learn to recognize changes in your thinking that lead you to drink or use drugs.  These are warning signs. Get help before you start to drink or use drugs again. · Get help as soon as you can if you relapse. Some people make a plan with another person that outlines what they want that person to do for them if they relapse. The plan usually includes how to handle the relapse and who to notify in case of relapse. · Don't give up. Remember that a relapse does not mean that you have failed. Use the experience to learn the triggers that lead you to drink or use drugs. Then quit again. Many people have several relapses before they are able to quit for good. When should you call for help? Call 911 anytime you think you may need emergency care. For example, call if:    · You feel you cannot stop from hurting yourself or someone else.   [facility] your doctor now or seek immediate medical care if:    · You have serious withdrawal symptoms such as confusion, hallucinations, or severe trembling.    Watch closely for changes in your health, and be sure to contact your doctor if:    · You have a relapse.     · You need more help or support to stop. Where can you learn more? Go to [email or web address]://[email or web address]. org and sign in to your [employer] account. Enter [id number] in the [facility] box to learn more about \"Alcohol and Drug Problems: Care Instructions. \"     If you do not have an account, please click on the \"Sign Up Now\" link. Current as of: May 7, 2018  Content Version: 12.0  © 6917-5327 [employer], [employer]. Care instructions adapted under license by [facility] ([facility]). If you have questions about a medical condition or this instruction, always ask your healthcare professional. [address] any warranty or liability for your use of this information.

## 2019-06-19 ENCOUNTER — TELEPHONE (OUTPATIENT)
Dept: FAMILY MEDICINE CLINIC | Age: 54
End: 2019-06-19

## 2019-06-19 NOTE — TELEPHONE ENCOUNTER
I'm not sure what the out of pocket price would be. .. Maybe they could afford it? I gave an Rx for Ensure shakes 3x/day.

## 2019-06-21 ENCOUNTER — CARE COORDINATION (OUTPATIENT)
Dept: CARE COORDINATION | Age: 54
End: 2019-06-21

## 2019-06-24 NOTE — CARE COORDINATION
Megestrol tablets are no charge for patient. Note routed to PCP asking to change to tablets. PCP ordered tablets. Attempted to update patient and phone recording states all circuits are busy. Attempted from office phone and rang busy. Will try again later.

## 2019-06-24 NOTE — TELEPHONE ENCOUNTER
I tried to send in Megestrol 20 mg tabs (take 1 tab daily x 30 days with 1 refill) and it wouldn't let me because \"a physician's location, phone number, etc. Was needed?). Can you please call it in for her?

## 2019-06-25 RX ORDER — MEGESTROL ACETATE 20 MG/1
20 TABLET ORAL DAILY
Qty: 30 TABLET | Refills: 1 | Status: SHIPPED | OUTPATIENT
Start: 2019-06-25 | End: 2019-10-11 | Stop reason: SDUPTHER

## 2019-06-29 DIAGNOSIS — F51.04 PSYCHOPHYSIOLOGICAL INSOMNIA: ICD-10-CM

## 2019-06-29 DIAGNOSIS — F33.2 SEVERE EPISODE OF RECURRENT MAJOR DEPRESSIVE DISORDER, WITHOUT PSYCHOTIC FEATURES (HCC): ICD-10-CM

## 2019-07-01 RX ORDER — VENLAFAXINE HYDROCHLORIDE 150 MG/1
CAPSULE, EXTENDED RELEASE ORAL
Qty: 90 CAPSULE | Refills: 1 | Status: SHIPPED | OUTPATIENT
Start: 2019-07-01 | End: 2020-03-31 | Stop reason: SDUPTHER

## 2019-07-01 RX ORDER — PSYLLIUM HUSK 3.4 G/7G
POWDER ORAL
Qty: 90 TABLET | Refills: 1 | Status: SHIPPED | OUTPATIENT
Start: 2019-07-01 | End: 2020-02-24 | Stop reason: SDUPTHER

## 2019-07-01 RX ORDER — VENLAFAXINE HYDROCHLORIDE 37.5 MG/1
CAPSULE, EXTENDED RELEASE ORAL
Qty: 90 CAPSULE | Refills: 1 | Status: SHIPPED | OUTPATIENT
Start: 2019-07-01 | End: 2019-10-09 | Stop reason: ALTCHOICE

## 2019-07-15 ENCOUNTER — CARE COORDINATION (OUTPATIENT)
Dept: CARE COORDINATION | Age: 54
End: 2019-07-15

## 2019-07-15 NOTE — CARE COORDINATION
improving sleep habits to have consistent sleep/awake time., I will decrease caffeine intake. and I will take a multivitamin daily. Barriers: impairment:  mental health: depression, fear of failure, financial and stress  Plan for overcoming my barriers: ACC support, SW referral, Spiritual counseling  Confidence: 7/10  Anticipated Goal Completion Date: 7/18/19       Conditions and Symptoms   Improving     I will schedule office visits, as directed by my provider. I will keep my appointment or reschedule if I have to cancel. I will notify my provider of any barriers to my plan of care. I will follow my Zone Management tool to seek urgent or emergent care. I will notify my provider of any symptoms that indicate a worsening of my condition. Barriers: impairment:  mental health: depression, fear of failure, financial and stress  Plan for overcoming my barriers: ACC support, SW referral, smoking cessation  Confidence: 7/10  Anticipated Goal Completion Date: 7/19/19              Prior to Admission medications    Medication Sig Start Date End Date Taking? Authorizing Provider   venlafaxine (EFFEXOR XR) 150 MG extended release capsule take 1 capsule by mouth once daily 7/1/19  Yes Brant Luis MD RA MELATONIN 5 MG TABS tablet take 1 tablet by mouth once daily 7/1/19  Yes Brant Luis MD   venlafaxine (EFFEXOR XR) 37.5 MG extended release capsule take 3 capsules by mouth once daily 7/1/19  Yes Brant Luis MD   megestrol (MEGACE) 20 MG tablet Take 1 tablet by mouth daily 6/25/19  Yes Brant Luis MD   omeprazole (PRILOSEC) 20 MG delayed release capsule take 1 capsule by mouth once daily 5/1/19  Yes Brant Luis MD   buPROPion (ZYBAN) 150 MG extended release tablet Take 1 tablet by mouth 2 times daily Take 1/2 tab daily for 3 days, then increase to 1 tab twice daily.  4/18/19  Yes Brant Luis MD RA ASPIRIN ADULT LOW STRENGTH 81 MG chewable tablet chew and swallow 1

## 2019-07-25 ENCOUNTER — CARE COORDINATION (OUTPATIENT)
Dept: CARE COORDINATION | Age: 54
End: 2019-07-25

## 2019-07-30 ENCOUNTER — CARE COORDINATION (OUTPATIENT)
Dept: CARE COORDINATION | Age: 54
End: 2019-07-30

## 2019-07-30 NOTE — CARE COORDINATION
I met pt at the [employer] in [place] where she is currently working. She stated, she really enjoys her job and \"they are good to her. \" She reported she has still not gained any weight, trying to drink ensure. Encouraged her to grab a couple milkshakes after work. Provided pt with food bank information and CO[employer] directory along with my card and encouraged her to call with any needs or concerns. Pt voiced understanding. Active listening and encouragement provided. MI used to engage pt and build rapport.

## 2019-08-05 ENCOUNTER — OFFICE VISIT (OUTPATIENT)
Dept: FAMILY MEDICINE CLINIC | Age: 54
End: 2019-08-05
Payer: MEDICARE

## 2019-08-05 VITALS
DIASTOLIC BLOOD PRESSURE: 82 MMHG | BODY MASS INDEX: 17.34 KG/M2 | HEART RATE: 82 BPM | WEIGHT: 101 LBS | SYSTOLIC BLOOD PRESSURE: 116 MMHG

## 2019-08-05 DIAGNOSIS — H69.82 DYSFUNCTION OF LEFT EUSTACHIAN TUBE: Primary | ICD-10-CM

## 2019-08-05 DIAGNOSIS — R63.6 UNDERWEIGHT: ICD-10-CM

## 2019-08-05 PROCEDURE — 4004F PT TOBACCO SCREEN RCVD TLK: CPT | Performed by: FAMILY MEDICINE

## 2019-08-05 PROCEDURE — 3017F COLORECTAL CA SCREEN DOC REV: CPT | Performed by: FAMILY MEDICINE

## 2019-08-05 PROCEDURE — G8419 CALC BMI OUT NRM PARAM NOF/U: HCPCS | Performed by: FAMILY MEDICINE

## 2019-08-05 PROCEDURE — G8427 DOCREV CUR MEDS BY ELIG CLIN: HCPCS | Performed by: FAMILY MEDICINE

## 2019-08-05 PROCEDURE — 99213 OFFICE O/P EST LOW 20 MIN: CPT | Performed by: FAMILY MEDICINE

## 2019-08-05 RX ORDER — FLUTICASONE PROPIONATE 50 MCG
2 SPRAY, SUSPENSION (ML) NASAL DAILY
Qty: 1 BOTTLE | Refills: 0 | Status: SHIPPED | OUTPATIENT
Start: 2019-08-05 | End: 2019-11-19 | Stop reason: SDUPTHER

## 2019-08-05 RX ORDER — GASTROSTOMY TUBE 18 FR
KIT MISCELLANEOUS
Qty: 30 CAN | Refills: 5 | Status: SHIPPED | OUTPATIENT
Start: 2019-08-05 | End: 2019-10-09 | Stop reason: ALTCHOICE

## 2019-08-05 ASSESSMENT — ENCOUNTER SYMPTOMS
COUGH: 1
CHEST TIGHTNESS: 0

## 2019-08-05 NOTE — PROGRESS NOTES
Disp: 30 tablet, Rfl: 3    naproxen (EC NAPROSYN) 500 MG EC tablet, Take 1 tablet by mouth 2 times daily (with meals), Disp: 30 tablet, Rfl: 0    RA ASPIRIN ADULT LOW STRENGTH 81 MG chewable tablet, chew and swallow 1 tablet by mouth once daily, Disp: 365 tablet, Rfl: 0    busPIRone (BUSPAR) 7.5 MG tablet, Take 1 tablet by mouth 2 times daily, Disp: 60 tablet, Rfl: 2    SYMBICORT 160-4.5 MCG/ACT AERO, inhale 2 puffs by mouth twice a day, Disp: 10.2 g, Rfl: 5    albuterol sulfate HFA (VENTOLIN HFA) 108 (90 Base) MCG/ACT inhaler, Inhale 2 puffs into the lungs every 4 hours as needed for Wheezing, Disp: 1 Inhaler, Rfl: 5    No Known Allergies    Subjective:      Review of Systems   Constitutional: Negative for chills and fever. HENT: Positive for congestion and hearing loss. Negative for ear discharge and ear pain. Respiratory: Positive for cough. Negative for chest tightness. Objective:     /82 (Site: Left Upper Arm, Position: Sitting, Cuff Size: Medium Adult)   Pulse 82   Wt 101 lb (45.8 kg)   BMI 17.34 kg/m²     Physical Exam   Constitutional: She is oriented to person, place, and time. She appears well-developed. No distress. HENT:   Head: Normocephalic and atraumatic. Right Ear: Tympanic membrane, external ear and ear canal normal.   Left Ear: Tympanic membrane, external ear and ear canal normal.   Nose: Mucosal edema and rhinorrhea present. Eyes: Conjunctivae and EOM are normal.   Neck: Normal range of motion. Neck supple. Cardiovascular: Normal rate, regular rhythm, normal heart sounds and intact distal pulses. Pulmonary/Chest: Effort normal and breath sounds normal. No respiratory distress. She has no wheezes. Abdominal: Soft. Bowel sounds are normal. She exhibits no distension. There is no tenderness. Neurological: She is alert and oriented to person, place, and time. Skin: Skin is warm and dry. No rash noted. No erythema.    Psychiatric: She has a normal mood and

## 2019-08-06 ENCOUNTER — CARE COORDINATION (OUTPATIENT)
Dept: CARE COORDINATION | Age: 54
End: 2019-08-06

## 2019-08-12 ENCOUNTER — CARE COORDINATION (OUTPATIENT)
Dept: CARE COORDINATION | Age: 54
End: 2019-08-12

## 2019-08-14 ENCOUNTER — CARE COORDINATION (OUTPATIENT)
Dept: CARE COORDINATION | Age: 54
End: 2019-08-14

## 2019-09-05 ENCOUNTER — CARE COORDINATION (OUTPATIENT)
Dept: CARE COORDINATION | Age: 54
End: 2019-09-05

## 2019-09-06 ENCOUNTER — OFFICE VISIT (OUTPATIENT)
Dept: FAMILY MEDICINE CLINIC | Age: 54
End: 2019-09-06
Payer: MEDICARE

## 2019-09-06 VITALS
HEIGHT: 64 IN | BODY MASS INDEX: 16.73 KG/M2 | WEIGHT: 98 LBS | SYSTOLIC BLOOD PRESSURE: 130 MMHG | DIASTOLIC BLOOD PRESSURE: 74 MMHG | HEART RATE: 60 BPM

## 2019-09-06 DIAGNOSIS — E46 MALNUTRITION, CALORIE (HCC): ICD-10-CM

## 2019-09-06 DIAGNOSIS — F41.9 ANXIETY: ICD-10-CM

## 2019-09-06 DIAGNOSIS — R63.6 UNDERWEIGHT: ICD-10-CM

## 2019-09-06 DIAGNOSIS — H66.002 ACUTE SUPPURATIVE OTITIS MEDIA OF LEFT EAR WITHOUT SPONTANEOUS RUPTURE OF TYMPANIC MEMBRANE, RECURRENCE NOT SPECIFIED: Primary | ICD-10-CM

## 2019-09-06 PROCEDURE — 3017F COLORECTAL CA SCREEN DOC REV: CPT | Performed by: FAMILY MEDICINE

## 2019-09-06 PROCEDURE — 4004F PT TOBACCO SCREEN RCVD TLK: CPT | Performed by: FAMILY MEDICINE

## 2019-09-06 PROCEDURE — 99214 OFFICE O/P EST MOD 30 MIN: CPT | Performed by: FAMILY MEDICINE

## 2019-09-06 PROCEDURE — G8419 CALC BMI OUT NRM PARAM NOF/U: HCPCS | Performed by: FAMILY MEDICINE

## 2019-09-06 PROCEDURE — G8427 DOCREV CUR MEDS BY ELIG CLIN: HCPCS | Performed by: FAMILY MEDICINE

## 2019-09-06 RX ORDER — AMOXICILLIN 500 MG/1
500 CAPSULE ORAL 2 TIMES DAILY
Qty: 20 CAPSULE | Refills: 0 | Status: SHIPPED | OUTPATIENT
Start: 2019-09-06 | End: 2019-09-16

## 2019-09-06 RX ORDER — BUSPIRONE HYDROCHLORIDE 7.5 MG/1
7.5 TABLET ORAL 2 TIMES DAILY
Qty: 60 TABLET | Refills: 0 | Status: SHIPPED | OUTPATIENT
Start: 2019-09-06 | End: 2019-10-06

## 2019-09-06 ASSESSMENT — ENCOUNTER SYMPTOMS
COUGH: 1
NAUSEA: 0
VOMITING: 0
EYE REDNESS: 0
SHORTNESS OF BREATH: 0
RHINORRHEA: 0
COLOR CHANGE: 0
SORE THROAT: 0
EYE DISCHARGE: 0

## 2019-09-06 NOTE — PROGRESS NOTES
Exam   Constitutional: She is oriented to person, place, and time. She appears well-developed. No distress. HENT:   Head: Normocephalic and atraumatic. Left Ear: Tympanic membrane is erythematous and bulging. Nose: Nose normal.   Eyes: Conjunctivae and EOM are normal.   Neck: Normal range of motion. Neck supple. Cardiovascular: Normal rate and regular rhythm. Pulmonary/Chest: Effort normal and breath sounds normal. No respiratory distress. She has no wheezes. Abdominal: Soft. Bowel sounds are normal. She exhibits no distension. There is no tenderness. Neurological: She is alert and oriented to person, place, and time. Skin: Skin is warm and dry. No rash noted. No erythema. Psychiatric: Her mood appears anxious. Nursing note and vitals reviewed. Assessment/Plan:     Derek Garner was seen today for ear fullness. Diagnoses and all orders for this visit:    Acute suppurative otitis media of left ear without spontaneous rupture of tympanic membrane, recurrence not specified  -     Symptoms are consistent with left otitis media so an Rx for Amoxicillin was sent in.  -     amoxicillin (AMOXIL) 500 MG capsule; Take 1 capsule by mouth 2 times daily for 10 days    Underweight/Malnutrition, calorie ([address]  -     Patient cannot afford enough food at home. She was provided with Ensure Protein shakes from our office. Will refer to social work to help with resources and possibly get the patient set up with the food bank. -     [address], [place], [address], [facility], [facility].[place]    Anxiety  -     She is struggling with anxiety so will add Buspar to current medications. She was advised to contact our office with worsening mood or medication side effects.  -     [place] [address], [address]  -     busPIRone (BUSPAR) 7.5 MG tablet; Take 1 tablet by mouth 2 times daily      Return in about 1 month (around 10/6/2019) for weight check.     Electronically signed by Mariam Julio

## 2019-09-06 NOTE — LETTER
[id number] [address] MD Makayla Alcazar MD Geoffrey Abrahams Hageman, MD  [address], [address]  Phone: [phone]  Fax: [phone] [address] [facility]  [address] [place] [address]  Phone: [phone]  Fax: [phone]    Stephania Card MD        September 6, 2019     Patient: Karli Stevens   YOB: 1965   Date of Visit: 9/6/2019       To Whom It May Concern: It is my medical opinion that Kailey Patel may return to work on 09/07/2019. If you have any questions or concerns, please don't hesitate to call.     Sincerely,        Stephania Card MD

## 2019-09-09 ENCOUNTER — TELEPHONE (OUTPATIENT)
Dept: FAMILY MEDICINE CLINIC | Age: 54
End: 2019-09-09

## 2019-09-09 NOTE — TELEPHONE ENCOUNTER
Patient called wondering if you would be willing to start her on a medication to \"slow her down\" states that feels like has too much energy.

## 2019-09-13 ENCOUNTER — CARE COORDINATION (OUTPATIENT)
Dept: CARE COORDINATION | Age: 54
End: 2019-09-13

## 2019-09-18 ENCOUNTER — TELEPHONE (OUTPATIENT)
Dept: FAMILY MEDICINE CLINIC | Age: 54
End: 2019-09-18

## 2019-09-23 ENCOUNTER — OFFICE VISIT (OUTPATIENT)
Dept: BEHAVIORAL/MENTAL HEALTH CLINIC | Age: 54
End: 2019-09-23
Payer: MEDICARE

## 2019-09-23 DIAGNOSIS — F33.2 SEVERE EPISODE OF RECURRENT MAJOR DEPRESSIVE DISORDER, WITHOUT PSYCHOTIC FEATURES (HCC): ICD-10-CM

## 2019-09-23 DIAGNOSIS — F41.9 ANXIETY: Primary | ICD-10-CM

## 2019-09-23 PROCEDURE — 4004F PT TOBACCO SCREEN RCVD TLK: CPT | Performed by: SOCIAL WORKER

## 2019-09-23 PROCEDURE — 90791 PSYCH DIAGNOSTIC EVALUATION: CPT | Performed by: SOCIAL WORKER

## 2019-09-23 ASSESSMENT — PATIENT HEALTH QUESTIONNAIRE - PHQ9
3. TROUBLE FALLING OR STAYING ASLEEP: 0
4. FEELING TIRED OR HAVING LITTLE ENERGY: 3
1. LITTLE INTEREST OR PLEASURE IN DOING THINGS: 1
6. FEELING BAD ABOUT YOURSELF - OR THAT YOU ARE A FAILURE OR HAVE LET YOURSELF OR YOUR FAMILY DOWN: 3
SUM OF ALL RESPONSES TO PHQ QUESTIONS 1-9: 10
10. IF YOU CHECKED OFF ANY PROBLEMS, HOW DIFFICULT HAVE THESE PROBLEMS MADE IT FOR YOU TO DO YOUR WORK, TAKE CARE OF THINGS AT HOME, OR GET ALONG WITH OTHER PEOPLE: 2
7. TROUBLE CONCENTRATING ON THINGS, SUCH AS READING THE NEWSPAPER OR WATCHING TELEVISION: 0
8. MOVING OR SPEAKING SO SLOWLY THAT OTHER PEOPLE COULD HAVE NOTICED. OR THE OPPOSITE, BEING SO FIGETY OR RESTLESS THAT YOU HAVE BEEN MOVING AROUND A LOT MORE THAN USUAL: 0
2. FEELING DOWN, DEPRESSED OR HOPELESS: 3
SUM OF ALL RESPONSES TO PHQ9 QUESTIONS 1 & 2: 4
5. POOR APPETITE OR OVEREATING: 0
SUM OF ALL RESPONSES TO PHQ QUESTIONS 1-9: 10
9. THOUGHTS THAT YOU WOULD BE BETTER OFF DEAD, OR OF HURTING YOURSELF: 0

## 2019-09-23 NOTE — PATIENT INSTRUCTIONS
exhale.  The next time you exhale, count \"two,\" and so on up to Jesus. \"   Then begin a new cycle, counting \"one\" on the next exhalation. Never count higher than \"five,\" and count only when you exhale. You will know your attention has wandered when you find yourself up to \"eight,\" \"12,\" even \"19. \"  Try to do 10 minutes of this form of meditation. .Depression: Facts, Myths & Tips for Feeling Better    Facts    Depression is very common  Nearly 20% of the [place] population experiences a significant depression during their lifetime. Depression is treatable  Because depression affects so many, and can have such a powerful and negative impact on life, there has been an enormous amount of research conducted on how to reduce symptoms and improve functioning. As a result, we now know there are behaviors YOU can engage in to make yourself feel better. Depression is not a weakness  People suffer from depression for a variety of reasons, biological, environmental and behavioral.  Research indicates that [employer] is NOT one of the reasons people become depressed. Depression is not something you are powerless against  Evidence suggests that you can directly impact the intensity and duration of depression by what you do and by altering the way you think about certain things. The Downward Spiral    Depression often begins as a drop in mood due to an environmental or biological trigger that makes people feel less like being active. Being less active, in turn, often causes people to experience an even lower mood and feel even less like being active, and so the cycle begins. How can I start feeling better? The first and best way to reverse the downward cycle is to get active! Your body produces its own anti-depressants every time you exercise or do something pleasurable. Regular exercise is one of the very best ways to improve your mood.  In fact some studies have shown that a solid exercise program activities, thoughts and reinforcement you respond to. Doing whats easiest and most doable is the key. Pick 1 or 2 things that are easy and get started feeling better TODAY! * Use the following handout sections to guide you through behavioral and thinking exercises to help you manage your depressive symptoms, improve your functioning and to begin living your life well again. Recommended readings on managing depression    - Feeling Good by Dr. Saul Vaughan. Bonds     - Mind over Diane-Jean Carlos by Michelle Varma and [facility] by Dr. Lewis Rai by Dr. Franki Latham. S[place]ky      Disputation:  Challenging Upsetting Thinking    Examine your thoughts for key words:  1. must, need, got to, have to, should (unrealistic standards)  2. never, always, completely, totally, all everything, everyone (predictions /  labeling)  3. awful, terrible, horrible, unbearable, disaster, worst ever (labeling / predictions)  4. jerk, slob, creep, hypocrite, bully, stupid (labels)  Dispute or question the accuracy of the questionable thoughts. 1. Am I upsetting myself unnecessarily? How can I see this another way? 2. Is my thinking working for or against me? How could I view this in a less upsetting way? 3. What am I demanding must happen? What do I want or prefer, rather than need? 4. Am I making something too terrible? Is it really that awful? What would be so terrible about that? 5. Am I labeling a person? What is the action that I dont like? 6. Whats untrue about my thoughts? How can I stick to the facts? Whats the proof for what I am thinking or believing about this? 7. Am I using extreme, black-and-white language? What less extreme words might be more accurate? 8. Am I fortune telling or mind reading in a way that gets me upset or unhappy? What are the odds (percent chance -- e.g., there is a 5% chance. ..) that it will really turn out the way Im thinking or imagining? 9. What are my options in this situation? How would I like to respond? 10. Create more moderate, helpful, or realistic statements to replace the upsetting ones. 11. Have I had any experiences that show that this thought might not be totally true? 12. If my best friend or someone I loved had this thought, what would I tell them? 15. If my best friend or someone I loved knew I was thinking this thought, what would they say to me? What evidence would they point out to me that would suggest that my thought is not completely true? 14. Are there strengths in me or positives in the situation that I am ignoring? Am I underestimating my ability to cope with unfortunate circumstances? 15. When I am not feeling this way, do I think about this situation any differently? How?  16. Have I been in this type of situation before? What happened? What have I learned from prior experiences that could help me now? 17. Five years from now, if I look back on this situation, will I look at it any differently? Will I focus on any different part of my experience? 25. Am I blaming myself for something over which I do not have complete control? The Stress Response and How It Can Affect You   The stress response, or fight or flight response is the emergency reaction system of the body. It is there to keep you safe in emergencies. The stress response includes physical and thought responses to your perception of various situations. When the stress response is turned on, your body may release substances like adrenaline and cortisol. Your organs are programmed to respond in certain ways to situations that are viewed as challenging or threatening. The stress response can work against you. You can turn it on when you dont really need it and, as a result, perceive something as an emergency when its really not. It can turn on when you are just thinking about past or future events.  Harmless, chronic conditions can be intensified by the stress response activating too often, with too much intensity, or for too long. Stress responses can be different for different individuals. Below is a list of some common stress related responses people have. ([place] the responses you have had in the last 2 weeks.)                                                                                                 Physical Responses   Muscle aches   Insomnia   ? Heart rate   Headache   Weight gain   Nausea   Constipation   Dry mouth   Muscle twitching  Weight loss   Low energy   Weakness   Tight chest   Diarrhea   Dizziness   Trembling   Stomach cramps  Chills    Hot flashes   Sweating   Pounding heart  Choking feeling  Chest pain   Leg cramps   Numb hands/feet Dry throat   Appetite change  Face flushing   ? Blood pressure  Light-headedness  Feeling faint        Trouble swallowing   Rash ? Urination   Neck pain             Tingling hands/feet                                                                                             Emotional and Thought Responses   Restlessness   Agitation   Insecurity             Worthlessness   Anxiety   Stress    Depression             Hopelessness   Guilt    Defensiveness  Anger            Racing thoughts   Nightmares   Intense thinking  Sensitivity            Expecting the worst   Numbness   Lack of motivation  Mood swings             Forgetfulness   ? Concentration  Rigidity              Preoccupation  Intolerance                                                Behavioral Responses   Avoidance   Withdrawal   Neglect   ? Alcohol use    Smoking   ? Eating   Arguing        Poor appearance   ? Spending   Poor hygiene   ? Eating   Seeking reassurance   Nail biting   Skin picking   ? Talking         ? Body checking   Sexual problems  Foot tapping   Fidgeting Rapid walking    ? Exercise   Teeth clenching           Multitasking   Aggressive speaking       ? Fun activities  ? Sleeping      ?  Relaxing activities

## 2019-09-24 ENCOUNTER — CARE COORDINATION (OUTPATIENT)
Dept: CARE COORDINATION | Age: 54
End: 2019-09-24

## 2019-09-25 RX ORDER — FOLIC ACID/MULTIVIT,IRON,MINER 0.4MG-18MG
TABLET ORAL
Qty: 30 TABLET | Refills: 3 | Status: SHIPPED | OUTPATIENT
Start: 2019-09-25 | End: 2020-02-24 | Stop reason: SDUPTHER

## 2019-10-09 ENCOUNTER — OFFICE VISIT (OUTPATIENT)
Dept: FAMILY MEDICINE CLINIC | Age: 54
End: 2019-10-09
Payer: MEDICARE

## 2019-10-09 VITALS
WEIGHT: 105 LBS | DIASTOLIC BLOOD PRESSURE: 70 MMHG | HEIGHT: 64 IN | BODY MASS INDEX: 17.93 KG/M2 | SYSTOLIC BLOOD PRESSURE: 102 MMHG

## 2019-10-09 DIAGNOSIS — E46 MALNUTRITION, CALORIE (HCC): ICD-10-CM

## 2019-10-09 DIAGNOSIS — R63.6 UNDERWEIGHT: Primary | ICD-10-CM

## 2019-10-09 DIAGNOSIS — F41.9 ANXIETY AND DEPRESSION: ICD-10-CM

## 2019-10-09 DIAGNOSIS — F32.A ANXIETY AND DEPRESSION: ICD-10-CM

## 2019-10-09 PROCEDURE — G8427 DOCREV CUR MEDS BY ELIG CLIN: HCPCS | Performed by: FAMILY MEDICINE

## 2019-10-09 PROCEDURE — G8419 CALC BMI OUT NRM PARAM NOF/U: HCPCS | Performed by: FAMILY MEDICINE

## 2019-10-09 PROCEDURE — 4004F PT TOBACCO SCREEN RCVD TLK: CPT | Performed by: FAMILY MEDICINE

## 2019-10-09 PROCEDURE — G8484 FLU IMMUNIZE NO ADMIN: HCPCS | Performed by: FAMILY MEDICINE

## 2019-10-09 PROCEDURE — 3017F COLORECTAL CA SCREEN DOC REV: CPT | Performed by: FAMILY MEDICINE

## 2019-10-09 PROCEDURE — 99214 OFFICE O/P EST MOD 30 MIN: CPT | Performed by: FAMILY MEDICINE

## 2019-10-09 ASSESSMENT — ENCOUNTER SYMPTOMS
EYE REDNESS: 0
VOMITING: 0
EYE DISCHARGE: 0
NAUSEA: 0
SHORTNESS OF BREATH: 0
COUGH: 1

## 2019-10-11 RX ORDER — MEGESTROL ACETATE 20 MG/1
20 TABLET ORAL DAILY
Qty: 30 TABLET | Refills: 1 | Status: SHIPPED | OUTPATIENT
Start: 2019-10-11 | End: 2019-11-19 | Stop reason: SDUPTHER

## 2019-10-15 RX ORDER — OMEPRAZOLE 20 MG/1
CAPSULE, DELAYED RELEASE ORAL
Qty: 30 CAPSULE | Refills: 3 | Status: SHIPPED | OUTPATIENT
Start: 2019-10-15 | End: 2020-03-31 | Stop reason: SDUPTHER

## 2019-11-15 ENCOUNTER — HOSPITAL ENCOUNTER (EMERGENCY)
Age: 54
Discharge: HOME | End: 2019-11-15
Attending: NURSE PRACTITIONER
Payer: MEDICARE

## 2019-11-15 VITALS
HEIGHT: 64 IN | SYSTOLIC BLOOD PRESSURE: 116 MMHG | RESPIRATION RATE: 14 BRPM | HEART RATE: 72 BPM | OXYGEN SATURATION: 100 % | TEMPERATURE: 98 F | BODY MASS INDEX: 18.27 KG/M2 | WEIGHT: 107 LBS | DIASTOLIC BLOOD PRESSURE: 69 MMHG

## 2019-11-15 DIAGNOSIS — R10.31 ABDOMINAL PAIN, RIGHT LOWER QUADRANT: Primary | ICD-10-CM

## 2019-11-15 LAB
BASOPHILS # BLD: 0.9 %
BASOPHILS ABSOLUTE: 0.1 THOU/MM3 (ref 0–0.1)
BILIRUBIN URINE: NEGATIVE
BLOOD, URINE: NEGATIVE
CHARACTER, URINE: CLEAR
COLOR: YELLOW
EOSINOPHIL # BLD: 1.4 %
EOSINOPHILS ABSOLUTE: 0.1 THOU/MM3 (ref 0–0.4)
GLUCOSE, URINE: NEGATIVE MG/DL
HCT VFR BLD CALC: 39.4 % (ref 37–47)
HEMOGLOBIN: 12.5 GM/DL (ref 12–16)
IMMATURE GRANS (ABS): 0.04 THOU/MM3 (ref 0–0.07)
IMMATURE GRANULOCYTES: 0.5 %
KETONES, URINE: NEGATIVE
LEUKOCYTES, UA: NEGATIVE
LYMPHOCYTES # BLD: 32 %
LYMPHOCYTES ABSOLUTE: 2.5 THOU/MM3 (ref 1–4.8)
MCH RBC QN AUTO: 29.4 PG (ref 27–31)
MCHC RBC AUTO-ENTMCNC: 31.7 GM/DL (ref 33–37)
MCV RBC AUTO: 93 FL (ref 81–99)
MONOCYTES # BLD: 8.2 %
MONOCYTES ABSOLUTE: 0.6 THOU/MM3 (ref 0.4–1.3)
NITRITE, URINE: NEGATIVE
NUCLEATED RED BLOOD CELLS: 0 /100 WBC
PDW BLD-RTO: 14.4 % (ref 11.5–14.5)
PH UA: 5.5 (ref 5–9)
PLATELET # BLD: 257 THOU/MM3 (ref 130–400)
PMV BLD AUTO: 9.1 FL (ref 7.4–10.4)
PROTEIN UA: NEGATIVE MG/DL
RBC # BLD: 4.25 MILL/MM3 (ref 4.2–5.4)
REFLEX TO URINE C & S: NORMAL
SEG NEUTROPHILS: 57 %
SEGMENTED NEUTROPHILS ABSOLUTE COUNT: 4.5 THOU/MM3 (ref 1.8–7.7)
SPECIFIC GRAVITY UA: 1.02 (ref 1–1.03)
UROBILINOGEN, URINE: 0.2 EU/DL (ref 0–1)
WBC # BLD: 7.9 THOU/MM3 (ref 4.8–10.8)

## 2019-11-15 PROCEDURE — 81003 URINALYSIS AUTO W/O SCOPE: CPT

## 2019-11-15 PROCEDURE — 99213 OFFICE O/P EST LOW 20 MIN: CPT | Performed by: NURSE PRACTITIONER

## 2019-11-15 PROCEDURE — 36415 COLL VENOUS BLD VENIPUNCTURE: CPT

## 2019-11-15 PROCEDURE — 99214 OFFICE O/P EST MOD 30 MIN: CPT

## 2019-11-15 PROCEDURE — 6360000002 HC RX W HCPCS: Performed by: NURSE PRACTITIONER

## 2019-11-15 PROCEDURE — 85025 COMPLETE CBC W/AUTO DIFF WBC: CPT

## 2019-11-15 PROCEDURE — 96372 THER/PROPH/DIAG INJ SC/IM: CPT

## 2019-11-15 RX ORDER — DICYCLOMINE HYDROCHLORIDE 10 MG/ML
20 INJECTION INTRAMUSCULAR ONCE
Status: COMPLETED | OUTPATIENT
Start: 2019-11-15 | End: 2019-11-15

## 2019-11-15 RX ORDER — DICYCLOMINE HYDROCHLORIDE 10 MG/1
10 CAPSULE ORAL
Qty: 30 CAPSULE | Refills: 0 | Status: SHIPPED | OUTPATIENT
Start: 2019-11-15 | End: 2020-07-13 | Stop reason: ALTCHOICE

## 2019-11-15 RX ADMIN — DICYCLOMINE HYDROCHLORIDE 20 MG: 20 INJECTION, SOLUTION INTRAMUSCULAR at 09:43

## 2019-11-15 ASSESSMENT — ENCOUNTER SYMPTOMS
BLOOD IN STOOL: 0
DIARRHEA: 1
NAUSEA: 0
VOMITING: 0
TROUBLE SWALLOWING: 0
SHORTNESS OF BREATH: 1
ABDOMINAL PAIN: 1

## 2019-11-15 ASSESSMENT — PAIN SCALES - GENERAL
PAINLEVEL_OUTOF10: 3
PAINLEVEL_OUTOF10: 10

## 2019-11-15 ASSESSMENT — PAIN DESCRIPTION - ORIENTATION: ORIENTATION: RIGHT;LOWER

## 2019-11-15 ASSESSMENT — PAIN DESCRIPTION - LOCATION: LOCATION: ABDOMEN

## 2019-11-15 ASSESSMENT — PAIN - FUNCTIONAL ASSESSMENT: PAIN_FUNCTIONAL_ASSESSMENT: PREVENTS OR INTERFERES SOME ACTIVE ACTIVITIES AND ADLS

## 2019-11-15 ASSESSMENT — PAIN DESCRIPTION - DESCRIPTORS: DESCRIPTORS: ACHING;STABBING

## 2019-11-15 ASSESSMENT — PAIN DESCRIPTION - PROGRESSION: CLINICAL_PROGRESSION: GRADUALLY WORSENING

## 2019-11-15 ASSESSMENT — PAIN DESCRIPTION - PAIN TYPE: TYPE: ACUTE PAIN

## 2019-11-15 ASSESSMENT — PAIN DESCRIPTION - FREQUENCY: FREQUENCY: CONTINUOUS

## 2019-11-15 ASSESSMENT — PAIN DESCRIPTION - ONSET: ONSET: AWAKENED FROM SLEEP

## 2019-11-19 ENCOUNTER — OFFICE VISIT (OUTPATIENT)
Dept: FAMILY MEDICINE CLINIC | Age: 54
End: 2019-11-19
Payer: MEDICARE

## 2019-11-19 VITALS
SYSTOLIC BLOOD PRESSURE: 110 MMHG | HEIGHT: 64 IN | BODY MASS INDEX: 19.12 KG/M2 | DIASTOLIC BLOOD PRESSURE: 70 MMHG | WEIGHT: 112 LBS

## 2019-11-19 DIAGNOSIS — F32.A ANXIETY AND DEPRESSION: ICD-10-CM

## 2019-11-19 DIAGNOSIS — F41.9 ANXIETY AND DEPRESSION: ICD-10-CM

## 2019-11-19 DIAGNOSIS — F41.9 ANXIETY: ICD-10-CM

## 2019-11-19 DIAGNOSIS — H69.82 DYSFUNCTION OF LEFT EUSTACHIAN TUBE: ICD-10-CM

## 2019-11-19 DIAGNOSIS — F33.2 SEVERE EPISODE OF RECURRENT MAJOR DEPRESSIVE DISORDER, WITHOUT PSYCHOTIC FEATURES (HCC): ICD-10-CM

## 2019-11-19 DIAGNOSIS — F17.200 CURRENT EVERY DAY SMOKER: ICD-10-CM

## 2019-11-19 PROCEDURE — 4004F PT TOBACCO SCREEN RCVD TLK: CPT | Performed by: FAMILY MEDICINE

## 2019-11-19 PROCEDURE — 99213 OFFICE O/P EST LOW 20 MIN: CPT | Performed by: FAMILY MEDICINE

## 2019-11-19 PROCEDURE — 3017F COLORECTAL CA SCREEN DOC REV: CPT | Performed by: FAMILY MEDICINE

## 2019-11-19 PROCEDURE — G8484 FLU IMMUNIZE NO ADMIN: HCPCS | Performed by: FAMILY MEDICINE

## 2019-11-19 PROCEDURE — G8427 DOCREV CUR MEDS BY ELIG CLIN: HCPCS | Performed by: FAMILY MEDICINE

## 2019-11-19 PROCEDURE — G8420 CALC BMI NORM PARAMETERS: HCPCS | Performed by: FAMILY MEDICINE

## 2019-11-19 RX ORDER — BUPROPION HYDROCHLORIDE 150 MG/1
150 TABLET, EXTENDED RELEASE ORAL 2 TIMES DAILY
Qty: 60 TABLET | Refills: 1 | Status: SHIPPED | OUTPATIENT
Start: 2019-11-19 | End: 2020-04-14 | Stop reason: ALTCHOICE

## 2019-11-19 RX ORDER — FLUTICASONE PROPIONATE 50 MCG
2 SPRAY, SUSPENSION (ML) NASAL DAILY
Qty: 1 BOTTLE | Refills: 3 | Status: SHIPPED | OUTPATIENT
Start: 2019-11-19 | End: 2020-04-14 | Stop reason: ALTCHOICE

## 2019-11-19 RX ORDER — BUSPIRONE HYDROCHLORIDE 7.5 MG/1
7.5 TABLET ORAL 2 TIMES DAILY
Qty: 180 TABLET | Refills: 1 | Status: SHIPPED | OUTPATIENT
Start: 2019-11-19 | End: 2020-02-18 | Stop reason: SDUPTHER

## 2019-11-19 RX ORDER — MEGESTROL ACETATE 20 MG/1
20 TABLET ORAL DAILY
Qty: 90 TABLET | Refills: 1 | Status: SHIPPED | OUTPATIENT
Start: 2019-11-19 | End: 2020-08-20

## 2019-11-19 ASSESSMENT — ENCOUNTER SYMPTOMS
EYE REDNESS: 0
SHORTNESS OF BREATH: 0
COUGH: 1
EYE DISCHARGE: 0
NAUSEA: 0
VOMITING: 0

## 2019-12-04 ENCOUNTER — HOSPITAL ENCOUNTER (EMERGENCY)
Age: 54
Discharge: HOME | End: 2019-12-04
Payer: MEDICARE

## 2019-12-04 VITALS
OXYGEN SATURATION: 98 % | SYSTOLIC BLOOD PRESSURE: 168 MMHG | HEART RATE: 96 BPM | BODY MASS INDEX: 19.12 KG/M2 | DIASTOLIC BLOOD PRESSURE: 88 MMHG | HEIGHT: 64 IN | WEIGHT: 112 LBS | RESPIRATION RATE: 18 BRPM | TEMPERATURE: 98 F

## 2019-12-04 DIAGNOSIS — B86 SCABIES: Primary | ICD-10-CM

## 2019-12-04 PROCEDURE — 99213 OFFICE O/P EST LOW 20 MIN: CPT | Performed by: NURSE PRACTITIONER

## 2019-12-04 PROCEDURE — 99212 OFFICE O/P EST SF 10 MIN: CPT

## 2019-12-04 RX ORDER — PERMETHRIN 50 MG/G
CREAM TOPICAL
Qty: 1 TUBE | Refills: 0 | Status: SHIPPED | OUTPATIENT
Start: 2019-12-04 | End: 2020-01-10 | Stop reason: SDUPTHER

## 2019-12-04 ASSESSMENT — ENCOUNTER SYMPTOMS
SHORTNESS OF BREATH: 0
VOMITING: 0
NAUSEA: 0
COUGH: 0

## 2019-12-18 RX ORDER — ACETAMINOPHEN 160 MG
TABLET,CHEWABLE ORAL
Qty: 365 TABLET | Refills: 0 | Status: SHIPPED | OUTPATIENT
Start: 2019-12-18 | End: 2020-11-30

## 2020-01-09 ASSESSMENT — ENCOUNTER SYMPTOMS
CHEST TIGHTNESS: 0
SHORTNESS OF BREATH: 1
WHEEZING: 1
COUGH: 1
SINUS PRESSURE: 0

## 2020-01-09 NOTE — PROGRESS NOTES
[address] [place] [address] [address], [place]  Phone:  [phone]  AGD:[phone]       Name: Vijay Tijerina  : 1965    Chief Complaint   Patient presents with    Rash     had scabies  out of the cream that was placed on 3 weeks ago       Chest Congestion    Cough       HPI:     Vijay Tijerina is a 49 y.o. female who presents today for evaluation of cough, wheezing, and SOB as well as a rash and itching on her hands. She has a h/o scabies. She continues to smoke, but has been smoking less the past few days. Cough is productive of sputum. No fevers. No nasal congestion. No sinus pressure. She has itching and scabbing in the webs of her fingers. She has had scabies in the past which she believes she got from work. Current Outpatient Medications:     permethrin (ELIMITE) 5 % cream, Apply topically as directed, Disp: 2 Tube, Rfl: 0    predniSONE (DELTASONE) 20 MG tablet, Take 2 tablets by mouth daily, Disp: 10 tablet, Rfl: 0    amoxicillin-clavulanate (AUGMENTIN) 875-125 MG per tablet, Take 1 tablet by mouth 2 times daily for 10 days, Disp: 20 tablet, Rfl: 0    benzonatate (TESSALON) 100 MG capsule, Take 1 capsule by mouth 3 times daily as needed for Cough, Disp: 30 capsule, Rfl: 0    RA ASPIRIN ADULT LOW STRENGTH 81 MG chewable tablet, chew and swallow 1 tablet by mouth once daily, Disp: 365 tablet, Rfl: 0    megestrol (MEGACE) 20 MG tablet, Take 1 tablet by mouth daily, Disp: 90 tablet, Rfl: 1    sertraline (ZOLOFT) 50 MG tablet, Take 1 tablet by mouth daily, Disp: 90 tablet, Rfl: 1    busPIRone (BUSPAR) 7.5 MG tablet, Take 1 tablet by mouth 2 times daily, Disp: 180 tablet, Rfl: 1    buPROPion (ZYBAN) 150 MG extended release tablet, Take 1 tablet by mouth 2 times daily Take 1/2 tab daily for 3 days, then increase to 1 tab twice daily. , Disp: 60 tablet, Rfl: 1    fluticasone (FLONASE) 50 MCG/ACT nasal spray, 2 sprays by Each Nostril route daily, Disp: 1

## 2020-01-10 ENCOUNTER — OFFICE VISIT (OUTPATIENT)
Dept: FAMILY MEDICINE CLINIC | Age: 55
End: 2020-01-10
Payer: MEDICARE

## 2020-01-10 VITALS
OXYGEN SATURATION: 98 % | SYSTOLIC BLOOD PRESSURE: 110 MMHG | HEART RATE: 78 BPM | DIASTOLIC BLOOD PRESSURE: 70 MMHG | BODY MASS INDEX: 19.14 KG/M2 | WEIGHT: 104 LBS | HEIGHT: 62 IN

## 2020-01-10 PROCEDURE — G8926 SPIRO NO PERF OR DOC: HCPCS | Performed by: FAMILY MEDICINE

## 2020-01-10 PROCEDURE — G8420 CALC BMI NORM PARAMETERS: HCPCS | Performed by: FAMILY MEDICINE

## 2020-01-10 PROCEDURE — G8484 FLU IMMUNIZE NO ADMIN: HCPCS | Performed by: FAMILY MEDICINE

## 2020-01-10 PROCEDURE — G8427 DOCREV CUR MEDS BY ELIG CLIN: HCPCS | Performed by: FAMILY MEDICINE

## 2020-01-10 PROCEDURE — 3017F COLORECTAL CA SCREEN DOC REV: CPT | Performed by: FAMILY MEDICINE

## 2020-01-10 PROCEDURE — 3023F SPIROM DOC REV: CPT | Performed by: FAMILY MEDICINE

## 2020-01-10 PROCEDURE — 99214 OFFICE O/P EST MOD 30 MIN: CPT | Performed by: FAMILY MEDICINE

## 2020-01-10 PROCEDURE — 4004F PT TOBACCO SCREEN RCVD TLK: CPT | Performed by: FAMILY MEDICINE

## 2020-01-10 RX ORDER — AMOXICILLIN AND CLAVULANATE POTASSIUM 875; 125 MG/1; MG/1
1 TABLET, FILM COATED ORAL 2 TIMES DAILY
Qty: 20 TABLET | Refills: 0 | Status: SHIPPED | OUTPATIENT
Start: 2020-01-10 | End: 2020-01-20

## 2020-01-10 RX ORDER — BENZONATATE 100 MG/1
100 CAPSULE ORAL 3 TIMES DAILY PRN
Qty: 30 CAPSULE | Refills: 0 | Status: SHIPPED | OUTPATIENT
Start: 2020-01-10 | End: 2020-01-17

## 2020-01-10 RX ORDER — PREDNISONE 20 MG/1
40 TABLET ORAL DAILY
Qty: 10 TABLET | Refills: 0 | Status: SHIPPED | OUTPATIENT
Start: 2020-01-10 | End: 2020-02-18

## 2020-01-10 RX ORDER — PERMETHRIN 50 MG/G
CREAM TOPICAL
Qty: 2 TUBE | Refills: 0 | Status: SHIPPED | OUTPATIENT
Start: 2020-01-10 | End: 2020-02-18

## 2020-01-10 ASSESSMENT — ENCOUNTER SYMPTOMS
RHINORRHEA: 0
EYE REDNESS: 0
COLOR CHANGE: 1
NAUSEA: 0
VOMITING: 0

## 2020-02-18 ENCOUNTER — OFFICE VISIT (OUTPATIENT)
Dept: FAMILY MEDICINE CLINIC | Age: 55
End: 2020-02-18
Payer: MEDICARE

## 2020-02-18 VITALS
HEIGHT: 62 IN | HEART RATE: 88 BPM | WEIGHT: 100.4 LBS | BODY MASS INDEX: 18.48 KG/M2 | DIASTOLIC BLOOD PRESSURE: 84 MMHG | SYSTOLIC BLOOD PRESSURE: 132 MMHG

## 2020-02-18 PROCEDURE — G8427 DOCREV CUR MEDS BY ELIG CLIN: HCPCS | Performed by: FAMILY MEDICINE

## 2020-02-18 PROCEDURE — 4004F PT TOBACCO SCREEN RCVD TLK: CPT | Performed by: FAMILY MEDICINE

## 2020-02-18 PROCEDURE — G8419 CALC BMI OUT NRM PARAM NOF/U: HCPCS | Performed by: FAMILY MEDICINE

## 2020-02-18 PROCEDURE — 3017F COLORECTAL CA SCREEN DOC REV: CPT | Performed by: FAMILY MEDICINE

## 2020-02-18 PROCEDURE — G8484 FLU IMMUNIZE NO ADMIN: HCPCS | Performed by: FAMILY MEDICINE

## 2020-02-18 PROCEDURE — 99214 OFFICE O/P EST MOD 30 MIN: CPT | Performed by: FAMILY MEDICINE

## 2020-02-18 RX ORDER — MIRTAZAPINE 15 MG/1
15 TABLET, FILM COATED ORAL NIGHTLY
Qty: 30 TABLET | Refills: 2 | Status: SHIPPED | OUTPATIENT
Start: 2020-02-18 | End: 2020-06-29

## 2020-02-18 RX ORDER — BUSPIRONE HYDROCHLORIDE 15 MG/1
15 TABLET ORAL 2 TIMES DAILY
Qty: 60 TABLET | Refills: 2 | Status: SHIPPED | OUTPATIENT
Start: 2020-02-18 | End: 2020-06-29

## 2020-02-18 ASSESSMENT — ENCOUNTER SYMPTOMS
VOMITING: 0
NAUSEA: 0
SHORTNESS OF BREATH: 0
RHINORRHEA: 0
COUGH: 0
EYE REDNESS: 0
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
## 2020-02-19 ENCOUNTER — TELEPHONE (OUTPATIENT)
Dept: SPIRITUAL SERVICES | Facility: CLINIC | Age: 55
End: 2020-02-19

## 2020-02-24 ENCOUNTER — TELEPHONE (OUTPATIENT)
Dept: SPIRITUAL SERVICES | Facility: CLINIC | Age: 55
End: 2020-02-24

## 2020-02-24 RX ORDER — FOLIC ACID/MULTIVIT,IRON,MINER 0.4MG-18MG
TABLET ORAL
Qty: 30 TABLET | Refills: 3 | Status: SHIPPED | OUTPATIENT
Start: 2020-02-24 | End: 2021-04-22 | Stop reason: SDUPTHER

## 2020-02-24 RX ORDER — CHOLECALCIFEROL (VITAMIN D3) 125 MCG
CAPSULE ORAL
Qty: 90 TABLET | Refills: 1 | Status: SHIPPED | OUTPATIENT
Start: 2020-02-24 | End: 2020-04-14 | Stop reason: ALTCHOICE

## 2020-02-24 NOTE — TELEPHONE ENCOUNTER
Albina Bundy called requesting a refill on the following medications:  Requested Prescriptions     Pending Prescriptions Disp Refills    melatonin (RA MELATONIN) 5 MG TABS tablet 90 tablet 1     Sig: take 1 tablet by mouth once daily    Multiple Vitamins-Minerals (RA ONE DAILY MAXIMUM) TABS 30 tablet 3     Sig: take 1 tablet by mouth once daily       Date of last visit: 2/18/2020  Date of next visit (if applicable):3/30/2020  Date of last refill: Melatonin 07/01/2019 RA one daily max tablet 09/25/2019  Pharmacy Name: [facility]      Thanks,  [facility], [address] [place] [address]

## 2020-02-25 ENCOUNTER — TELEPHONE (OUTPATIENT)
Dept: SPIRITUAL SERVICES | Facility: CLINIC | Age: 55
End: 2020-02-25

## 2020-02-25 NOTE — TELEPHONE ENCOUNTER
I contacted Jolynn Zelaya via telephone call to offer support, if desired. She stated being at work. When asked if she would like to set an appointment to meet together, she stated once again \"I would like to talk with my . I would like to go together. \"   Linnea Whittington offered to send a follow-up letter with brochure and contact information she could review with her , and then schedule an appointment.

## 2020-03-31 RX ORDER — OMEPRAZOLE 20 MG/1
CAPSULE, DELAYED RELEASE ORAL
Qty: 30 CAPSULE | Refills: 3 | Status: SHIPPED | OUTPATIENT
Start: 2020-03-31 | End: 2020-09-08

## 2020-03-31 RX ORDER — VENLAFAXINE HYDROCHLORIDE 150 MG/1
CAPSULE, EXTENDED RELEASE ORAL
Qty: 90 CAPSULE | Refills: 1 | Status: SHIPPED | OUTPATIENT
Start: 2020-03-31 | End: 2020-07-13 | Stop reason: ALTCHOICE

## 2020-04-14 ENCOUNTER — HOSPITAL ENCOUNTER (EMERGENCY)
Age: 55
Discharge: HOME | End: 2020-04-14
Attending: NURSE PRACTITIONER
Payer: MEDICARE

## 2020-04-14 VITALS
HEIGHT: 64 IN | BODY MASS INDEX: 17.93 KG/M2 | SYSTOLIC BLOOD PRESSURE: 140 MMHG | HEART RATE: 78 BPM | RESPIRATION RATE: 18 BRPM | OXYGEN SATURATION: 100 % | TEMPERATURE: 98.1 F | WEIGHT: 105 LBS | DIASTOLIC BLOOD PRESSURE: 81 MMHG

## 2020-04-14 PROCEDURE — 99212 OFFICE O/P EST SF 10 MIN: CPT

## 2020-04-14 PROCEDURE — 99213 OFFICE O/P EST LOW 20 MIN: CPT | Performed by: NURSE PRACTITIONER

## 2020-04-14 RX ORDER — PREDNISONE 20 MG/1
20 TABLET ORAL 2 TIMES DAILY
Qty: 10 TABLET | Refills: 0 | Status: SHIPPED | OUTPATIENT
Start: 2020-04-14 | End: 2020-04-19

## 2020-04-14 ASSESSMENT — ENCOUNTER SYMPTOMS
ABDOMINAL PAIN: 0
TROUBLE SWALLOWING: 0
SHORTNESS OF BREATH: 0

## 2020-04-14 ASSESSMENT — PAIN DESCRIPTION - FREQUENCY: FREQUENCY: INTERMITTENT

## 2020-04-14 ASSESSMENT — PAIN DESCRIPTION - PROGRESSION: CLINICAL_PROGRESSION: NOT CHANGED

## 2020-04-14 ASSESSMENT — PAIN DESCRIPTION - DESCRIPTORS: DESCRIPTORS: ACHING

## 2020-04-14 ASSESSMENT — PAIN DESCRIPTION - LOCATION: LOCATION: RIB CAGE

## 2020-04-14 ASSESSMENT — PAIN DESCRIPTION - ONSET: ONSET: ON-GOING

## 2020-04-14 ASSESSMENT — PAIN DESCRIPTION - PAIN TYPE: TYPE: ACUTE PAIN

## 2020-04-14 ASSESSMENT — PAIN DESCRIPTION - ORIENTATION: ORIENTATION: LEFT

## 2020-04-14 ASSESSMENT — PAIN SCALES - GENERAL: PAINLEVEL_OUTOF10: 10

## 2020-04-14 NOTE — ED PROVIDER NOTES
[address]  Urgent Care Encounter      CHIEF COMPLAINT       Chief Complaint   Patient presents with    Rib Pain       Nurses Notes reviewed and I agree except as noted in the HPI. HISTORY OF PRESENT ILLNESS   Dipika Ruiz is a 49 y.o. female who presents with complaint of right lower rib pain that started yesterday. Patient said she has a cough and believes she has pleurisy. She denies any fever, chills or any acute respiratory symptoms. She has COPD and states she had similar symptoms 10 years ago and was given steroids. She is in no acute distress. REVIEW OF SYSTEMS     Review of Systems   Constitutional: Negative for activity change, appetite change, chills, fatigue and fever. HENT: Negative for trouble swallowing. Eyes: Negative for visual disturbance. Respiratory: Negative for shortness of breath. COPD   Cardiovascular: Negative for chest pain. Gastrointestinal: Negative for abdominal pain. GERD   Musculoskeletal: Negative for arthralgias. Skin: Negative for rash. Allergic/Immunologic: Negative for environmental allergies. Neurological: Negative for headaches. TIA   Psychiatric/Behavioral: Negative for sleep disturbance. Schizoaffective disorder bipolar       PAST MEDICAL HISTORY         Diagnosis Date    COPD (chronic obstructive pulmonary disease) (HCC)     GERD (gastroesophageal reflux disease)     Schizoaffective disorder, bipolar type with good prognostic features ([facility] [address]     TIA (transient ischemic attack)        SURGICAL HISTORY     Patient  has a past surgical history that includes Tonsillectomy; Tubal ligation; Appendectomy; and Carpal tunnel release (Right, 03/2019).     CURRENT MEDICATIONS       Discharge Medication List as of 4/14/2020  9:29 AM      CONTINUE these medications which have NOT CHANGED    Details   omeprazole (PRILOSEC) 20 MG delayed release capsule take 1 capsule by mouth once daily, Disp-30 capsule, Head: Normocephalic and atraumatic. Eyes:      Conjunctiva/sclera:      Right eye: Right conjunctiva is not injected. Left eye: Left conjunctiva is not injected. Pupils: Pupils are equal.   Neck:      Musculoskeletal: Normal range of motion and neck supple. Cardiovascular:      Rate and Rhythm: Normal rate and regular rhythm. Heart sounds: S1 normal and S2 normal. No murmur. No gallop. Pulmonary:      Effort: Pulmonary effort is normal.      Breath sounds: Decreased air movement present. Examination of the right-lower field reveals decreased breath sounds. Examination of the left-lower field reveals decreased breath sounds. Decreased breath sounds present. No wheezing or rhonchi. Musculoskeletal: Normal range of motion. Right knee: She exhibits normal range of motion. Left knee: She exhibits normal range of motion. Skin:     General: Skin is warm and dry. Capillary Refill: Capillary refill takes 2 to 3 seconds. Neurological:      Mental Status: She is alert and oriented to person, place, and time. Psychiatric:         Mood and Affect: Mood normal.         Behavior: Behavior normal.         DIAGNOSTIC RESULTS   Labs: No results found for this visit on 04/14/20. IMAGING:    URGENT CARE COURSE:     Vitals:    04/14/20 0905   BP: (!) 140/81   Pulse: 78   Resp: 18   Temp: 98.1 °F (36.7 °C)   TempSrc: Oral   SpO2: 100%   Weight: 105 lb (47.6 kg)   Height: 5' 4\" (1.626 m)       Medications - No data to display  PROCEDURES:  None  FINAL IMPRESSION      1. Pleuritic chest pain        DISPOSITION/PLAN   DISPOSITION Decision To Discharge 04/14/2020 09:26:43 AM  Patient presented with right lower rib pain and pain with deep breath. She believes she has pleurisy. Plan: On exam lungs are diminished but clear. She has tenderness over the right lateral and anterior rib area.   I explained to her that steroids or anti-inflammatories would help relieve the pain but it may take several

## 2020-04-16 ENCOUNTER — TELEPHONE (OUTPATIENT)
Dept: FAMILY MEDICINE CLINIC | Age: 55
End: 2020-04-16

## 2020-04-16 NOTE — LETTER
April 16, 2020    [address] [place] [address]    Dear Kemi Doherty,    This letter is regarding your Emergency Department (ED) visit at [facility] on 4/14/20. Mac Reeves wanted to make sure that you understand your discharge instructions and that you were able to fill any prescriptions that may have been ordered for you. Please contact the office at the above phone number if the ED advised you to follow up with Mac Reeves, or if you have any further questions or needs. Also did you know -   *Visiting the ED for a non-emergency could result in higher co-pays than you would normally be subject to paying? [facility]) practices can often offer you an appointment on the same day that you call for acute issues. *We have some [facility] offices that offer Walk-in appointments; check our website for availability in your community, www. [email or web address].      *Evisits are now available for patients for through [address] If you do not have MyChart and are interested, please contact the office and a staff member may assist you or go to [email or web address].     Sincerely,   Radha Smith MD and your [facility]

## 2020-05-11 ENCOUNTER — VIRTUAL VISIT (OUTPATIENT)
Dept: FAMILY MEDICINE CLINIC | Age: 55
End: 2020-05-11
Payer: MEDICARE

## 2020-05-11 PROCEDURE — 99213 OFFICE O/P EST LOW 20 MIN: CPT | Performed by: FAMILY MEDICINE

## 2020-05-11 PROCEDURE — G8428 CUR MEDS NOT DOCUMENT: HCPCS | Performed by: FAMILY MEDICINE

## 2020-05-11 PROCEDURE — 3017F COLORECTAL CA SCREEN DOC REV: CPT | Performed by: FAMILY MEDICINE

## 2020-05-11 RX ORDER — CEPHALEXIN 500 MG/1
500 CAPSULE ORAL 2 TIMES DAILY
Qty: 10 CAPSULE | Refills: 0 | Status: SHIPPED | OUTPATIENT
Start: 2020-05-11 | End: 2020-05-16

## 2020-05-11 ASSESSMENT — ENCOUNTER SYMPTOMS
VOMITING: 0
NAUSEA: 0

## 2020-05-11 NOTE — PROGRESS NOTES
palsy        [] Abnormal-         Skin:        [] No significant exanthematous lesions or discoloration noted on facial skin         [] Abnormal-            Psychiatric:       [] Normal Affect [] No Hallucinations        [] Abnormal-       ASSESSMENT/PLAN:  1. Acute cystitis without hematuria  - Symptoms are consistent with a UTI so will treat with increased hydration and antibiotics. - cephALEXin (KEFLEX) 500 MG capsule; Take 1 capsule by mouth 2 times daily for 5 days  Dispense: 10 capsule; Refill: 0      Return if symptoms worsen or fail to improve. Waqar Lewis is a 49 y.o. female being evaluated by a Virtual Visit (video visit) encounter to address concerns as mentioned above. A caregiver was present when appropriate. Due to this being a TeleHealth encounter (During [facility]- public health emergency), evaluation of the following organ systems was limited: Vitals/Constitutional/EENT/Resp/CV/GI//MS/Neuro/Skin/Heme-Lymph-Imm. Pursuant to the emergency declaration under the [address] [place] [address] authority and the [employer] Act, this Virtual Visit was conducted with patient's (and/or legal guardian's) consent, to reduce the patient's risk of exposure to COVID-19 and provide necessary medical care. The patient (and/or legal guardian) has also been advised to contact this office for worsening conditions or problems, and seek emergency medical treatment and/or call 911 if deemed necessary. Patient identification was verified at the start of the visit: Yes      Services were provided through a video synchronous discussion virtually to substitute for in-person clinic visit. Patient and provider were located at their individual homes. --Rachele Galvez MD on 5/11/2020 at 11:15 AM    An electronic signature was used to authenticate this note.

## 2020-06-12 ENCOUNTER — TELEPHONE (OUTPATIENT)
Dept: FAMILY MEDICINE CLINIC | Age: 55
End: 2020-06-12

## 2020-06-12 ENCOUNTER — HOSPITAL ENCOUNTER (EMERGENCY)
Age: 55
Discharge: HOME | End: 2020-06-12
Payer: MEDICARE

## 2020-06-12 VITALS
TEMPERATURE: 98.7 F | SYSTOLIC BLOOD PRESSURE: 141 MMHG | HEIGHT: 64 IN | WEIGHT: 103 LBS | BODY MASS INDEX: 17.58 KG/M2 | DIASTOLIC BLOOD PRESSURE: 102 MMHG | HEART RATE: 89 BPM | OXYGEN SATURATION: 97 % | RESPIRATION RATE: 16 BRPM

## 2020-06-12 PROCEDURE — 99213 OFFICE O/P EST LOW 20 MIN: CPT | Performed by: NURSE PRACTITIONER

## 2020-06-12 PROCEDURE — 99212 OFFICE O/P EST SF 10 MIN: CPT

## 2020-06-12 RX ORDER — PREDNISONE 20 MG/1
20 TABLET ORAL DAILY
Qty: 7 TABLET | Refills: 0 | Status: SHIPPED | OUTPATIENT
Start: 2020-06-12 | End: 2020-06-16 | Stop reason: SDUPTHER

## 2020-06-12 ASSESSMENT — PAIN DESCRIPTION - FREQUENCY: FREQUENCY: CONTINUOUS

## 2020-06-12 ASSESSMENT — PAIN DESCRIPTION - PAIN TYPE: TYPE: ACUTE PAIN

## 2020-06-12 ASSESSMENT — ENCOUNTER SYMPTOMS
NAUSEA: 0
VOMITING: 0
COUGH: 0
SORE THROAT: 0
SHORTNESS OF BREATH: 0

## 2020-06-12 ASSESSMENT — PAIN DESCRIPTION - DESCRIPTORS: DESCRIPTORS: ACHING

## 2020-06-12 ASSESSMENT — PAIN DESCRIPTION - ONSET: ONSET: PROGRESSIVE

## 2020-06-12 ASSESSMENT — PAIN DESCRIPTION - LOCATION: LOCATION: RIB CAGE

## 2020-06-12 ASSESSMENT — PAIN DESCRIPTION - PROGRESSION: CLINICAL_PROGRESSION: GRADUALLY WORSENING

## 2020-06-12 ASSESSMENT — PAIN DESCRIPTION - ORIENTATION: ORIENTATION: RIGHT;UPPER

## 2020-06-12 ASSESSMENT — PAIN SCALES - GENERAL: PAINLEVEL_OUTOF10: 10

## 2020-06-12 NOTE — ED TRIAGE NOTES
Pt to urgent care due to right side rib pain. New onset of symptoms started roughly 2 days ago. Pt stated she has a history of pleurisy and feels like she has it again.

## 2020-06-13 ENCOUNTER — CARE COORDINATION (OUTPATIENT)
Dept: CARE COORDINATION | Age: 55
End: 2020-06-13

## 2020-06-15 NOTE — CARE COORDINATION
Author provided contact information for future reference. Patient/family/caregiver given information for [employer] and agrees to enroll no        I spoke with the patient re: ed visit. Patient was seen and treated for rib pain. Patient states she is feeling much better. Patient is taking prednisone as prescribed. Denies fever, cough or SOB. Discussed COVID-19 precautions. I advised the patient to call the community call center at [phone] for COVID 19-like symptoms for further evalaution and instructions. After hours, please call [facility] COVID-19 hotline number [phone]. Will f/u with patient in two weeks.

## 2020-06-16 ENCOUNTER — VIRTUAL VISIT (OUTPATIENT)
Dept: FAMILY MEDICINE CLINIC | Age: 55
End: 2020-06-16
Payer: MEDICARE

## 2020-06-16 PROCEDURE — G8419 CALC BMI OUT NRM PARAM NOF/U: HCPCS | Performed by: FAMILY MEDICINE

## 2020-06-16 PROCEDURE — 99213 OFFICE O/P EST LOW 20 MIN: CPT | Performed by: FAMILY MEDICINE

## 2020-06-16 PROCEDURE — 3017F COLORECTAL CA SCREEN DOC REV: CPT | Performed by: FAMILY MEDICINE

## 2020-06-16 PROCEDURE — 4004F PT TOBACCO SCREEN RCVD TLK: CPT | Performed by: FAMILY MEDICINE

## 2020-06-16 PROCEDURE — G8428 CUR MEDS NOT DOCUMENT: HCPCS | Performed by: FAMILY MEDICINE

## 2020-06-16 RX ORDER — PREDNISONE 20 MG/1
20 TABLET ORAL DAILY
Qty: 7 TABLET | Refills: 0 | Status: SHIPPED | OUTPATIENT
Start: 2020-06-16 | End: 2020-06-23

## 2020-06-16 ASSESSMENT — ENCOUNTER SYMPTOMS
COUGH: 1
CHEST TIGHTNESS: 1
NAUSEA: 0
VOMITING: 0

## 2020-06-16 NOTE — PROGRESS NOTES
[address] [place] [address] Rd, [address]  [facility]  Phone:  [phone]  Hyun Carpio is a 49 y.o. female evaluated via telephone on 6/16/2020. Consent:  She and/or health care decision maker is aware that that she may receive a bill for this telephone service, depending on her insurance coverage, and has provided verbal consent to proceed: Yes    Documentation:  I communicated with the patient and/or health care decision maker about chest pain. Details of this discussion including any medical advice provided:     Idalmis Callahan was seen at urgent care on 6/12/2020 with right-sided chest pain that was worse with inspiration. She was treated with oral steroids and her pain is improving, but she's not back to normal.  She isn't coughing a whole lot. She denies left-sided or radiating chest pain. She continues to smoke about 1.5 PPD. She is about to start a new job so is anxious and doesn't think she can quit now. Review of Systems   Constitutional: Negative for chills and fever. Respiratory: Positive for cough and chest tightness. Cardiovascular: Negative for chest pain and palpitations. Gastrointestinal: Negative for nausea and vomiting. Psychiatric/Behavioral: The patient is nervous/anxious. Idalmis Callahan was seen today for pleurisy. Diagnoses and all orders for this visit:    Pleurisy  -     Patient's pleuritic chest pain is improving but hasn't resolved so will extend steroid course. -     predniSONE (DELTASONE) 20 MG tablet; Take 1 tablet by mouth daily for 7 days    Current every day smoker        -     She is not ready to quit smoking so was encouraged to cut back. I affirm this is a Patient Initiated Episode with a Patient who has not had a related appointment within my department in the past 7 days or scheduled within the next 24 hours.     Patient identification was verified at the start of the visit: Yes    Total Time: minutes: 12 minutes    Note:

## 2020-06-29 ENCOUNTER — CARE COORDINATION (OUTPATIENT)
Dept: CARE COORDINATION | Age: 55
End: 2020-06-29

## 2020-06-29 RX ORDER — MIRTAZAPINE 15 MG/1
15 TABLET, FILM COATED ORAL NIGHTLY
Qty: 30 TABLET | Refills: 2 | Status: SHIPPED | OUTPATIENT
Start: 2020-06-29 | End: 2020-09-28 | Stop reason: SDUPTHER

## 2020-06-29 RX ORDER — BUSPIRONE HYDROCHLORIDE 15 MG/1
TABLET ORAL
Qty: 60 TABLET | Refills: 2 | Status: SHIPPED | OUTPATIENT
Start: 2020-06-29 | End: 2020-08-28 | Stop reason: SDUPTHER

## 2020-07-13 ENCOUNTER — VIRTUAL VISIT (OUTPATIENT)
Dept: FAMILY MEDICINE CLINIC | Age: 55
End: 2020-07-13
Payer: MEDICARE

## 2020-07-13 PROCEDURE — 3017F COLORECTAL CA SCREEN DOC REV: CPT | Performed by: FAMILY MEDICINE

## 2020-07-13 PROCEDURE — G8427 DOCREV CUR MEDS BY ELIG CLIN: HCPCS | Performed by: FAMILY MEDICINE

## 2020-07-13 PROCEDURE — 99213 OFFICE O/P EST LOW 20 MIN: CPT | Performed by: FAMILY MEDICINE

## 2020-07-13 RX ORDER — PREDNISONE 20 MG/1
20 TABLET ORAL DAILY
Qty: 7 TABLET | Refills: 0 | Status: SHIPPED | OUTPATIENT
Start: 2020-07-13 | End: 2020-07-20

## 2020-07-13 ASSESSMENT — ENCOUNTER SYMPTOMS
WHEEZING: 0
COUGH: 1
SHORTNESS OF BREATH: 0

## 2020-07-13 NOTE — PROGRESS NOTES
2020    TELEHEALTH EVALUATION -- Audio/Visual (During UXPLR-56 public health emergency)    HPI:    Clive Olivera (:  1965) has requested an audio/video evaluation for the following concern(s):    4-week follow-up for pleurisy: She was seen by Dr. Waleska Lewis through video visit about 4 weeks ago. She was treated with prednisone. She was seen in urgent care on . Pain was on right side and resolved with prednisone. Now has left sided chest pain that is worse with inspiration. Smoker. No radiation to jaw or arm. Pain is worse with coughing. Had fever yesterday to 98.9. No change in symtpoms with working as a  in the heat. Review of Systems   Constitutional: Negative for chills and fever. Respiratory: Positive for cough. Negative for shortness of breath and wheezing. Cardiovascular: Positive for chest pain. Prior to Visit Medications    Medication Sig Taking?  Authorizing Provider   mirtazapine (REMERON) 15 MG tablet take 1 tablet by mouth nightly Yes Rosanna Mcclellan MD   busPIRone (BUSPAR) 15 MG tablet take 1 tablet by mouth twice a day Yes Rosanna Mcclellan MD   omeprazole (PRILOSEC) 20 MG delayed release capsule take 1 capsule by mouth once daily Yes Rosanna Mcclellan MD   Multiple Vitamins-Minerals (RA ONE DAILY MAXIMUM) TABS take 1 tablet by mouth once daily Yes Herman Moreira MD   RA ASPIRIN ADULT LOW STRENGTH 81 MG chewable tablet chew and swallow 1 tablet by mouth once daily Yes Rosanna Mcclellan MD   megestrol (MEGACE) 20 MG tablet Take 1 tablet by mouth daily Yes Rosanna Mcclellan MD   sertraline (ZOLOFT) 50 MG tablet Take 1 tablet by mouth daily Yes Rosanna Mcclellan MD   naproxen (EC NAPROSYN) 500 MG EC tablet Take 1 tablet by mouth 2 times daily (with meals) Yes Telma Hickman Case, APRN - CNP   SYMBICORT 160-4.5 MCG/ACT AERO inhale 2 puffs by mouth twice a day Yes Rosanna Mcclellan MD   albuterol sulfate HFA (VENTOLIN HFA) 108 (90 Base) MCG/ACT inhaler Inhale 2 puffs into the lungs every 4 hours as needed for Wheezing Yes Kristine Lyons MD       Social History     Tobacco Use    Smoking status: Current Every Day Smoker     Packs/day: 1.00     Years: 37.00     Pack years: 37.00     Types: Cigarettes    Smokeless tobacco: Never Used    Tobacco comment: Not interested at this time   Substance Use Topics    Alcohol use: Yes     Comment: social    Drug use: No        No Known Allergies,   Past Medical History:   Diagnosis Date    COPD (chronic obstructive pulmonary disease) ([facility])     GERD (gastroesophageal reflux disease)     Schizoaffective disorder, bipolar type with good prognostic features ([facility] [address]     TIA (transient ischemic attack)        PHYSICAL EXAMINATION:  [ INSTRUCTIONS:  \"[x]\" Indicates a positive item  \"[]\" Indicates a negative item  -- DELETE ALL ITEMS NOT EXAMINED]    Constitutional: [] Appears well-developed and well-nourished [x] No apparent distress      [x] Abnormal- thin female  Mental status  [x] Alert and awake  [] Oriented to person/place/time [x]Able to follow commands      Eyes:Sclera  [x]  Normal  [] Abnormal -         Discharge [x]  None visible  [] Abnormal -    HENT:   [x] Normocephalic, atraumatic. [] Abnormal       Neck: [x] No visualized mass     Pulmonary/Chest: [x] Respiratory effort normal.  [x] No visualized signs of difficulty breathing or respiratory distress        [] Abnormal-           Skin:        [x] No significant exanthematous lesions or discoloration noted on facial skin         [] Abnormal-            Psychiatric:       [x] Normal Affect [x] No Hallucinations        [] Abnormal-     Other pertinent observable physical exam findings-     ASSESSMENT/PLAN:  1. Pleurisy  Now having left-sided intermittent chest pains worse with deep inspiration that is similar to her right-sided pleurisy she had recently. We will treat with prednisone. No antibiotic is indicated at this time.   Recommend chest x-ray to rule out other causes given the recurrence. He declines EKG and states this is not her heart. - predniSONE (DELTASONE) 20 MG tablet; Take 1 tablet by mouth daily for 7 days  Dispense: 7 tablet; Refill: 0    2. Other chest pain  - XR CHEST STANDARD (2 VW); Future    3. Current every day smoker  Recommend cessation      Return if symptoms worsen or fail to improve. Sunni Duncan is a 49 y.o. female being evaluated by a Virtual Visit (video visit) encounter to address concerns as mentioned above. A caregiver was present when appropriate. Due to this being a TeleHealth encounter (During LDEWT-08 public health emergency), evaluation of the following organ systems was limited: Vitals/Constitutional/EENT/Resp/CV/GI//MS/Neuro/Skin/Heme-Lymph-Imm. Pursuant to the emergency declaration under the [address] [place] [address] authority and the [employer] Act, this Virtual Visit was conducted with patient's (and/or legal guardian's) consent, to reduce the patient's risk of exposure to COVID-19 and provide necessary medical care. The patient (and/or legal guardian) has also been advised to contact this office for worsening conditions or problems, and seek emergency medical treatment and/or call 911 if deemed necessary. Patient identification was verified at the start of the visit: Yes    Total time spent on this encounter: Not billed by time    Services were provided through a video synchronous discussion virtually to substitute for in-person clinic visit. Patient and provider were located at their individual homes. --Elizabeth Carty MD on 7/13/2020 at 11:38 AM    An electronic signature was used to authenticate this note.

## 2020-07-16 ENCOUNTER — TELEPHONE (OUTPATIENT)
Dept: FAMILY MEDICINE CLINIC | Age: 55
End: 2020-07-16

## 2020-07-16 ENCOUNTER — HOSPITAL ENCOUNTER (OUTPATIENT)
Dept: GENERAL RADIOLOGY | Age: 55
Discharge: HOME | End: 2020-07-16
Payer: MEDICARE

## 2020-07-16 ENCOUNTER — HOSPITAL ENCOUNTER (OUTPATIENT)
Age: 55
Discharge: HOME | End: 2020-07-16
Payer: MEDICARE

## 2020-07-16 PROCEDURE — 71046 X-RAY EXAM CHEST 2 VIEWS: CPT

## 2020-07-16 NOTE — TELEPHONE ENCOUNTER
----- Message from Nettie Hickman MD sent at 7/16/2020 12:16 PM EDT -----  No acute findings. Please advise patient.   Nettie Hickman MD

## 2020-08-19 ENCOUNTER — HOSPITAL ENCOUNTER (EMERGENCY)
Age: 55
Discharge: HOME | End: 2020-08-19
Payer: MEDICARE

## 2020-08-19 VITALS
TEMPERATURE: 98 F | RESPIRATION RATE: 16 BRPM | HEIGHT: 64 IN | HEART RATE: 94 BPM | SYSTOLIC BLOOD PRESSURE: 150 MMHG | WEIGHT: 103 LBS | OXYGEN SATURATION: 100 % | DIASTOLIC BLOOD PRESSURE: 75 MMHG | BODY MASS INDEX: 17.58 KG/M2

## 2020-08-19 PROCEDURE — 99213 OFFICE O/P EST LOW 20 MIN: CPT | Performed by: NURSE PRACTITIONER

## 2020-08-19 PROCEDURE — 99214 OFFICE O/P EST MOD 30 MIN: CPT

## 2020-08-19 RX ORDER — PREDNISONE 20 MG/1
40 TABLET ORAL DAILY
Qty: 10 TABLET | Refills: 0 | Status: SHIPPED | OUTPATIENT
Start: 2020-08-19 | End: 2020-08-24

## 2020-08-19 RX ORDER — BENZONATATE 200 MG/1
200 CAPSULE ORAL 3 TIMES DAILY PRN
Qty: 21 CAPSULE | Refills: 0 | Status: SHIPPED | OUTPATIENT
Start: 2020-08-19 | End: 2020-08-26

## 2020-08-19 ASSESSMENT — ENCOUNTER SYMPTOMS
VOMITING: 0
SINUS PRESSURE: 0
COUGH: 1
NAUSEA: 0
SHORTNESS OF BREATH: 0

## 2020-08-19 ASSESSMENT — PAIN DESCRIPTION - ONSET: ONSET: GRADUAL

## 2020-08-19 ASSESSMENT — PAIN - FUNCTIONAL ASSESSMENT: PAIN_FUNCTIONAL_ASSESSMENT: ACTIVITIES ARE NOT PREVENTED

## 2020-08-19 ASSESSMENT — PAIN SCALES - GENERAL: PAINLEVEL_OUTOF10: 2

## 2020-08-19 ASSESSMENT — PAIN DESCRIPTION - PAIN TYPE: TYPE: ACUTE PAIN

## 2020-08-19 ASSESSMENT — PAIN DESCRIPTION - FREQUENCY: FREQUENCY: INTERMITTENT

## 2020-08-19 ASSESSMENT — PAIN DESCRIPTION - ORIENTATION: ORIENTATION: LEFT;ANTERIOR

## 2020-08-19 ASSESSMENT — PAIN DESCRIPTION - LOCATION: LOCATION: CHEST

## 2020-08-19 ASSESSMENT — PAIN DESCRIPTION - PROGRESSION: CLINICAL_PROGRESSION: NOT CHANGED

## 2020-08-19 ASSESSMENT — PAIN DESCRIPTION - DESCRIPTORS: DESCRIPTORS: SHARP

## 2020-08-19 NOTE — ED NOTES
No change in patients condition. Discharge instructions and prescriptions discussed with pt. Pt. Verbalized understanding of info given and ambulated to exit in stable condition.       Lovena Meckel, RN  08/19/20 0069

## 2020-08-19 NOTE — ED PROVIDER NOTES
[address]  Urgent Care Encounter       CHIEF COMPLAINT       Chief Complaint   Patient presents with    Cough    Chest Pain     left sided chest pain with cough and inspiration, hx of pleurisy       Nurses Notes reviewed and I agree except as noted in the HPI. HISTORY OF PRESENT ILLNESS   Sunni Duncan is a 49 y.o. female who presents with complaints of cough and pleuritic chest pain, onset 2 days ago. Pain is in the left lateral chest and occurs with coughing and inspiration. She reports having the same problem several times this summer. Pain does not radiate and there is no associated nausea, dizziness, shortness of breath or diaphoresis. Cough is described as nonproductive. She does have a decreased appetite and mild otalgia. No nausea, vomiting, diarrhea, sore throat or changes in smell or taste. She has been around her grandchildren who have cold-like symptoms. The history is provided by the patient. REVIEW OF SYSTEMS     Review of Systems   Constitutional: Negative for chills and fever. HENT: Negative for congestion and sinus pressure. Respiratory: Positive for cough. Negative for shortness of breath. Cardiovascular: Positive for chest pain (with cough). Gastrointestinal: Negative for nausea and vomiting. Skin: Negative for rash. Neurological: Negative for headaches. PAST MEDICAL HISTORY         Diagnosis Date    COPD (chronic obstructive pulmonary disease) ([address]     GERD (gastroesophageal reflux disease)     Schizoaffective disorder, bipolar type with good prognostic features ([facility] [address]     TIA (transient ischemic attack)        SURGICALHISTORY     Patient  has a past surgical history that includes Tonsillectomy; Tubal ligation; Appendectomy; and Carpal tunnel release (Right, 03/2019).     CURRENT MEDICATIONS       Discharge Medication List as of 8/19/2020  8:35 AM      CONTINUE these medications which have NOT CHANGED    Details   sertraline (ZOLOFT) 50 MG tablet take 1 tablet by mouth once daily, Disp-90 tablet,R-1Normal      mirtazapine (REMERON) 15 MG tablet take 1 tablet by mouth nightly, Disp-30 tablet, R-2Normal      busPIRone (BUSPAR) 15 MG tablet take 1 tablet by mouth twice a day, Disp-60 tablet, R-2Normal      omeprazole (PRILOSEC) 20 MG delayed release capsule take 1 capsule by mouth once daily, Disp-30 capsule, R-3Normal      Multiple Vitamins-Minerals (RA ONE DAILY MAXIMUM) TABS take 1 tablet by mouth once daily, Disp-30 tablet, R-3Normal      RA ASPIRIN ADULT LOW STRENGTH 81 MG chewable tablet chew and swallow 1 tablet by mouth once daily, Disp-365 tablet, R-0Normal      megestrol (MEGACE) 20 MG tablet Take 1 tablet by mouth daily, Disp-90 tablet, R-1Normal      SYMBICORT 160-4.5 MCG/ACT AERO inhale 2 puffs by mouth twice a day, Disp-10.2 g, R-5Normal      albuterol sulfate HFA (VENTOLIN HFA) 108 (90 Base) MCG/ACT inhaler Inhale 2 puffs into the lungs every 4 hours as needed for Wheezing, Disp-1 Inhaler, R-5Normal      naproxen (EC NAPROSYN) 500 MG EC tablet Take 1 tablet by mouth 2 times daily (with meals), Disp-30 tablet, R-0Normal             ALLERGIES     Patient is has No Known Allergies. Patients   Immunization History   Administered Date(s) Administered    Influenza Virus Vaccine 11/06/2017    Influenza, Quadv, 6 mo and older, IM (Fluzone, Flulaval) 09/28/2018    Influenza, Quadv, IM, (6 mo and older Fluzone, Flulaval, Fluarix and 3 yrs and older Afluria) 11/04/2016       FAMILY HISTORY     Patient's family history includes Cirrhosis in her maternal uncle; Dementia in her father; Jeanne Monroe in her mother. SOCIAL HISTORY     Patient  reports that she has been smoking cigarettes. She has a 37.00 pack-year smoking history. She has never used smokeless tobacco. She reports current alcohol use. She reports that she does not use drugs.     PHYSICAL EXAM     ED TRIAGE VITALS  BP: (!) 150/75, Temp: 98 °F (36.7 °C), Pulse: 94, Resp: 16, SpO2: 100 %,Estimated body mass index is 17.68 kg/m² as calculated from the following:    Height as of this encounter: 5' 4\" (1.626 m). Weight as of this encounter: 103 lb (46.7 kg). ,No LMP recorded. Patient is postmenopausal.    Physical Exam  Vitals signs and nursing note reviewed. Constitutional:       General: She is not in acute distress. Appearance: She is well-developed. She is not ill-appearing. HENT:      Head: Normocephalic and atraumatic. Right Ear: Tympanic membrane and ear canal normal.      Left Ear: Tympanic membrane and ear canal normal.      Nose: Congestion present. Right Sinus: No maxillary sinus tenderness or frontal sinus tenderness. Left Sinus: No maxillary sinus tenderness or frontal sinus tenderness. Mouth/Throat:      Lips: Pink. Mouth: Mucous membranes are moist.      Pharynx: Oropharynx is clear. Uvula midline. No pharyngeal swelling or posterior oropharyngeal erythema. Eyes:      General: Lids are normal. No scleral icterus. Conjunctiva/sclera:      Right eye: Right conjunctiva is not injected. Left eye: Left conjunctiva is not injected. Pupils: Pupils are equal.   Cardiovascular:      Rate and Rhythm: Normal rate and regular rhythm. Heart sounds: Normal heart sounds, S1 normal and S2 normal.   Pulmonary:      Effort: Pulmonary effort is normal. No respiratory distress. Breath sounds: Normal breath sounds and air entry. Chest:      Chest wall: Tenderness (Left mid lateral chest and mid axillary line) present. Musculoskeletal:      Comments: Normal active ROM x 4 extremities  Gait steady   Lymphadenopathy:      Comments: No head or neck adenopathy   Skin:     General: Skin is warm and dry. Findings: No rash (to exposed areas of skin). Nails: There is no clubbing. Neurological:      General: No focal deficit present. Mental Status: She is alert and oriented to person, place, and time.       Sensory: No sensory deficit. Comments: Answers questions readily and appropriately   Psychiatric:         Mood and Affect: Mood normal.         Speech: Speech normal.         Behavior: Behavior normal. Behavior is cooperative. DIAGNOSTIC RESULTS     Labs:No results found for this visit on 08/19/20. IMAGING:    No orders to display         EKG:      URGENT CARE COURSE:     Vitals:    08/19/20 0818   BP: (!) 150/75   Pulse: 94   Resp: 16   Temp: 98 °F (36.7 °C)   TempSrc: Temporal   SpO2: 100%   Weight: 103 lb (46.7 kg)   Height: 5' 4\" (1.626 m)       Medications - No data to display         PROCEDURES:  None    FINAL IMPRESSION      1. Acute upper respiratory infection    2. Pleuritic chest pain          DISPOSITION/ PLAN     Patient presents with acute upper respiratory infection as well as pleuritic chest pain. Treat with prednisone and Tessalon Perles. Follow-up in the next week if not improved or if symptoms change or develop fever for possible COVID-19 testing. Work slip provided. Further instructions were outlined verbally and in the patient's discharge instructions. All the patient's questions were answered. The patient/parent agreed with the plan and was discharged from the [facility] in good condition.       PATIENT REFERRED TO:  Axel Abel MD  [facility] [address]      DISCHARGE MEDICATIONS:  Discharge Medication List as of 8/19/2020  8:35 AM      START taking these medications    Details   predniSONE (DELTASONE) 20 MG tablet Take 2 tablets by mouth daily for 5 days, Disp-10 tablet,R-0Normal      benzonatate (TESSALON) 200 MG capsule Take 1 capsule by mouth 3 times daily as needed for Cough, Disp-21 capsule,R-0Normal             Discharge Medication List as of 8/19/2020  8:35 AM          Discharge Medication List as of 8/19/2020  8:35 AM          Michelle Romero APRN - CNP    (Please note that portions of this note were completed with a voice recognition program. Efforts were made to edit the dictations but occasionally words are mis-transcribed.)         Norma Romero, APRN - CNP  08/19/20 0988

## 2020-08-20 ENCOUNTER — CARE COORDINATION (OUTPATIENT)
Dept: CARE COORDINATION | Age: 55
End: 2020-08-20

## 2020-08-20 RX ORDER — MEGESTROL ACETATE 20 MG/1
20 TABLET ORAL DAILY
Qty: 90 TABLET | Refills: 1 | Status: SHIPPED | OUTPATIENT
Start: 2020-08-20 | End: 2021-02-01 | Stop reason: SDUPTHER

## 2020-08-20 NOTE — CARE COORDINATION
Attempted outreach for [id number] monitoring. Left message to return phone call to ambulatory care manager at [phone]. Patient contacted regarding Sherree Fabry. Discussed COVID-19 related testing which was not done at this time. Test results were not done. Patient informed of results, if available? No    Care Transition Nurse/ Ambulatory Care Manager contacted the patient by telephone to perform post discharge assessment. Verified name and  with patient as identifiers. Provided introduction to self, and explanation of the CTN/ACM role, and reason for call due to risk factors for infection and/or exposure to COVID-19. Symptoms reviewed with patient who verbalized the following symptoms: cough and shortness of breath. Due to no new or worsening symptoms encounter was not routed to provider for escalation. Discussed follow-up appointments. If no appointment was previously scheduled, appointment scheduling offered: yes  [facility] follow up appointment(s):   Future Appointments   Date Time Provider Alley Olivares   2020 10:00 AM Irena Runner, MD [facility]     [facility] follow up appointment(s): NA     Advance Care Planning:   Does patient have an Advance Directive:  reviewed and current. Patient has following risk factors of: COPD. CTN/ACM reviewed discharge instructions, medical action plan and red flags such as increased shortness of breath, increasing fever and signs of decompensation with patient who verbalized understanding. Discussed exposure protocols and quarantine with CDC Guidelines What to do if you are sick with coronavirus disease .  Patient was given an opportunity for questions and concerns. The patient agrees to contact the [employer] exposure line [phone], local [facility] department [place] Department [facility]: ([phone]) and PCP office for questions related to their healthcare. CTN/ACM provided contact information for future needs.     Reviewed and educated patient on any new and changed medications related to discharge diagnosis     Patient/family/caregiver given information for [employer] and agrees to enroll yes  Patient's preferred e-mail: [email or web address]   Patient's preferred phone number: [phone]  Based on Loop alert triggers, patient will be contacted by nurse care manager for worsening symptoms. worse  Pt will be further monitored by COVID Loop Team based on severity of symptoms and risk factors. Advised to report new or worsening symptoms. Discussed symptom monitoring and management. Taking prednisone and benzonatate. Scheduled ED follow up with PCP. Patient worried about her weight. Under a lot of stress. Wants to have weight checked at office. Advised to call office before showing up.

## 2020-08-28 ENCOUNTER — OFFICE VISIT (OUTPATIENT)
Dept: FAMILY MEDICINE CLINIC | Age: 55
End: 2020-08-28
Payer: MEDICARE

## 2020-08-28 VITALS
TEMPERATURE: 98 F | BODY MASS INDEX: 17.07 KG/M2 | DIASTOLIC BLOOD PRESSURE: 70 MMHG | SYSTOLIC BLOOD PRESSURE: 102 MMHG | HEIGHT: 64 IN | WEIGHT: 100 LBS

## 2020-08-28 PROBLEM — E46 MALNUTRITION, CALORIE (HCC): Status: ACTIVE | Noted: 2020-08-28

## 2020-08-28 PROCEDURE — G8419 CALC BMI OUT NRM PARAM NOF/U: HCPCS | Performed by: FAMILY MEDICINE

## 2020-08-28 PROCEDURE — 99214 OFFICE O/P EST MOD 30 MIN: CPT | Performed by: FAMILY MEDICINE

## 2020-08-28 PROCEDURE — 4004F PT TOBACCO SCREEN RCVD TLK: CPT | Performed by: FAMILY MEDICINE

## 2020-08-28 PROCEDURE — 3017F COLORECTAL CA SCREEN DOC REV: CPT | Performed by: FAMILY MEDICINE

## 2020-08-28 PROCEDURE — G8427 DOCREV CUR MEDS BY ELIG CLIN: HCPCS | Performed by: FAMILY MEDICINE

## 2020-08-28 RX ORDER — SERTRALINE HYDROCHLORIDE 100 MG/1
TABLET, FILM COATED ORAL
Qty: 90 TABLET | Refills: 0 | Status: SHIPPED | OUTPATIENT
Start: 2020-08-28 | End: 2021-01-04 | Stop reason: SDUPTHER

## 2020-08-28 RX ORDER — BUSPIRONE HYDROCHLORIDE 30 MG/1
TABLET ORAL
Qty: 180 TABLET | Refills: 0 | Status: SHIPPED | OUTPATIENT
Start: 2020-08-28 | End: 2021-01-04

## 2020-08-28 ASSESSMENT — ENCOUNTER SYMPTOMS
COUGH: 0
NAUSEA: 0
RHINORRHEA: 0
EYE REDNESS: 0
SHORTNESS OF BREATH: 0
EYE DISCHARGE: 0
VOMITING: 0

## 2020-08-28 NOTE — PROGRESS NOTES
[address] [place] [address] [address], [address], [place]  Phone:  [phone]  LRP:[phone]       Name: Mercy Thomas  : 1965    Chief Complaint   Patient presents with    ED Follow-up     feeling much better     having increase in stress           HPI:     Mercy Thomas is a 49 y.o. female who presents today for follow-up of anxiety, alcohol abuse, and malnutrition. She recently got settled into a 2 bedroom apartment and her daughter lost her job because of meth and is homeless with 4 kids (ages 1 months through 10 yo) so moved in. She feels like she's going crazy with them there. The kids don't listen and destroy her place. Paulina Andrew recently lost her job so she's there constantly with the kids and finds herself very irritable. She's drinking more alcohol as a result. She didn't drink last night and wants to get sober as her  Abdoul was an alcoholic and she doesn't want to be. Denise isn't eating much because of the stress. She has thoughts she'd be better off dead but has no active plan and doesn't feel she needs admitted to psychiatry.       Current Outpatient Medications:     sertraline (ZOLOFT) 100 MG tablet, Take 1 tablet by mouth once daily, Disp: 90 tablet, Rfl: 0    busPIRone (BUSPAR) 30 MG tablet, Take 1 tablet by mouth twice a day, Disp: 180 tablet, Rfl: 0    megestrol (MEGACE) 20 MG tablet, TAKE 1 TABLET BY MOUTH DAILY, Disp: 90 tablet, Rfl: 1    mirtazapine (REMERON) 15 MG tablet, take 1 tablet by mouth nightly, Disp: 30 tablet, Rfl: 2    omeprazole (PRILOSEC) 20 MG delayed release capsule, take 1 capsule by mouth once daily, Disp: 30 capsule, Rfl: 3    Multiple Vitamins-Minerals (RA ONE DAILY MAXIMUM) TABS, take 1 tablet by mouth once daily, Disp: 30 tablet, Rfl: 3    RA ASPIRIN ADULT LOW STRENGTH 81 MG chewable tablet, chew and swallow 1 tablet by mouth once daily, Disp: 365 tablet, Rfl: 0    naproxen (EC NAPROSYN) 500 MG EC tablet, Take 1 tablet by mouth 2 times daily (with meals), Disp: 30 tablet, Rfl: 0    SYMBICORT 160-4.5 MCG/ACT AERO, inhale 2 puffs by mouth twice a day, Disp: 10.2 g, Rfl: 5    albuterol sulfate HFA (VENTOLIN HFA) 108 (90 Base) MCG/ACT inhaler, Inhale 2 puffs into the lungs every 4 hours as needed for Wheezing, Disp: 1 Inhaler, Rfl: 5    No Known Allergies    Subjective:      Review of Systems   Constitutional: Negative for chills and fever. HENT: Negative for congestion and rhinorrhea. Eyes: Negative for discharge and redness. Respiratory: Negative for cough and shortness of breath. Cardiovascular: Negative for chest pain and palpitations. Gastrointestinal: Negative for nausea and vomiting. Genitourinary: Negative for dysuria and frequency. Musculoskeletal: Negative for arthralgias and myalgias. Neurological: Negative for weakness and headaches. Psychiatric/Behavioral: Positive for agitation, dysphoric mood, sleep disturbance and suicidal ideas. Negative for self-injury. The patient is nervous/anxious. Objective:     /70 (Site: Left Upper Arm, Position: Sitting, Cuff Size: Small Adult)   Temp 98 °F (36.7 °C)   Ht 5' 4\" (1.626 m)   Wt 100 lb (45.4 kg)   BMI 17.16 kg/m²     Physical Exam  Vitals signs and nursing note reviewed. Constitutional:       General: She is not in acute distress. Appearance: She is well-developed. HENT:      Head: Normocephalic and atraumatic. Nose: Nose normal.   Eyes:      Conjunctiva/sclera: Conjunctivae normal.   Neck:      Musculoskeletal: Normal range of motion and neck supple. Cardiovascular:      Rate and Rhythm: Normal rate and regular rhythm. Heart sounds: Normal heart sounds. Pulmonary:      Effort: Pulmonary effort is normal. No respiratory distress. Breath sounds: Normal breath sounds. No wheezing. Abdominal:      General: Bowel sounds are normal. There is no distension. Palpations: Abdomen is soft. Tenderness:  There is no abdominal tenderness. Skin:     General: Skin is warm and dry. Neurological:      Mental Status: She is alert and oriented to person, place, and time. Psychiatric:         Mood and Affect: Mood is anxious. Affect is angry. Behavior: Behavior normal.       Assessment/Plan:     Jack Collins was seen today for ed follow-up. Diagnoses and all orders for this visit:    Anxiety and depression  -     She is struggling with increased anxiety and depression so will increase her medication dosages and refer to [facility] for further evaluation. She was advised to contact our office with worsening mood or medication side effects.  -     [facility], [address]  -     sertraline (ZOLOFT) 100 MG tablet; Take 1 tablet by mouth once daily  -     busPIRone (BUSPAR) 30 MG tablet; Take 1 tablet by mouth twice a day    Alcohol abuse        -      She is working on cutting down on her alcohol intake. Her  is a recovering alcoholic and she knows what it's like to have to deal with that and wants to get better. Malnutrition, calorie ([address]        -     She is eating less with her increased stress level and states that she's not hungry. She was provided with a bag of Ensure bottles from our office. Return in about 2 months (around 10/28/2020) for anxiety, depression.     Electronically signed by Rachell Corado MD on 8/28/2020 at 10:17 AM

## 2020-09-08 RX ORDER — OMEPRAZOLE 20 MG/1
CAPSULE, DELAYED RELEASE ORAL
Qty: 30 CAPSULE | Refills: 3 | Status: SHIPPED | OUTPATIENT
Start: 2020-09-08 | End: 2021-01-20

## 2020-09-28 RX ORDER — MIRTAZAPINE 15 MG/1
15 TABLET, FILM COATED ORAL NIGHTLY
Qty: 30 TABLET | Refills: 2 | Status: SHIPPED | OUTPATIENT
Start: 2020-09-28 | End: 2021-04-19 | Stop reason: SDUPTHER

## 2020-10-22 ENCOUNTER — TELEPHONE (OUTPATIENT)
Dept: FAMILY MEDICINE CLINIC | Age: 55
End: 2020-10-22

## 2020-10-22 ENCOUNTER — HOSPITAL ENCOUNTER (OUTPATIENT)
Age: 55
Discharge: HOME | End: 2020-10-22
Payer: MEDICARE

## 2020-10-22 ENCOUNTER — VIRTUAL VISIT (OUTPATIENT)
Dept: FAMILY MEDICINE CLINIC | Age: 55
End: 2020-10-22
Payer: MEDICARE

## 2020-10-22 ENCOUNTER — NURSE TRIAGE (OUTPATIENT)
Dept: OTHER | Facility: CLINIC | Age: 55
End: 2020-10-22

## 2020-10-22 DIAGNOSIS — R05.9 COUGH: ICD-10-CM

## 2020-10-22 PROCEDURE — U0003 INFECTIOUS AGENT DETECTION BY NUCLEIC ACID (DNA OR RNA); SEVERE ACUTE RESPIRATORY SYNDROME CORONAVIRUS 2 (SARS-COV-2) (CORONAVIRUS DISEASE [COVID-19]), AMPLIFIED PROBE TECHNIQUE, MAKING USE OF HIGH THROUGHPUT TECHNOLOGIES AS DESCRIBED BY CMS-2020-01-R: HCPCS

## 2020-10-22 PROCEDURE — G8427 DOCREV CUR MEDS BY ELIG CLIN: HCPCS | Performed by: FAMILY MEDICINE

## 2020-10-22 PROCEDURE — G8419 CALC BMI OUT NRM PARAM NOF/U: HCPCS | Performed by: FAMILY MEDICINE

## 2020-10-22 PROCEDURE — 99211 OFF/OP EST MAY X REQ PHY/QHP: CPT

## 2020-10-22 PROCEDURE — 4004F PT TOBACCO SCREEN RCVD TLK: CPT | Performed by: FAMILY MEDICINE

## 2020-10-22 PROCEDURE — 99214 OFFICE O/P EST MOD 30 MIN: CPT | Performed by: FAMILY MEDICINE

## 2020-10-22 PROCEDURE — 3017F COLORECTAL CA SCREEN DOC REV: CPT | Performed by: FAMILY MEDICINE

## 2020-10-22 PROCEDURE — G8484 FLU IMMUNIZE NO ADMIN: HCPCS | Performed by: FAMILY MEDICINE

## 2020-10-22 RX ORDER — PREDNISONE 20 MG/1
40 TABLET ORAL DAILY
Qty: 10 TABLET | Refills: 0 | Status: SHIPPED | OUTPATIENT
Start: 2020-10-22 | End: 2021-01-04

## 2020-10-22 RX ORDER — BENZONATATE 100 MG/1
100 CAPSULE ORAL 3 TIMES DAILY PRN
Qty: 30 CAPSULE | Refills: 0 | Status: SHIPPED | OUTPATIENT
Start: 2020-10-22 | End: 2020-10-29

## 2020-10-22 RX ORDER — AZITHROMYCIN 250 MG/1
TABLET, FILM COATED ORAL
Qty: 6 TABLET | Refills: 0 | Status: SHIPPED | OUTPATIENT
Start: 2020-10-22 | End: 2021-01-04

## 2020-10-22 ASSESSMENT — ENCOUNTER SYMPTOMS
RHINORRHEA: 0
WHEEZING: 1
COLOR CHANGE: 0
EYE REDNESS: 0
NAUSEA: 0
SHORTNESS OF BREATH: 1
CHEST TIGHTNESS: 1
VOMITING: 0
COUGH: 1

## 2020-10-22 NOTE — TELEPHONE ENCOUNTER
Reason for Disposition   Wheezing is present    Answer Assessment - Initial Assessment Questions  1. ONSET: \"When did the cough begin? \"       Last week she states    2. SEVERITY: \"How bad is the cough today? \"   Severe    3. RESPIRATORY DISTRESS: \"Describe your breathing. \"    sob    4. FEVER: \"Do you have a fever? \" If so, ask: \"What is your temperature, how was it measured, and when did it start? \"  States has not taken temperature    5. SPUTUM: \"Describe the color of your sputum\" (clear, white, yellow, green)  Green    6. HEMOPTYSIS: \"Are you coughing up any blood? \" If so ask: \"How much? \" (flecks, streaks, tablespoons, etc.)  No    7. CARDIAC HISTORY: \"Do you have any history of heart disease? \" (e.g., heart attack, congestive heart failure)   No    8. LUNG HISTORY: \"Do you have any history of lung disease? \"  (e.g., pulmonary embolus, asthma, emphysema)  Copd, emphysema    9. PE RISK FACTORS: \"Do you have a history of blood clots? \" (or: recent major surgery, recent prolonged travel, bedridden)  No    10. OTHER SYMPTOMS: \"Do you have any other symptoms? \" (e.g., runny nose, wheezing, chest pain)  Runny nose,wheezing, chest pain states has left sided pleurisy    12. TRAVEL: \"Have you traveled out of the country in the last month? \" (e.g., travel history, exposures)     No    Protocols used: COUGH - ACUTE PRODUCTIVE-ADULT-AH    Call transferred to [facility]      Attention Provider: Thank you for allowing me to participate in the care of your patient. The patient was connected to triage in response to information provided to the [facility]. Please do not respond through this encounter as the response is not directed to a shared pool.

## 2020-10-22 NOTE — PROGRESS NOTES
Outpatient oropharyngeal covid swab obtained without difficulty. Pt tolerated well and released without complaints.
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
## 2020-10-22 NOTE — TELEPHONE ENCOUNTER
Patient was transferred from RN triage for having a cough for 1 week and she has COPD. RN triage stated patient needs seen within 4 hours.  Please call patient to schedule

## 2020-10-24 LAB — SARS-COV-2: DETECTED

## 2020-10-28 ENCOUNTER — TELEPHONE (OUTPATIENT)
Dept: FAMILY MEDICINE CLINIC | Age: 55
End: 2020-10-28

## 2020-10-28 NOTE — TELEPHONE ENCOUNTER
Patient is calling to see if there is anything that can be prescribed or recommended for her to help her with her cough. She said that she tested positive for COVID19, but prior to that she was on an antibiotic and steroid. She said she is having an unproductive cough and cant seem to get it out of her chest and doesn't know what to do.   Please advise  [phone]  [facility] in [place]

## 2020-10-28 NOTE — TELEPHONE ENCOUNTER
Michele Woods advise, she verbalized understanding and said smoking doesn't even taste good right now.

## 2020-11-06 ENCOUNTER — CLINICAL DOCUMENTATION (OUTPATIENT)
Dept: FAMILY MEDICINE CLINIC | Age: 55
End: 2020-11-06
Payer: MEDICARE

## 2020-11-06 LAB
CONTROL: PRESENT
HEMOCCULT STL QL: POSITIVE

## 2020-11-06 PROCEDURE — 82274 ASSAY TEST FOR BLOOD FECAL: CPT | Performed by: FAMILY MEDICINE

## 2020-11-06 NOTE — PROGRESS NOTES
[facility] 165 returned FIT sample. Result positive. Lissa Berg MD  notified of result.

## 2020-11-30 RX ORDER — ACETAMINOPHEN 160 MG
TABLET,CHEWABLE ORAL
Qty: 365 TABLET | Refills: 0 | Status: SHIPPED | OUTPATIENT
Start: 2020-11-30 | End: 2021-10-05 | Stop reason: SDUPTHER

## 2020-12-21 RX ORDER — BUSPIRONE HYDROCHLORIDE 15 MG/1
TABLET ORAL
Qty: 60 TABLET | Refills: 2 | Status: SHIPPED | OUTPATIENT
Start: 2020-12-21 | End: 2021-01-04 | Stop reason: ALTCHOICE

## 2020-12-21 NOTE — TELEPHONE ENCOUNTER
Simran Don called requesting a refill on the following medications:  Requested Prescriptions     Pending Prescriptions Disp Refills    busPIRone (BUSPAR) 15 MG tablet [Pharmacy Med Name: BUSPIRONE HCL 15 MG TABLET] 60 tablet 2     Sig: take 1 tablet by mouth twice a day       Date of last visit: 10/22/2020  Date of next visit (if applicable):Visit date not found  Date of last refill: 08/28/2020  Pharmacy Name: [facility]      Thanks,  Terrence Merlin, LPN

## 2020-12-29 RX ORDER — MIRTAZAPINE 15 MG/1
TABLET, FILM COATED ORAL
Qty: 30 TABLET | Refills: 2 | Status: SHIPPED | OUTPATIENT
Start: 2020-12-29 | End: 2021-01-20 | Stop reason: ALTCHOICE

## 2020-12-29 NOTE — TELEPHONE ENCOUNTER
[address] [place] [address] called requesting a refill on the following medications:  Requested Prescriptions     Pending Prescriptions Disp Refills    mirtazapine (REMERON) 15 MG tablet [Pharmacy Med Name: MIRTAZAPINE 15 MG TABLET] 30 tablet 2     Sig: take 1 tablet by mouth at bedtime       Date of last visit: 10/22/2020  Date of next visit (if applicable):Visit date not found  Date of last refill:   Pharmacy Name:       Daniela Forman, [address] [place] [address]

## 2020-12-30 ENCOUNTER — TELEPHONE (OUTPATIENT)
Dept: FAMILY MEDICINE CLINIC | Age: 55
End: 2020-12-30

## 2020-12-30 NOTE — TELEPHONE ENCOUNTER
Referral Request:    Patient called today to request a referral toGastroenterologist for Colonoscopy.  Patient states she is unable to go back to the last referral because she missed appointments due to work  Referring Provider: Dr Herbert Pascual  Phone number where patient can be reached between 8 AM and 6 PM is [phone]

## 2021-01-02 ASSESSMENT — ENCOUNTER SYMPTOMS
COUGH: 0
EYE DISCHARGE: 0
VOMITING: 0
NAUSEA: 0
RHINORRHEA: 0
EYE REDNESS: 0
SHORTNESS OF BREATH: 0
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
## 2021-01-04 ENCOUNTER — OFFICE VISIT (OUTPATIENT)
Dept: FAMILY MEDICINE CLINIC | Age: 56
End: 2021-01-04
Payer: MEDICARE

## 2021-01-04 VITALS
WEIGHT: 106 LBS | DIASTOLIC BLOOD PRESSURE: 86 MMHG | BODY MASS INDEX: 18.1 KG/M2 | SYSTOLIC BLOOD PRESSURE: 124 MMHG | HEIGHT: 64 IN | TEMPERATURE: 98.2 F

## 2021-01-04 DIAGNOSIS — N39.41 URGE INCONTINENCE: ICD-10-CM

## 2021-01-04 DIAGNOSIS — F41.9 ANXIETY AND DEPRESSION: Primary | ICD-10-CM

## 2021-01-04 DIAGNOSIS — F32.A ANXIETY AND DEPRESSION: Primary | ICD-10-CM

## 2021-01-04 DIAGNOSIS — E46 MALNUTRITION, CALORIE (HCC): ICD-10-CM

## 2021-01-04 DIAGNOSIS — F10.10 ALCOHOL ABUSE: ICD-10-CM

## 2021-01-04 PROCEDURE — 3017F COLORECTAL CA SCREEN DOC REV: CPT | Performed by: FAMILY MEDICINE

## 2021-01-04 PROCEDURE — 4004F PT TOBACCO SCREEN RCVD TLK: CPT | Performed by: FAMILY MEDICINE

## 2021-01-04 PROCEDURE — G8484 FLU IMMUNIZE NO ADMIN: HCPCS | Performed by: FAMILY MEDICINE

## 2021-01-04 PROCEDURE — G8427 DOCREV CUR MEDS BY ELIG CLIN: HCPCS | Performed by: FAMILY MEDICINE

## 2021-01-04 PROCEDURE — 99214 OFFICE O/P EST MOD 30 MIN: CPT | Performed by: FAMILY MEDICINE

## 2021-01-04 PROCEDURE — G8419 CALC BMI OUT NRM PARAM NOF/U: HCPCS | Performed by: FAMILY MEDICINE

## 2021-01-04 RX ORDER — OXYBUTYNIN CHLORIDE 5 MG/1
5 TABLET, EXTENDED RELEASE ORAL DAILY
Qty: 90 TABLET | Refills: 1 | Status: SHIPPED | OUTPATIENT
Start: 2021-01-04 | End: 2021-10-05 | Stop reason: SDUPTHER

## 2021-01-04 RX ORDER — LORAZEPAM 0.5 MG/1
0.5 TABLET ORAL EVERY 8 HOURS PRN
Qty: 90 TABLET | Refills: 0 | Status: SHIPPED | OUTPATIENT
Start: 2021-01-04 | End: 2021-02-03

## 2021-01-04 RX ORDER — SERTRALINE HYDROCHLORIDE 100 MG/1
TABLET, FILM COATED ORAL
Qty: 90 TABLET | Refills: 0 | Status: SHIPPED | OUTPATIENT
Start: 2021-01-04 | End: 2021-04-19 | Stop reason: SDUPTHER

## 2021-01-31 ASSESSMENT — ENCOUNTER SYMPTOMS
VOMITING: 0
NAUSEA: 0
COUGH: 0
SHORTNESS OF BREATH: 0
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
## 2021-02-01 ENCOUNTER — OFFICE VISIT (OUTPATIENT)
Dept: FAMILY MEDICINE CLINIC | Age: 56
End: 2021-02-01
Payer: MEDICARE

## 2021-02-01 VITALS
HEIGHT: 64 IN | SYSTOLIC BLOOD PRESSURE: 120 MMHG | WEIGHT: 110 LBS | BODY MASS INDEX: 18.78 KG/M2 | DIASTOLIC BLOOD PRESSURE: 80 MMHG

## 2021-02-01 DIAGNOSIS — F10.10 ALCOHOL ABUSE: ICD-10-CM

## 2021-02-01 DIAGNOSIS — F32.A ANXIETY AND DEPRESSION: Primary | ICD-10-CM

## 2021-02-01 DIAGNOSIS — B86 SCABIES: ICD-10-CM

## 2021-02-01 DIAGNOSIS — Z12.31 ENCOUNTER FOR SCREENING MAMMOGRAM FOR BREAST CANCER: ICD-10-CM

## 2021-02-01 DIAGNOSIS — F41.9 ANXIETY AND DEPRESSION: Primary | ICD-10-CM

## 2021-02-01 DIAGNOSIS — E46 MALNUTRITION, CALORIE (HCC): ICD-10-CM

## 2021-02-01 PROCEDURE — G8484 FLU IMMUNIZE NO ADMIN: HCPCS | Performed by: FAMILY MEDICINE

## 2021-02-01 PROCEDURE — 3017F COLORECTAL CA SCREEN DOC REV: CPT | Performed by: FAMILY MEDICINE

## 2021-02-01 PROCEDURE — 99214 OFFICE O/P EST MOD 30 MIN: CPT | Performed by: FAMILY MEDICINE

## 2021-02-01 PROCEDURE — 4004F PT TOBACCO SCREEN RCVD TLK: CPT | Performed by: FAMILY MEDICINE

## 2021-02-01 PROCEDURE — G8427 DOCREV CUR MEDS BY ELIG CLIN: HCPCS | Performed by: FAMILY MEDICINE

## 2021-02-01 PROCEDURE — G8420 CALC BMI NORM PARAMETERS: HCPCS | Performed by: FAMILY MEDICINE

## 2021-02-01 RX ORDER — MEGESTROL ACETATE 40 MG/1
40 TABLET ORAL DAILY
Qty: 90 TABLET | Refills: 1 | Status: SHIPPED | OUTPATIENT
Start: 2021-02-01 | End: 2021-05-18

## 2021-02-01 RX ORDER — PERMETHRIN 50 MG/G
CREAM TOPICAL
Qty: 1 TUBE | Refills: 0 | Status: SHIPPED | OUTPATIENT
Start: 2021-02-01 | End: 2021-02-18 | Stop reason: SDUPTHER

## 2021-02-08 ENCOUNTER — HOSPITAL ENCOUNTER (EMERGENCY)
Age: 56
Discharge: HOME | End: 2021-02-08
Payer: MEDICARE

## 2021-02-08 VITALS
BODY MASS INDEX: 18.78 KG/M2 | WEIGHT: 110 LBS | TEMPERATURE: 98 F | HEIGHT: 64 IN | RESPIRATION RATE: 18 BRPM | OXYGEN SATURATION: 99 % | DIASTOLIC BLOOD PRESSURE: 80 MMHG | HEART RATE: 80 BPM | SYSTOLIC BLOOD PRESSURE: 128 MMHG

## 2021-02-08 DIAGNOSIS — B86 SCABIES: Primary | ICD-10-CM

## 2021-02-08 DIAGNOSIS — Z78.9 FAILURE OF OUTPATIENT TREATMENT: ICD-10-CM

## 2021-02-08 PROCEDURE — 99213 OFFICE O/P EST LOW 20 MIN: CPT | Performed by: NURSE PRACTITIONER

## 2021-02-08 PROCEDURE — 99213 OFFICE O/P EST LOW 20 MIN: CPT

## 2021-02-08 RX ORDER — IVERMECTIN 3 MG/1
200 TABLET ORAL ONCE
Qty: 4 TABLET | Refills: 1 | Status: SHIPPED | OUTPATIENT
Start: 2021-02-08 | End: 2021-02-16 | Stop reason: SDUPTHER

## 2021-02-08 RX ORDER — LORAZEPAM 0.5 MG/1
0.5 TABLET ORAL EVERY 6 HOURS PRN
COMMUNITY
End: 2021-04-19 | Stop reason: SDUPTHER

## 2021-02-08 ASSESSMENT — ENCOUNTER SYMPTOMS
VOMITING: 0
SHORTNESS OF BREATH: 0
NAUSEA: 0

## 2021-02-08 NOTE — ED PROVIDER NOTES
[address]  Urgent Care Encounter       CHIEF COMPLAINT       Chief Complaint   Patient presents with    Rash       Nurses Notes reviewed and I agree except as noted in the HPI. HISTORY OF PRESENT ILLNESS   Janelle Andujar is a 48 y.o. female who presents with complaints of a rash with itching that started a week and a half ago on her back and has spread to her chest and abdomen. She saw Dr. Christianne Boss on Feb 1st and was given a prescription for permethrin cream for scabies. She has been using this twice daily and reports the rash continues to spread. No one else in the house has a rash. She has washed all of her clothes, linens, and towels in hot water. She has not taken anything for the itching. The history is provided by the patient. REVIEW OF SYSTEMS     Review of Systems   Constitutional: Negative for fever. Respiratory: Negative for shortness of breath. Cardiovascular: Negative for chest pain. Gastrointestinal: Negative for nausea and vomiting. Skin: Negative for rash (chest, abdomen, back) and wound. Neurological: Negative for numbness. PAST MEDICAL HISTORY         Diagnosis Date    COPD (chronic obstructive pulmonary disease) ([address]     GERD (gastroesophageal reflux disease)     Schizoaffective disorder, bipolar type with good prognostic features ([address]     TIA (transient ischemic attack)        SURGICALHISTORY     Patient  has a past surgical history that includes Tonsillectomy; Tubal ligation; Appendectomy; and Carpal tunnel release (Right, 03/2019). CURRENT MEDICATIONS       Discharge Medication List as of 2/8/2021 11:26 AM      CONTINUE these medications which have NOT CHANGED    Details   LORazepam (ATIVAN) 0.5 MG tablet Take 0.5 mg by mouth every 6 hours as needed for Anxiety. Historical Med      megestrol (MEGACE) 40 MG tablet Take 1 tablet by mouth daily, Disp-90 tablet, R-1Normal      permethrin (ELIMITE) 5 % cream Apply topically as directed, Disp-1 Tube, R-0, Normal      omeprazole (PRILOSEC) 40 MG delayed release capsule Take 1 capsule by mouth every morning (before breakfast), Disp-30 capsule, R-3Normal      !! sertraline (ZOLOFT) 100 MG tablet Take 100 mg tablet with 50 mg tablet for 150 mg daily. , Disp-90 tablet, R-0Normal      !! sertraline (ZOLOFT) 50 MG tablet Take 50 mg tablet with 50 mg tablet for 150 mg daily. , Disp-90 tablet, R-0Normal      oxybutynin (DITROPAN XL) 5 MG extended release tablet Take 1 tablet by mouth daily, Disp-90 tablet, R-1Normal      RA ASPIRIN ADULT LOW STRENGTH 81 MG chewable tablet chew and swallow 1 tablet by mouth once daily, Disp-365 tablet, R-0Normal      Multiple Vitamins-Minerals (RA ONE DAILY MAXIMUM) TABS take 1 tablet by mouth once daily, Disp-30 tablet, R-3Normal      SYMBICORT 160-4.5 MCG/ACT AERO inhale 2 puffs by mouth twice a day, Disp-10.2 g, R-5Normal      polyethylene glycol (GLYCOLAX) 17 GM/SCOOP powder Dispense 238 Gram Bottle. Use as Directed, Disp-238 g, R-0Normal      albuterol sulfate HFA (VENTOLIN HFA) 108 (90 Base) MCG/ACT inhaler Inhale 2 puffs into the lungs every 4 hours as needed for Wheezing, Disp-1 Inhaler, R-5Normal       !! - Potential duplicate medications found. Please discuss with provider. ALLERGIES     Patient is has No Known Allergies. Patients   Immunization History   Administered Date(s) Administered    Influenza Virus Vaccine 11/06/2017    Influenza, Quadv, 6 mo and older, IM (Fluzone, Flulaval) 09/28/2018    Influenza, Quadv, IM, (6 mo and older Fluzone, Flulaval, Fluarix and 3 yrs and older Afluria) 11/04/2016       FAMILY HISTORY     Patient's family history includes Brain Cancer in her mother; Cirrhosis in her maternal uncle; Dementia in her father; Jose Fudge in her mother. SOCIAL HISTORY     Patient  reports that she has been smoking cigarettes. She has a 37.00 pack-year smoking history. She has never used smokeless tobacco. She reports current alcohol use. She reports that she does not use drugs. PHYSICAL EXAM     ED TRIAGE VITALS  BP: 128/80, Temp: 98 °F (36.7 °C), Pulse: 80, Resp: 18, SpO2: 99 %,Estimated body mass index is 18.88 kg/m² as calculated from the following:    Height as of this encounter: 5' 4\" (1.626 m). Weight as of this encounter: 110 lb (49.9 kg). ,No LMP recorded. Patient is postmenopausal.    Physical Exam  Vitals signs and nursing note reviewed. Constitutional:       General: She is not in acute distress. Appearance: She is well-developed. HENT:      Head: Normocephalic and atraumatic. Pulmonary:      Effort: Pulmonary effort is normal. No respiratory distress. Skin:     General: Skin is warm and dry. Findings: Rash (trunk and back. small area to right antecubital space.) present. Rash is papular and pustular (few). Neurological:      General: No focal deficit present. Mental Status: She is alert and oriented to person, place, and time. Psychiatric:         Mood and Affect: Mood normal.         Speech: Speech normal.         Behavior: Behavior normal. Behavior is cooperative. DIAGNOSTIC RESULTS     Labs:No results found for this visit on 02/08/21. IMAGING:    No orders to display         EKG:      URGENT CARE COURSE:     Vitals:    02/08/21 1058   BP: 128/80   Pulse: 80   Resp: 18   Temp: 98 °F (36.7 °C)   TempSrc: Temporal   SpO2: 99%   Weight: 110 lb (49.9 kg)   Height: 5' 4\" (1.626 m)       Medications - No data to display         PROCEDURES:  None    FINAL IMPRESSION      1. Scabies    2. Failure of outpatient treatment          DISPOSITION/ PLAN     Patient presents with a pruritic rash consistent with scabies. She has treated with permethrin cream multiple times but rash is spreading. She has washed her sheets, clothes and towels and bagged other items. She has not treated her furniture. Due to the ineffectiveness of the permethrin, I will be stopped.   Patient will be treated with ivermectin orally. Benadryl as needed for itching. Discussed importance of retreating the home environment. Follow-up family doctor in 1 week if not improved. Further instructions were outlined verbally and in the patient's discharge instructions. All the patient's questions were answered. The patient/parent agreed with the plan and was discharged from the [facility] in good condition.       PATIENT REFERRED TO:  Sophie Foster MD  [facility] / [address] [place] [id number]      DISCHARGE MEDICATIONS:  Discharge Medication List as of 2/8/2021 11:26 AM      START taking these medications    Details   ivermectin 3 MG tablet Take 3.5 tablets by mouth once for 1 dose Repeat dose in one week, Disp-4 tablet, R-1Normal             Discharge Medication List as of 2/8/2021 11:26 AM          Discharge Medication List as of 2/8/2021 11:26 AM          KEITH Valiente CNP    (Please note that portions of this note were completed with a voice recognition program. Efforts were made to edit the dictations but occasionally words are mis-transcribed.)         KEITH Valiente CNP  02/08/21 4857

## 2021-02-08 NOTE — ED NOTES
Discharge instructions and prescriptions reviewed with pt. Pt verbalized understanding. Pt ambulated out in stable condition. Assessment unchanged upon discharge.      Baron Rick RN  02/08/21 2009

## 2021-02-08 NOTE — ED TRIAGE NOTES
To room with c/o rash \"all over \" for  1 1/2 weeks. She was seen my PCP last Monday and dx with Scabies. Symptoms are not getting better with tx. \"I have washed everything in my house.  \"

## 2021-02-15 ENCOUNTER — TELEPHONE (OUTPATIENT)
Dept: FAMILY MEDICINE CLINIC | Age: 56
End: 2021-02-15

## 2021-02-15 NOTE — TELEPHONE ENCOUNTER
Patient states she is using the cream and leaving it on for the 10 hours, patient also states the  has not been treated but has no rash or itchiness    Patient states it burns so bad that she is having to wear very loose clothing so it does not hurt    Patient stated the medication that was given to her in the Urgent Care helped when she took it

## 2021-02-15 NOTE — TELEPHONE ENCOUNTER
Is she leaving the permethrin cream on for at least 10 hours as recommended? Have all of her household contacts been treated?

## 2021-02-16 RX ORDER — IVERMECTIN 3 MG/1
200 TABLET ORAL ONCE
Qty: 4 TABLET | Refills: 1 | Status: SHIPPED | OUTPATIENT
Start: 2021-02-16 | End: 2021-02-16

## 2021-02-18 ENCOUNTER — TELEPHONE (OUTPATIENT)
Dept: FAMILY MEDICINE CLINIC | Age: 56
End: 2021-02-18

## 2021-02-18 DIAGNOSIS — B86 SCABIES: Primary | ICD-10-CM

## 2021-02-18 RX ORDER — PERMETHRIN 50 MG/G
CREAM TOPICAL
Qty: 1 TUBE | Refills: 0 | Status: SHIPPED | OUTPATIENT
Start: 2021-02-18 | End: 2021-11-23

## 2021-02-18 RX ORDER — IVERMECTIN 3 MG/1
200 TABLET ORAL ONCE
Qty: 4 TABLET | Refills: 1 | Status: SHIPPED | OUTPATIENT
Start: 2021-02-18 | End: 2021-02-18

## 2021-02-18 NOTE — TELEPHONE ENCOUNTER
Patient called she is  Very uncomfortable with the itching. She stated that what ever this rash/ scabies is it just keeps spreading. Her   now has this too. She stated this is worse than on Monday. She is requesting medication to help. Stated she needs more cream. She stated that she took all bedding/ clothing  to laundry mat.  Please advise

## 2021-04-16 ASSESSMENT — ENCOUNTER SYMPTOMS
WHEEZING: 1
SHORTNESS OF BREATH: 1
COUGH: 1

## 2021-04-16 NOTE — PROGRESS NOTES
[address] [place] [address] [address], [address], [place]  Phone:  [phone]  IEK:[phone]       Name: Curtis Carter  : 1965    Chief Complaint   Patient presents with    Anxiety    Depression       HPI:     Curtis Carter is a 48 y.o. female who presents today for follow-up of anxiety and depression. She recently got out of prison after being there 30 days. While she was there, she quit smoking and drinking. She is eating better and is up 18 lbs since February (she's very proud of it). Her mood is better but she still gets stressed so needs the Ativan. Current Outpatient Medications:     Omega-3 Fatty Acids (FISH OIL) 1000 MG CAPS, Take 3,000 mg by mouth 3 times daily, Disp: , Rfl:     Ascorbic Acid (STEPH-C PO), Take by mouth daily, Disp: , Rfl:     LORazepam (ATIVAN) 0.5 MG tablet, Take 1 tablet by mouth every 8 hours as needed for Anxiety for up to 30 days. , Disp: 90 tablet, Rfl: 2    sertraline (ZOLOFT) 100 MG tablet, Take 2 tablets by mouth daily Take 100 mg tablet with 50 mg tablet for 150 mg daily. , Disp: 180 tablet, Rfl: 1    omeprazole (PRILOSEC) 40 MG delayed release capsule, Take 1 capsule by mouth every morning (before breakfast), Disp: 30 capsule, Rfl: 5    mirtazapine (REMERON) 30 MG tablet, Take 1 tablet by mouth nightly, Disp: 90 tablet, Rfl: 1    permethrin (ELIMITE) 5 % cream, Apply topically as directed, Disp: 1 Tube, Rfl: 0    megestrol (MEGACE) 40 MG tablet, Take 1 tablet by mouth daily, Disp: 90 tablet, Rfl: 1    polyethylene glycol (GLYCOLAX) 17 GM/SCOOP powder, Dispense 238 Gram Bottle.   Use as Directed, Disp: 238 g, Rfl: 0    oxybutynin (DITROPAN XL) 5 MG extended release tablet, Take 1 tablet by mouth daily, Disp: 90 tablet, Rfl: 1    RA ASPIRIN ADULT LOW STRENGTH 81 MG chewable tablet, chew and swallow 1 tablet by mouth once daily, Disp: 365 tablet, Rfl: 0    Multiple Vitamins-Minerals (RA ONE DAILY MAXIMUM) TABS, take 1 tablet by mouth once daily, Disp: 30 tablet, Rfl: 3    SYMBICORT 160-4.5 MCG/ACT AERO, inhale 2 puffs by mouth twice a day, Disp: 10.2 g, Rfl: 5    albuterol sulfate HFA (VENTOLIN HFA) 108 (90 Base) MCG/ACT inhaler, Inhale 2 puffs into the lungs every 4 hours as needed for Wheezing, Disp: 1 Inhaler, Rfl: 5    No Known Allergies    Subjective:      Review of Systems   Respiratory: Positive for cough, shortness of breath and wheezing. Psychiatric/Behavioral: Positive for dysphoric mood and sleep disturbance. Negative for self-injury and suicidal ideas. The patient is nervous/anxious. Objective:     /70 (Site: Left Upper Arm, Position: Sitting, Cuff Size: Large Adult)   Pulse 102   Temp 97.6 °F (36.4 °C)   Ht 5' 4\" (1.626 m)   Wt 128 lb (58.1 kg)   SpO2 99%   BMI 21.97 kg/m²     Physical Exam  Vitals signs and nursing note reviewed. Constitutional:       General: She is not in acute distress. Appearance: She is well-developed. HENT:      Head: Normocephalic and atraumatic. Nose: Nose normal.   Eyes:      Conjunctiva/sclera: Conjunctivae normal.   Neck:      Musculoskeletal: Normal range of motion and neck supple. Cardiovascular:      Rate and Rhythm: Normal rate and regular rhythm. Heart sounds: Normal heart sounds. Pulmonary:      Effort: Pulmonary effort is normal. No respiratory distress. Breath sounds: Normal breath sounds. No wheezing. Skin:     General: Skin is warm and dry. Neurological:      Mental Status: She is alert and oriented to person, place, and time. Psychiatric:         Behavior: Behavior normal.       Assessment/Plan:     Codie Navarrete was seen today for anxiety and depression. Diagnoses and all orders for this visit:    Anxiety and depression  Schizoaffective disorder, bipolar type with good prognostic features ([address]  -     Her anxiety level is high from not being able to see her grandchildren so will increase her Zoloft from 150 mg to 200 mg daily.   She was advised to contact our office with worsening mood or medication side effects.  -     LORazepam (ATIVAN) 0.5 MG tablet; Take 1 tablet by mouth every 8 hours as needed for Anxiety for up to 30 days. -     sertraline (ZOLOFT) 100 MG tablet; Take 2 tablets by mouth daily Take 100 mg tablet with 50 mg tablet for 150 mg daily    Psychophysiological insomnia  -     mirtazapine (REMERON) 30 MG tablet; Take 1 tablet by mouth nightly    Gastroesophageal reflux disease, unspecified whether esophagitis present  -     omeprazole (PRILOSEC) 40 MG delayed release capsule; Take 1 capsule by mouth every morning (before breakfast)    Tobacco use  -     She has stopped smoking and was encouraged to continue to abstain from it. Alcohol use  -     She has recently stopped drinking and was advised to continue to refrain. Return in about 3 months (around 7/19/2021) for anxiety.     Electronically signed by Maverick Kat MD on 4/19/2021 at 3:03 PM

## 2021-04-19 ENCOUNTER — OFFICE VISIT (OUTPATIENT)
Dept: FAMILY MEDICINE CLINIC | Age: 56
End: 2021-04-19
Payer: MEDICARE

## 2021-04-19 VITALS
DIASTOLIC BLOOD PRESSURE: 70 MMHG | HEART RATE: 102 BPM | TEMPERATURE: 97.6 F | HEIGHT: 64 IN | BODY MASS INDEX: 21.85 KG/M2 | OXYGEN SATURATION: 99 % | WEIGHT: 128 LBS | SYSTOLIC BLOOD PRESSURE: 110 MMHG

## 2021-04-19 DIAGNOSIS — F41.9 ANXIETY AND DEPRESSION: Primary | ICD-10-CM

## 2021-04-19 DIAGNOSIS — K21.9 GASTROESOPHAGEAL REFLUX DISEASE, UNSPECIFIED WHETHER ESOPHAGITIS PRESENT: ICD-10-CM

## 2021-04-19 DIAGNOSIS — F32.A ANXIETY AND DEPRESSION: Primary | ICD-10-CM

## 2021-04-19 DIAGNOSIS — Z72.0 TOBACCO USE: ICD-10-CM

## 2021-04-19 DIAGNOSIS — Z78.9 ALCOHOL USE: ICD-10-CM

## 2021-04-19 DIAGNOSIS — F51.04 PSYCHOPHYSIOLOGICAL INSOMNIA: ICD-10-CM

## 2021-04-19 DIAGNOSIS — F25.0 SCHIZOAFFECTIVE DISORDER, BIPOLAR TYPE WITH GOOD PROGNOSTIC FEATURES (HCC): ICD-10-CM

## 2021-04-19 PROCEDURE — 3017F COLORECTAL CA SCREEN DOC REV: CPT | Performed by: FAMILY MEDICINE

## 2021-04-19 PROCEDURE — G8420 CALC BMI NORM PARAMETERS: HCPCS | Performed by: FAMILY MEDICINE

## 2021-04-19 PROCEDURE — G8427 DOCREV CUR MEDS BY ELIG CLIN: HCPCS | Performed by: FAMILY MEDICINE

## 2021-04-19 PROCEDURE — 99214 OFFICE O/P EST MOD 30 MIN: CPT | Performed by: FAMILY MEDICINE

## 2021-04-19 PROCEDURE — 4004F PT TOBACCO SCREEN RCVD TLK: CPT | Performed by: FAMILY MEDICINE

## 2021-04-19 RX ORDER — LORAZEPAM 0.5 MG/1
0.5 TABLET ORAL EVERY 8 HOURS PRN
Qty: 90 TABLET | Refills: 2 | Status: SHIPPED | OUTPATIENT
Start: 2021-04-19 | End: 2021-07-06 | Stop reason: SDUPTHER

## 2021-04-19 RX ORDER — CHLORAL HYDRATE 500 MG
3000 CAPSULE ORAL 3 TIMES DAILY
COMMUNITY

## 2021-04-19 RX ORDER — MIRTAZAPINE 30 MG/1
30 TABLET, FILM COATED ORAL NIGHTLY
Qty: 90 TABLET | Refills: 1 | Status: SHIPPED | OUTPATIENT
Start: 2021-04-19 | End: 2022-01-14

## 2021-04-19 RX ORDER — SERTRALINE HYDROCHLORIDE 100 MG/1
200 TABLET, FILM COATED ORAL DAILY
Qty: 180 TABLET | Refills: 1 | Status: SHIPPED | OUTPATIENT
Start: 2021-04-19 | End: 2021-10-05 | Stop reason: SDUPTHER

## 2021-04-19 RX ORDER — OMEPRAZOLE 40 MG/1
40 CAPSULE, DELAYED RELEASE ORAL
Qty: 30 CAPSULE | Refills: 5 | Status: SHIPPED | OUTPATIENT
Start: 2021-04-19 | End: 2021-10-05 | Stop reason: SDUPTHER

## 2021-04-19 NOTE — LETTER
[address] Medicine  [address] [facility]  Phone: [phone]  Fax: [phone]    Jaleesa Lindo MD        April 19, 2021     Patient: Berto Feldman   YOB: 1965   Date of Visit: 4/19/2021       To Whom It May Concern:    Kelly Salguero was seen in my office today 4/19/21 for a medical appointment. If you have any questions or concerns, please don't hesitate to call.     Sincerely,    Jaleesa Lindo MD

## 2021-04-21 NOTE — TELEPHONE ENCOUNTER
Maria Ines Esters called requesting a refill on the following medications:  Requested Prescriptions     Pending Prescriptions Disp Refills    Multiple Vitamins-Minerals (RA ONE DAILY MAXIMUM) TABS 30 tablet 3     Sig: take 1 tablet by mouth once daily       Date of last visit: 4/19/2021  Date of next visit (if applicable):Visit date not found  Date of last refill: 02/24/20  Pharmacy Name: [facility]      Thanks,  Elisha March LPN

## 2021-04-21 NOTE — TELEPHONE ENCOUNTER
Mervin Cerrato called requesting a refill on the following medications:  Requested Prescriptions     Pending Prescriptions Disp Refills    Multiple Vitamins-Minerals (RA ONE DAILY MAXIMUM) TABS 30 tablet 3     Sig: take 1 tablet by mouth once daily     Pharmacy verified:  [email or web address]      Date of last visit:  4/19/21  Date of next visit (if applicable): Visit date not found

## 2021-04-22 RX ORDER — FOLIC ACID/MULTIVIT,IRON,MINER 0.4MG-18MG
TABLET ORAL
Qty: 30 TABLET | Refills: 3 | Status: SHIPPED | OUTPATIENT
Start: 2021-04-22

## 2021-04-30 ENCOUNTER — TELEPHONE (OUTPATIENT)
Dept: FAMILY MEDICINE CLINIC | Age: 56
End: 2021-04-30

## 2021-04-30 NOTE — TELEPHONE ENCOUNTER
Constanza Handy calls requesting something for night sweats and also would like to cut her Megace to 20 mg if that's OK.

## 2021-05-12 ASSESSMENT — ENCOUNTER SYMPTOMS
SHORTNESS OF BREATH: 0
COLOR CHANGE: 0
COUGH: 0

## 2021-05-12 NOTE — PROGRESS NOTES
[address] [place] [address] Rd, [address], [place]  Phone:  [phone]  UQA:[phone]       Name: Sudhir Rodriguez  : 1965    Chief Complaint   Patient presents with    Breast Problem     had mammogram done on Friday    breast pain   all started 1 month ago         HPI:     Sudhir Rodriguez is a 48 y.o. female who presents today for evaluation of bilateral breast tenderness for the past month. There is no family history of breast cancer, but her mom had lung and brain cancer. She had a mammogram on Friday which revealed no evidence of malignancy. She recently quit smoking and drinking and has been gaining weight. She had to get new bras as her old ones no longer fit. Her breasts feel heavy. Current Outpatient Medications:     Multiple Vitamins-Minerals (RA ONE DAILY MAXIMUM) TABS, take 1 tablet by mouth once daily, Disp: 30 tablet, Rfl: 3    Omega-3 Fatty Acids (FISH OIL) 1000 MG CAPS, Take 3,000 mg by mouth 3 times daily, Disp: , Rfl:     Ascorbic Acid (STEPH-C PO), Take by mouth daily, Disp: , Rfl:     LORazepam (ATIVAN) 0.5 MG tablet, Take 1 tablet by mouth every 8 hours as needed for Anxiety for up to 30 days. , Disp: 90 tablet, Rfl: 2    sertraline (ZOLOFT) 100 MG tablet, Take 2 tablets by mouth daily Take 100 mg tablet with 50 mg tablet for 150 mg daily. , Disp: 180 tablet, Rfl: 1    omeprazole (PRILOSEC) 40 MG delayed release capsule, Take 1 capsule by mouth every morning (before breakfast), Disp: 30 capsule, Rfl: 5    mirtazapine (REMERON) 30 MG tablet, Take 1 tablet by mouth nightly, Disp: 90 tablet, Rfl: 1    permethrin (ELIMITE) 5 % cream, Apply topically as directed, Disp: 1 Tube, Rfl: 0    polyethylene glycol (GLYCOLAX) 17 GM/SCOOP powder, Dispense 238 Gram Bottle.   Use as Directed, Disp: 238 g, Rfl: 0    oxybutynin (DITROPAN XL) 5 MG extended release tablet, Take 1 tablet by mouth daily, Disp: 90 tablet, Rfl: 1    RA ASPIRIN ADULT LOW STRENGTH 81 MG chewable tablet, chew and swallow 1 tablet by mouth once daily, Disp: 365 tablet, Rfl: 0    SYMBICORT 160-4.5 MCG/ACT AERO, inhale 2 puffs by mouth twice a day, Disp: 10.2 g, Rfl: 5    albuterol sulfate HFA (VENTOLIN HFA) 108 (90 Base) MCG/ACT inhaler, Inhale 2 puffs into the lungs every 4 hours as needed for Wheezing, Disp: 1 Inhaler, Rfl: 5    No Known Allergies    Subjective:      Review of Systems   Constitutional: Negative for chills and fever. Respiratory: Negative for cough and shortness of breath. Skin: Negative for color change and rash. Objective:     /62 (Site: Left Upper Arm, Position: Sitting, Cuff Size: Small Adult)   Pulse 68   Temp 98.4 °F (36.9 °C)   Ht 5' 4\" (1.626 m)   Wt 137 lb (62.1 kg)   SpO2 99%   BMI 23.52 kg/m²     Physical Exam  Vitals signs and nursing note reviewed. Constitutional:       General: She is not in acute distress. Appearance: She is well-developed. HENT:      Head: Normocephalic and atraumatic. Nose: Nose normal.   Eyes:      Conjunctiva/sclera: Conjunctivae normal.   Neck:      Musculoskeletal: Normal range of motion and neck supple. Cardiovascular:      Rate and Rhythm: Normal rate and regular rhythm. Heart sounds: Normal heart sounds. Pulmonary:      Effort: Pulmonary effort is normal. No respiratory distress. Breath sounds: Normal breath sounds. No wheezing. Abdominal:      General: Bowel sounds are normal. There is no distension. Palpations: Abdomen is soft. Tenderness: There is no abdominal tenderness. Skin:     General: Skin is warm and dry. Findings: No erythema or rash. Neurological:      Mental Status: She is alert and oriented to person, place, and time. Psychiatric:         Behavior: Behavior normal.       Assessment/Plan:     Efrain Cedillo was seen today for breast problem. Diagnoses and all orders for this visit:    Breast tenderness        -     Denise had a normal mammogram last week.   Her breast tenderness/heaviness is likely related to her weight gain as since she stopped smoking and drinking, she's gained 27 lbs in the past 2 months, mostly in her chest.      Return if symptoms worsen or fail to improve.     Electronically signed by Cara Burton MD on 5/18/2021 at 9:45 AM

## 2021-05-14 ENCOUNTER — HOSPITAL ENCOUNTER (OUTPATIENT)
Dept: MAMMOGRAPHY | Age: 56
Discharge: HOME | End: 2021-05-14
Payer: MEDICARE

## 2021-05-14 DIAGNOSIS — Z12.31 ENCOUNTER FOR SCREENING MAMMOGRAM FOR BREAST CANCER: ICD-10-CM

## 2021-05-14 PROCEDURE — 77063 BREAST TOMOSYNTHESIS BI: CPT

## 2021-05-18 ENCOUNTER — OFFICE VISIT (OUTPATIENT)
Dept: FAMILY MEDICINE CLINIC | Age: 56
End: 2021-05-18
Payer: MEDICARE

## 2021-05-18 VITALS
HEIGHT: 64 IN | SYSTOLIC BLOOD PRESSURE: 110 MMHG | BODY MASS INDEX: 23.39 KG/M2 | DIASTOLIC BLOOD PRESSURE: 62 MMHG | HEART RATE: 68 BPM | OXYGEN SATURATION: 99 % | WEIGHT: 137 LBS | TEMPERATURE: 98.4 F

## 2021-05-18 DIAGNOSIS — N64.4 BREAST TENDERNESS: Primary | ICD-10-CM

## 2021-05-18 PROCEDURE — 4004F PT TOBACCO SCREEN RCVD TLK: CPT | Performed by: FAMILY MEDICINE

## 2021-05-18 PROCEDURE — G8420 CALC BMI NORM PARAMETERS: HCPCS | Performed by: FAMILY MEDICINE

## 2021-05-18 PROCEDURE — G8427 DOCREV CUR MEDS BY ELIG CLIN: HCPCS | Performed by: FAMILY MEDICINE

## 2021-05-18 PROCEDURE — 99213 OFFICE O/P EST LOW 20 MIN: CPT | Performed by: FAMILY MEDICINE

## 2021-05-18 PROCEDURE — 3017F COLORECTAL CA SCREEN DOC REV: CPT | Performed by: FAMILY MEDICINE

## 2021-05-18 NOTE — LETTER
[address] Medicine  [address] [facility]  Phone: [phone]  Fax: [phone]    Josh Machado MD        May 18, 2021     Patient: Erika Javed   YOB: 1965   Date of Visit: 5/18/2021       To Whom It May Concern:    Kimberly Rouse was seen in my office today for medical reasons. If you have any questions or concerns, please don't hesitate to call.     Sincerely,    Josh Machado MD

## 2021-06-14 ENCOUNTER — NURSE TRIAGE (OUTPATIENT)
Dept: OTHER | Facility: CLINIC | Age: 56
End: 2021-06-14

## 2021-06-14 NOTE — TELEPHONE ENCOUNTER
Reason for Disposition   Headache is a chronic symptom (recurrent or ongoing AND lasting > 4 weeks)    Answer Assessment - Initial Assessment Questions  1. LOCATION: \"Where does it hurt? \"      Forehead    2. ONSET: \"When did the headache start? \" (Minutes, hours or days)     1 hour ago    3. PATTERN: \"Does the pain come and go, or has it been constant since it started? \"   comes and goes    4. SEVERITY: \"How bad is the pain? \" and \"What does it keep you from doing? \"  (e.g., Scale 1-10; mild, moderate, or severe)    - MILD (1-3): doesn't interfere with normal activities     - MODERATE (4-7): interferes with normal activities or awakens from sleep     - SEVERE (8-10): excruciating pain, unable to do any normal activities      moderate    5. RECURRENT SYMPTOM: \"Have you ever had headaches before? \" If so, ask: \"When was the last time? \" and \"What happened that time? \"   Yes, yesterday, took advil and it went away    6. CAUSE: \"What do you think is causing the headache? \"    Pollen    7. MIGRAINE: \"Have you been diagnosed with migraine headaches? \" If so, ask: \"Is this headache similar? \"   Yes, yes    8. HEAD INJURY: \"Has there been any recent injury to the head? \"   No    9. OTHER SYMPTOMS: \"Do you have any other symptoms? \" (fever, stiff neck, eye pain, sore throat, cold symptoms)  Eye pain    10. PREGNANCY: \"Is there any chance you are pregnant? \" \"When was your last menstrual period? \"   no, no cycle    Protocols used: HEADACHE-ADULT-OH  Received call from [facility] at pre-service center [facility]) KATY FRANKLIN II.VIERT[place] with Red Flag Complaint. Brief description of triage: headache    Triage indicates for patient to be seen in next 2 weeks. States migraine history and pain relief with advil. Care advice provided, patient verbalizes understanding; denies any other questions or concerns; instructed to call back for any new or worsening symptoms. Writer provided warm transfer to Murphy at [facility] for appointment scheduling.     Attention Provider: Thank you for allowing me to participate in the care of your patient. The patient was connected to triage in response to information provided to the ECC. Please do not respond through this encounter as the response is not directed to a shared pool.

## 2021-06-17 ENCOUNTER — VIRTUAL VISIT (OUTPATIENT)
Dept: FAMILY MEDICINE CLINIC | Age: 56
End: 2021-06-17
Payer: MEDICARE

## 2021-06-17 DIAGNOSIS — Z00.00 HEALTHCARE MAINTENANCE: ICD-10-CM

## 2021-06-17 DIAGNOSIS — R51.9 CHRONIC DAILY HEADACHE: Primary | ICD-10-CM

## 2021-06-17 PROCEDURE — G8428 CUR MEDS NOT DOCUMENT: HCPCS | Performed by: FAMILY MEDICINE

## 2021-06-17 PROCEDURE — G8420 CALC BMI NORM PARAMETERS: HCPCS | Performed by: FAMILY MEDICINE

## 2021-06-17 PROCEDURE — 99213 OFFICE O/P EST LOW 20 MIN: CPT | Performed by: FAMILY MEDICINE

## 2021-06-17 PROCEDURE — 4004F PT TOBACCO SCREEN RCVD TLK: CPT | Performed by: FAMILY MEDICINE

## 2021-06-17 PROCEDURE — 3017F COLORECTAL CA SCREEN DOC REV: CPT | Performed by: FAMILY MEDICINE

## 2021-06-17 RX ORDER — AMITRIPTYLINE HYDROCHLORIDE 10 MG/1
10 TABLET, FILM COATED ORAL NIGHTLY
Qty: 30 TABLET | Refills: 1 | Status: SHIPPED | OUTPATIENT
Start: 2021-06-17 | End: 2021-07-27 | Stop reason: SDUPTHER

## 2021-06-17 ASSESSMENT — ENCOUNTER SYMPTOMS
NAUSEA: 0
VOMITING: 0

## 2021-06-17 NOTE — PROGRESS NOTES
of neck        [] Abnormal-       Neurological:        [] No Facial Asymmetry (Cranial nerve 7 motor function) (limited exam to video visit)          [] No gaze palsy        [] Abnormal-         Skin:        [] No significant exanthematous lesions or discoloration noted on facial skin         [] Abnormal-            Psychiatric:       [] Normal Affect [] No Hallucinations        [] Abnormal-       ASSESSMENT/PLAN:  1. Chronic daily headache  - She has been struggling with headaches nearly daily so will start on Elavil. She was advised to contact our office with worsening headaches or medication side effects. - amitriptyline (ELAVIL) 10 MG tablet; Take 1 tablet by mouth nightly  Dispense: 30 tablet; Refill: 1    2. Healthcare maintenance  - A healthy diet and routine physical activity encouraged. - Lipid Panel; Future  - Basic Metabolic Panel; Future  - CBC With Auto Differential; Future      Return in about 1 month (around 7/17/2021) for headaches. Berto Feldman is a 48 y.o. female being evaluated by a Virtual Visit (video visit) encounter to address concerns as mentioned above. A caregiver was present when appropriate. Due to this being a TeleHealth encounter (During [place] [address] public health emergency), evaluation of the following organ systems was limited: Vitals/Constitutional/EENT/Resp/CV/GI//MS/Neuro/Skin/Heme-Lymph-Imm. Pursuant to the emergency declaration under the [address] [place] [address], [address] [place] [address] authority and the [employer] Act, this Virtual Visit was conducted with patient's (and/or legal guardian's) consent, to reduce the patient's risk of exposure to COVID-19 and provide necessary medical care. The patient (and/or legal guardian) has also been advised to contact this office for worsening conditions or problems, and seek emergency medical treatment and/or call 911 if deemed necessary.      Patient identification was verified at the start of the visit: Yes    Services were provided through a video synchronous discussion virtually to substitute for in-person clinic visit. Patient and provider were located at their individual homes. --Meme Fu MD on 6/17/2021 at 3:58 PM    An electronic signature was used to authenticate this note.

## 2021-06-21 ENCOUNTER — HOSPITAL ENCOUNTER (OUTPATIENT)
Age: 56
Discharge: HOME | End: 2021-06-21
Payer: MEDICARE

## 2021-06-21 DIAGNOSIS — Z00.00 HEALTHCARE MAINTENANCE: ICD-10-CM

## 2021-06-21 LAB
ANION GAP SERPL CALCULATED.3IONS-SCNC: 8 MEQ/L (ref 8–16)
BASOPHILS # BLD: 0.9 %
BASOPHILS ABSOLUTE: 0.1 THOU/MM3 (ref 0–0.1)
BUN BLDV-MCNC: 14 MG/DL (ref 7–22)
CALCIUM SERPL-MCNC: 9.2 MG/DL (ref 8.5–10.5)
CHLORIDE BLD-SCNC: 108 MEQ/L (ref 98–111)
CHOLESTEROL, TOTAL: 237 MG/DL (ref 100–199)
CO2: 27 MEQ/L (ref 23–33)
CREAT SERPL-MCNC: 0.6 MG/DL (ref 0.4–1.2)
EOSINOPHIL # BLD: 2.9 %
EOSINOPHILS ABSOLUTE: 0.2 THOU/MM3 (ref 0–0.4)
ERYTHROCYTE [DISTWIDTH] IN BLOOD BY AUTOMATED COUNT: 12.7 % (ref 11.5–14.5)
ERYTHROCYTE [DISTWIDTH] IN BLOOD BY AUTOMATED COUNT: 45.9 FL (ref 35–45)
GFR SERPL CREATININE-BSD FRML MDRD: > 90 ML/MIN/1.73M2
GLUCOSE BLD-MCNC: 101 MG/DL (ref 70–108)
HCT VFR BLD CALC: 43.3 % (ref 37–47)
HDLC SERPL-MCNC: 53 MG/DL
HEMOGLOBIN: 13.5 GM/DL (ref 12–16)
IMMATURE GRANS (ABS): 0.04 THOU/MM3 (ref 0–0.07)
IMMATURE GRANULOCYTES: 0.6 %
LDL CHOLESTEROL CALCULATED: 145 MG/DL
LYMPHOCYTES # BLD: 45.1 %
LYMPHOCYTES ABSOLUTE: 3.1 THOU/MM3 (ref 1–4.8)
MCH RBC QN AUTO: 30.4 PG (ref 26–33)
MCHC RBC AUTO-ENTMCNC: 31.2 GM/DL (ref 32.2–35.5)
MCV RBC AUTO: 97.5 FL (ref 81–99)
MONOCYTES # BLD: 7.5 %
MONOCYTES ABSOLUTE: 0.5 THOU/MM3 (ref 0.4–1.3)
NUCLEATED RED BLOOD CELLS: 0 /100 WBC
PLATELET # BLD: 284 THOU/MM3 (ref 130–400)
PMV BLD AUTO: 9.9 FL (ref 9.4–12.4)
POTASSIUM SERPL-SCNC: 4 MEQ/L (ref 3.5–5.2)
RBC # BLD: 4.44 MILL/MM3 (ref 4.2–5.4)
SEG NEUTROPHILS: 43 %
SEGMENTED NEUTROPHILS ABSOLUTE COUNT: 2.9 THOU/MM3 (ref 1.8–7.7)
SODIUM BLD-SCNC: 143 MEQ/L (ref 135–145)
TRIGL SERPL-MCNC: 196 MG/DL (ref 0–199)
WBC # BLD: 6.8 THOU/MM3 (ref 4.8–10.8)

## 2021-06-21 PROCEDURE — 36415 COLL VENOUS BLD VENIPUNCTURE: CPT

## 2021-06-21 PROCEDURE — 80061 LIPID PANEL: CPT

## 2021-06-21 PROCEDURE — 85025 COMPLETE CBC W/AUTO DIFF WBC: CPT

## 2021-06-21 PROCEDURE — 80048 BASIC METABOLIC PNL TOTAL CA: CPT

## 2021-06-22 ENCOUNTER — TELEPHONE (OUTPATIENT)
Dept: FAMILY MEDICINE CLINIC | Age: 56
End: 2021-06-22

## 2021-06-28 ENCOUNTER — TELEPHONE (OUTPATIENT)
Dept: FAMILY MEDICINE CLINIC | Age: 56
End: 2021-06-28

## 2021-06-28 NOTE — TELEPHONE ENCOUNTER
----- Message from Giulia Mott sent at 6/28/2021 11:03 AM EDT -----  Subject: Medication Problem    QUESTIONS  Name of Medication? amitriptyline (ELAVIL) 10 MG tablet  Patient-reported dosage and instructions? 10mg 1 po nightly  What question or problem do you have with the medication? Pt calling   stating she has been taking the medication as prescribed and is still   waking up with headaches. Preferred Pharmacy? [address] [facility]  Pharmacy phone number (if available)? [phone]  Additional Information for Provider? Pt calling stating she has been   taking the medication as prescribed and is still waking up with headaches. I advised someone from the office would be in contact with her.  ---------------------------------------------------------------------------  --------------  [address]  What is the best way for the office to contact you? OK to leave message on   voicemail  Preferred Call Back Phone Number? [phone]  ---------------------------------------------------------------------------  --------------  SCRIPT ANSWERS  Relationship to Patient?  Self

## 2021-06-30 NOTE — TELEPHONE ENCOUNTER
Paul Shrestha called requesting a refill on the following medications:  Requested Prescriptions     Pending Prescriptions Disp Refills    megestrol (MEGACE) 20 MG tablet [Pharmacy Med Name: MEGESTROL 20 MG TABLET] 90 tablet 1     Sig: TAKE 1 TABLET BY MOUTH DAILY       Date of last visit: 7/13/2020  Date of next visit (if applicable):N/A  Date of last refill: 11/19/2019  Pharmacy Name: [facility]      Tennille Forman, [address] [place] [address]
No

## 2021-07-06 DIAGNOSIS — F32.A ANXIETY AND DEPRESSION: ICD-10-CM

## 2021-07-06 DIAGNOSIS — F41.9 ANXIETY AND DEPRESSION: ICD-10-CM

## 2021-07-06 RX ORDER — LORAZEPAM 0.5 MG/1
0.5 TABLET ORAL EVERY 8 HOURS PRN
Qty: 90 TABLET | Refills: 2 | Status: SHIPPED | OUTPATIENT
Start: 2021-07-06 | End: 2021-10-05 | Stop reason: SDUPTHER

## 2021-07-27 DIAGNOSIS — R51.9 CHRONIC DAILY HEADACHE: ICD-10-CM

## 2021-07-27 RX ORDER — AMITRIPTYLINE HYDROCHLORIDE 10 MG/1
20 TABLET, FILM COATED ORAL NIGHTLY
Qty: 60 TABLET | Refills: 1 | Status: SHIPPED | OUTPATIENT
Start: 2021-07-27 | End: 2021-10-05 | Stop reason: SDUPTHER

## 2021-09-24 ENCOUNTER — HOSPITAL ENCOUNTER (EMERGENCY)
Age: 56
Discharge: HOME | End: 2021-09-24
Payer: MEDICARE

## 2021-09-24 VITALS
HEIGHT: 64 IN | WEIGHT: 143 LBS | SYSTOLIC BLOOD PRESSURE: 122 MMHG | BODY MASS INDEX: 24.41 KG/M2 | HEART RATE: 80 BPM | DIASTOLIC BLOOD PRESSURE: 79 MMHG | OXYGEN SATURATION: 98 % | RESPIRATION RATE: 18 BRPM | TEMPERATURE: 98.2 F

## 2021-09-24 DIAGNOSIS — S29.011A CHEST WALL MUSCLE STRAIN, INITIAL ENCOUNTER: Primary | ICD-10-CM

## 2021-09-24 LAB
EKG ATRIAL RATE: 89 BPM
EKG P AXIS: 80 DEGREES
EKG P-R INTERVAL: 150 MS
EKG Q-T INTERVAL: 370 MS
EKG QRS DURATION: 78 MS
EKG QTC CALCULATION (BAZETT): 450 MS
EKG R AXIS: 65 DEGREES
EKG T AXIS: 69 DEGREES
EKG VENTRICULAR RATE: 89 BPM

## 2021-09-24 PROCEDURE — 93005 ELECTROCARDIOGRAM TRACING: CPT | Performed by: NURSE PRACTITIONER

## 2021-09-24 PROCEDURE — 99214 OFFICE O/P EST MOD 30 MIN: CPT | Performed by: NURSE PRACTITIONER

## 2021-09-24 PROCEDURE — 93010 ELECTROCARDIOGRAM REPORT: CPT | Performed by: NUCLEAR MEDICINE

## 2021-09-24 PROCEDURE — 99214 OFFICE O/P EST MOD 30 MIN: CPT

## 2021-09-24 ASSESSMENT — PAIN DESCRIPTION - PAIN TYPE: TYPE: ACUTE PAIN

## 2021-09-24 ASSESSMENT — ENCOUNTER SYMPTOMS
NAUSEA: 0
VOMITING: 0
SHORTNESS OF BREATH: 0

## 2021-09-24 ASSESSMENT — PAIN DESCRIPTION - DESCRIPTORS: DESCRIPTORS: SHARP

## 2021-09-24 ASSESSMENT — PAIN SCALES - GENERAL: PAINLEVEL_OUTOF10: 4

## 2021-09-24 ASSESSMENT — PAIN DESCRIPTION - FREQUENCY: FREQUENCY: INTERMITTENT

## 2021-09-24 ASSESSMENT — PAIN DESCRIPTION - ORIENTATION: ORIENTATION: LEFT

## 2021-09-24 ASSESSMENT — PAIN - FUNCTIONAL ASSESSMENT: PAIN_FUNCTIONAL_ASSESSMENT: ACTIVITIES ARE NOT PREVENTED

## 2021-09-24 ASSESSMENT — PAIN DESCRIPTION - LOCATION: LOCATION: CHEST

## 2021-09-24 NOTE — ED TRIAGE NOTES
Pt co left upper chest pain sharp in nature and reproducable. Respirations even and unlabored. Skin pink, warm, and dry.

## 2021-09-24 NOTE — ED PROVIDER NOTES
[address]  Urgent Care Encounter       CHIEF COMPLAINT       Chief Complaint   Patient presents with    Chest Pain    Pleurisy       Nurses Notes reviewed and I agree except as noted in the HPI. HISTORY OF PRESENT ILLNESS   Brina Nice is a 55 y.o. female who presents with complaints of left sided chest pain, onset last night. Patient does report the pain area hurts more with the certain movements and with deep breaths. She reports a history of pleurisy several times over the last year. She denies shortness of breath, nausea, vomiting, dizziness or lightheadedness. The pain does not radiate. She does report attempting to lift a heavy toolbox last night prior to the onset of pain. No personal or family history of heart disease. The history is provided by the patient. REVIEW OF SYSTEMS     Review of Systems   Constitutional: Negative for fever. Respiratory: Negative for shortness of breath. Cardiovascular: Positive for chest pain. Negative for palpitations. Gastrointestinal: Negative for nausea and vomiting. Skin: Negative for wound. Neurological: Negative for dizziness and light-headedness. PAST MEDICAL HISTORY         Diagnosis Date    COPD (chronic obstructive pulmonary disease) ([address]     GERD (gastroesophageal reflux disease)     Schizoaffective disorder, bipolar type with good prognostic features ([address]     TIA (transient ischemic attack)        SURGICALHISTORY     Patient  has a past surgical history that includes Tonsillectomy; Tubal ligation; Appendectomy; Carpal tunnel release (Right, 03/2019); and pr bx of breast,vacuum asst,image guide (Left).     CURRENT MEDICATIONS       Discharge Medication List as of 9/24/2021  1:26 PM      CONTINUE these medications which have NOT CHANGED    Details   amitriptyline (ELAVIL) 10 MG tablet Take 2 tablets by mouth nightly, Disp-60 tablet, R-1Normal      Multiple Vitamins-Minerals (RA ONE DAILY MAXIMUM) TABS take 1 tablet by mouth once daily, Disp-30 tablet, R-3Normal      Omega-3 Fatty Acids (FISH OIL) 1000 MG CAPS Take 3,000 mg by mouth 3 times dailyHistorical Med      Ascorbic Acid (STEPH-C PO) Take by mouth dailyHistorical Med      sertraline (ZOLOFT) 100 MG tablet Take 2 tablets by mouth daily Take 100 mg tablet with 50 mg tablet for 150 mg daily. , Disp-180 tablet, R-1Normal      omeprazole (PRILOSEC) 40 MG delayed release capsule Take 1 capsule by mouth every morning (before breakfast), Disp-30 capsule, R-5Normal      mirtazapine (REMERON) 30 MG tablet Take 1 tablet by mouth nightly, Disp-90 tablet, R-1Normal      polyethylene glycol (GLYCOLAX) 17 GM/SCOOP powder Dispense 238 Gram Bottle. Use as Directed, Disp-238 g, R-0Normal      oxybutynin (DITROPAN XL) 5 MG extended release tablet Take 1 tablet by mouth daily, Disp-90 tablet, R-1Normal      RA ASPIRIN ADULT LOW STRENGTH 81 MG chewable tablet chew and swallow 1 tablet by mouth once daily, Disp-365 tablet, R-0Normal      SYMBICORT 160-4.5 MCG/ACT AERO inhale 2 puffs by mouth twice a day, Disp-10.2 g, R-5Normal      albuterol sulfate HFA (VENTOLIN HFA) 108 (90 Base) MCG/ACT inhaler Inhale 2 puffs into the lungs every 4 hours as needed for Wheezing, Disp-1 Inhaler, R-5Normal      permethrin (ELIMITE) 5 % cream Apply topically as directed, Disp-1 Tube, R-0, Normal             ALLERGIES     Patient is has No Known Allergies. Patients   Immunization History   Administered Date(s) Administered    Influenza Virus Vaccine 09/27/2015, 11/06/2017    Influenza, Quadv, 6 mo and older, IM (Fluzone, Flulaval) 09/28/2018    Influenza, Denys Welsh, IM, (6 mo and older Fluzone, Flulaval, Fluarix and 3 yrs and older Afluria) 11/04/2016       FAMILY HISTORY     Patient's family history includes Brain Cancer in her mother; Breast Cancer in her maternal cousin; Cirrhosis in her maternal uncle; Dementia in her father; Zachariah Ebbs in her mother.     SOCIAL HISTORY     Patient  reports that she has quit smoking. Her smoking use included cigarettes. She has a 37.00 pack-year smoking history. She has never used smokeless tobacco. She reports previous alcohol use. She reports that she does not use drugs. PHYSICAL EXAM     ED TRIAGE VITALS  BP: 122/79, Temp: 98.2 °F (36.8 °C), Pulse: 80, Resp: 18, SpO2: 98 %,Estimated body mass index is 24.55 kg/m² as calculated from the following:    Height as of this encounter: 5' 4\" (1.626 m). Weight as of this encounter: 143 lb (64.9 kg). ,No LMP recorded. Patient is postmenopausal.    Physical Exam  Vitals and nursing note reviewed. Constitutional:       General: She is not in acute distress. Appearance: She is well-developed. HENT:      Head: Normocephalic and atraumatic. Cardiovascular:      Rate and Rhythm: Normal rate and regular rhythm. Heart sounds: Normal heart sounds, S1 normal and S2 normal.   Pulmonary:      Effort: Pulmonary effort is normal. No respiratory distress. Breath sounds: Normal breath sounds and air entry. Chest:      Chest wall: Tenderness (Left upper chest over pectoral muscle) present. Comments: Tenderness to the upper left chest.  Pain is reproducible. Pain increases with deep breathing and with stretching of the chest muscles. Skin:     General: Skin is warm and dry. Neurological:      General: No focal deficit present. Mental Status: She is alert and oriented to person, place, and time. Psychiatric:         Mood and Affect: Mood normal.         Speech: Speech normal.         Behavior: Behavior normal. Behavior is cooperative.          DIAGNOSTIC RESULTS     Labs:  Results for orders placed or performed during the hospital encounter of 09/24/21   EKG 12 Lead   Result Value Ref Range    Ventricular Rate 89 BPM    Atrial Rate 89 BPM    P-R Interval 150 ms    QRS Duration 78 ms    Q-T Interval 370 ms    QTc Calculation (Bazett) 450 ms    P Axis 80 degrees    R Axis 65 degrees    T Axis 69 degrees IMAGING:    No orders to display         EKG: Reviewed by this provider. Normal sinus rhythm with a rate of 89 bpm.  Normal intervals. No acute ST or T wave abnormalities noted. URGENT CARE COURSE:     Vitals:    09/24/21 1314   BP: 122/79   Pulse: 80   Resp: 18   Temp: 98.2 °F (36.8 °C)   TempSrc: Temporal   SpO2: 98%   Weight: 143 lb (64.9 kg)   Height: 5' 4\" (1.626 m)       Medications - No data to display         PROCEDURES:  None    FINAL IMPRESSION      1. Chest wall muscle strain, initial encounter          DISPOSITION/ PLAN     Patient presents with left-sided chest pain that is consistent with a chest wall muscle strain. Pain is reproducible and also is reproducible with deep breathing and movement. EKG was unremarkable. No cardiac history and no associated symptoms. Patient will avoid strenuous activity heavy lifting. She can take Tylenol and/or Motrin for the pain. Follow-up with family doctor in the next 3 days if not improved. Patient was explicitly instructed to go to the emergency room if pain increases and/or is associated with any other symptoms consistent with ACS. Further instructions were outlined verbally and in the patient's discharge instructions. All the patient's questions were answered. The patient/parent agreed with the plan and was discharged from the [facility] in good condition.       PATIENT REFERRED TO:  Best Altamirano MD  [address] [address]      DISCHARGE MEDICATIONS:  Discharge Medication List as of 9/24/2021  1:26 PM          Discharge Medication List as of 9/24/2021  1:26 PM          Discharge Medication List as of 9/24/2021  1:26 PM          KEITH Gutierrez CNP    (Please note that portions of this note were completed with a voice recognition program. Efforts were made to edit the dictations but occasionally words are mis-transcribed.)         KEITH Gutierrez CNP  09/24/21 5841

## 2021-09-27 ENCOUNTER — TELEPHONE (OUTPATIENT)
Dept: FAMILY MEDICINE CLINIC | Age: 56
End: 2021-09-27

## 2021-10-05 ENCOUNTER — OFFICE VISIT (OUTPATIENT)
Dept: FAMILY MEDICINE CLINIC | Age: 56
End: 2021-10-05
Payer: MEDICARE

## 2021-10-05 VITALS
HEART RATE: 64 BPM | WEIGHT: 142 LBS | BODY MASS INDEX: 24.37 KG/M2 | TEMPERATURE: 97.1 F | DIASTOLIC BLOOD PRESSURE: 62 MMHG | SYSTOLIC BLOOD PRESSURE: 112 MMHG | RESPIRATION RATE: 16 BRPM

## 2021-10-05 DIAGNOSIS — J42 CHRONIC BRONCHITIS, UNSPECIFIED CHRONIC BRONCHITIS TYPE (HCC): ICD-10-CM

## 2021-10-05 DIAGNOSIS — F32.A ANXIETY AND DEPRESSION: ICD-10-CM

## 2021-10-05 DIAGNOSIS — K21.9 GASTROESOPHAGEAL REFLUX DISEASE, UNSPECIFIED WHETHER ESOPHAGITIS PRESENT: ICD-10-CM

## 2021-10-05 DIAGNOSIS — N39.41 URGE INCONTINENCE: ICD-10-CM

## 2021-10-05 DIAGNOSIS — R51.9 CHRONIC DAILY HEADACHE: ICD-10-CM

## 2021-10-05 DIAGNOSIS — F41.9 ANXIETY AND DEPRESSION: ICD-10-CM

## 2021-10-05 PROCEDURE — 3017F COLORECTAL CA SCREEN DOC REV: CPT | Performed by: FAMILY MEDICINE

## 2021-10-05 PROCEDURE — G8926 SPIRO NO PERF OR DOC: HCPCS | Performed by: FAMILY MEDICINE

## 2021-10-05 PROCEDURE — G8420 CALC BMI NORM PARAMETERS: HCPCS | Performed by: FAMILY MEDICINE

## 2021-10-05 PROCEDURE — 3023F SPIROM DOC REV: CPT | Performed by: FAMILY MEDICINE

## 2021-10-05 PROCEDURE — 1036F TOBACCO NON-USER: CPT | Performed by: FAMILY MEDICINE

## 2021-10-05 PROCEDURE — G8484 FLU IMMUNIZE NO ADMIN: HCPCS | Performed by: FAMILY MEDICINE

## 2021-10-05 PROCEDURE — 99214 OFFICE O/P EST MOD 30 MIN: CPT | Performed by: FAMILY MEDICINE

## 2021-10-05 PROCEDURE — G8427 DOCREV CUR MEDS BY ELIG CLIN: HCPCS | Performed by: FAMILY MEDICINE

## 2021-10-05 RX ORDER — AMITRIPTYLINE HYDROCHLORIDE 10 MG/1
20 TABLET, FILM COATED ORAL NIGHTLY
Qty: 60 TABLET | Refills: 1 | Status: SHIPPED | OUTPATIENT
Start: 2021-10-05 | End: 2021-11-23

## 2021-10-05 RX ORDER — ASPIRIN 81 MG/1
TABLET, CHEWABLE ORAL
Qty: 365 TABLET | Refills: 0 | Status: SHIPPED | OUTPATIENT
Start: 2021-10-05

## 2021-10-05 RX ORDER — ALBUTEROL SULFATE 90 UG/1
2 AEROSOL, METERED RESPIRATORY (INHALATION) EVERY 4 HOURS PRN
Qty: 1 EACH | Refills: 5 | Status: SHIPPED | OUTPATIENT
Start: 2021-10-05

## 2021-10-05 RX ORDER — AMITRIPTYLINE HYDROCHLORIDE 10 MG/1
20 TABLET, FILM COATED ORAL NIGHTLY
Qty: 60 TABLET | Refills: 1 | Status: SHIPPED | OUTPATIENT
Start: 2021-10-05 | End: 2022-01-03

## 2021-10-05 RX ORDER — LORAZEPAM 0.5 MG/1
0.5 TABLET ORAL EVERY 8 HOURS PRN
Qty: 90 TABLET | Refills: 2 | Status: SHIPPED | OUTPATIENT
Start: 2021-10-05 | End: 2022-03-09 | Stop reason: SDUPTHER

## 2021-10-05 RX ORDER — BUDESONIDE AND FORMOTEROL FUMARATE DIHYDRATE 160; 4.5 UG/1; UG/1
AEROSOL RESPIRATORY (INHALATION)
Qty: 10.2 G | Refills: 5 | Status: SHIPPED | OUTPATIENT
Start: 2021-10-05

## 2021-10-05 RX ORDER — SERTRALINE HYDROCHLORIDE 100 MG/1
200 TABLET, FILM COATED ORAL DAILY
Qty: 180 TABLET | Refills: 1 | Status: SHIPPED | OUTPATIENT
Start: 2021-10-05 | End: 2022-01-26

## 2021-10-05 RX ORDER — OMEPRAZOLE 40 MG/1
40 CAPSULE, DELAYED RELEASE ORAL
Qty: 30 CAPSULE | Refills: 5 | Status: SHIPPED | OUTPATIENT
Start: 2021-10-05

## 2021-10-05 RX ORDER — OXYBUTYNIN CHLORIDE 5 MG/1
5 TABLET, EXTENDED RELEASE ORAL DAILY
Qty: 90 TABLET | Refills: 1 | Status: SHIPPED | OUTPATIENT
Start: 2021-10-05

## 2021-10-05 SDOH — ECONOMIC STABILITY: FOOD INSECURITY: WITHIN THE PAST 12 MONTHS, YOU WORRIED THAT YOUR FOOD WOULD RUN OUT BEFORE YOU GOT MONEY TO BUY MORE.: NEVER TRUE

## 2021-10-05 SDOH — ECONOMIC STABILITY: FOOD INSECURITY: WITHIN THE PAST 12 MONTHS, THE FOOD YOU BOUGHT JUST DIDN'T LAST AND YOU DIDN'T HAVE MONEY TO GET MORE.: NEVER TRUE

## 2021-10-05 ASSESSMENT — SOCIAL DETERMINANTS OF HEALTH (SDOH): HOW HARD IS IT FOR YOU TO PAY FOR THE VERY BASICS LIKE FOOD, HOUSING, MEDICAL CARE, AND HEATING?: NOT HARD AT ALL

## 2021-10-05 ASSESSMENT — ENCOUNTER SYMPTOMS
COUGH: 1
SHORTNESS OF BREATH: 0
WHEEZING: 1

## 2021-10-05 NOTE — PROGRESS NOTES
[address] [place] [address]  Phone:  [phone]          Name: Brina Nice  : 1965    Chief Complaint   Patient presents with    Medication Refill       HPI:     Brina Nice is a 55 y.o. female who presents today for follow-up of anxiety and depression. Her mood has been \"off the chain\". She used to smoke marijuana to help with anxiety but is unable to now. She had to go to [facility] last year and is still on court-ordered drug classes. She gets drug tested 3x/week. She quit drinking 3 months ago on her own. Her breathing has been good overall. Minimal coughing and wheezing. She has quit smoking but is vaping. She's eating well. Her heartburn has been stable. She just had right trigger finger surgery by Dr. Sidney Maier at [facility] on . She is on Vicodin which controls her pain. Current Outpatient Medications:     budesonide-formoterol (SYMBICORT) 160-4.5 MCG/ACT AERO, inhale 2 puffs by mouth twice a day, Disp: 10.2 g, Rfl: 5    sertraline (ZOLOFT) 100 MG tablet, Take 2 tablets by mouth daily Take 100 mg tablet with 50 mg tablet for 150 mg daily. , Disp: 180 tablet, Rfl: 1    aspirin (RA ASPIRIN ADULT LOW STRENGTH) 81 MG chewable tablet, Chew and swallow 1 tablet by mouth once daily, Disp: 365 tablet, Rfl: 0    oxybutynin (DITROPAN XL) 5 MG extended release tablet, Take 1 tablet by mouth daily, Disp: 90 tablet, Rfl: 1    albuterol sulfate HFA (VENTOLIN HFA) 108 (90 Base) MCG/ACT inhaler, Inhale 2 puffs into the lungs every 4 hours as needed for Wheezing, Disp: 1 each, Rfl: 5    amitriptyline (ELAVIL) 10 MG tablet, Take 2 tablets by mouth nightly, Disp: 60 tablet, Rfl: 1    omeprazole (PRILOSEC) 40 MG delayed release capsule, Take 1 capsule by mouth every morning (before breakfast), Disp: 30 capsule, Rfl: 5    LORazepam (ATIVAN) 0.5 MG tablet, Take 1 tablet by mouth every 8 hours as needed for Anxiety for up to 30 days. , Disp: 90 tablet, Rfl: Behavior: Behavior normal.       Assessment/Plan:     Smiley Barrera was seen today for medication refill. Diagnoses and all orders for this visit:    Chronic bronchitis, unspecified chronic bronchitis type ([address]  -     Her breathing has been stable so will continue on current medications. -     budesonide-formoterol (SYMBICORT) 160-4.5 MCG/ACT AERO; inhale 2 puffs by mouth twice a day  -     albuterol sulfate HFA (VENTOLIN HFA) 108 (90 Base) MCG/ACT inhaler; Inhale 2 puffs into the lungs every 4 hours as needed for Wheezing    Anxiety and depression  -     She has had a lot of anxiety and is contemplating speaking to a physician about medical marijuana. -     sertraline (ZOLOFT) 100 MG tablet; Take 2 tablets by mouth daily Take 100 mg tablet with 50 mg tablet for 150 mg daily.  -     LORazepam (ATIVAN) 0.5 MG tablet; Take 1 tablet by mouth every 8 hours as needed for Anxiety for up to 30 days. Urge incontinence  -     oxybutynin (DITROPAN XL) 5 MG extended release tablet; Take 1 tablet by mouth daily    Chronic daily headache  -     Headaches have been stable overall so will continue on current medication. -     amitriptyline (ELAVIL) 10 MG tablet; Take 2 tablets by mouth nightly    Gastroesophageal reflux disease, unspecified whether esophagitis present  -     omeprazole (PRILOSEC) 40 MG delayed release capsule; Take 1 capsule by mouth every morning (before breakfast)      Return in about 3 months (around 1/5/2022) for anxiety, depression.     Electronically signed by Cy Oro MD on 10/5/2021 at 2:48 PM

## 2021-10-05 NOTE — TELEPHONE ENCOUNTER
Natalie ELLIS \[facility] [place] [facility]\""s [facility] Clinical Support Pool  Subject: Refill Request     QUESTIONS   Name of Medication? amitriptyline (ELAVIL) 10 MG tablet   Patient-reported dosage and instructions? 10mg   How many days do you have left? 0   Preferred Pharmacy? [id number] phone number (if available)? [phone]   Additional Information for Provider? Pt states has been out for two days   and need this refilled . ---------------------------------------------------------------------------   --------------   Lizy MCGOWAN   What is the best way for the office to contact you? OK to leave message on   voicemail   Preferred Call Back Phone Number?  [phone]

## 2021-11-22 NOTE — PROGRESS NOTES
[address]  [address] [place] [address]  Phone:  [phone]          Name: Diane Bucio  : 1965    Chief Complaint   Patient presents with    Foot Pain     bilat heel pain  x2 weeks        HPI:     Diane Bucio is a 55 y.o. female who presents today for evaluation of bilateral heel pain. It began 2 weeks ago with no inciting injury. It is worse with standing on it. It does not hurt when she first stands up. No associated numbness or tingling. She's been taking Tylenol and Aleve without relief. [facility] her with mood swings. She has 18 more court-appointed classes. Current Outpatient Medications:     LORazepam (ATIVAN) 0.5 MG tablet, TAKE 1 TABLET BY MOUTH EVERY 8 HOURS AS NEEDED FOR ANXIETY, Disp: , Rfl:     predniSONE (DELTASONE) 20 MG tablet, Take 2 tablets by mouth daily, Disp: 10 tablet, Rfl: 0    amitriptyline (ELAVIL) 10 MG tablet, Take 2 tablets by mouth nightly, Disp: 60 tablet, Rfl: 1    budesonide-formoterol (SYMBICORT) 160-4.5 MCG/ACT AERO, inhale 2 puffs by mouth twice a day, Disp: 10.2 g, Rfl: 5    sertraline (ZOLOFT) 100 MG tablet, Take 2 tablets by mouth daily Take 100 mg tablet with 50 mg tablet for 150 mg daily. , Disp: 180 tablet, Rfl: 1    aspirin (RA ASPIRIN ADULT LOW STRENGTH) 81 MG chewable tablet, Chew and swallow 1 tablet by mouth once daily, Disp: 365 tablet, Rfl: 0    oxybutynin (DITROPAN XL) 5 MG extended release tablet, Take 1 tablet by mouth daily, Disp: 90 tablet, Rfl: 1    albuterol sulfate HFA (VENTOLIN HFA) 108 (90 Base) MCG/ACT inhaler, Inhale 2 puffs into the lungs every 4 hours as needed for Wheezing, Disp: 1 each, Rfl: 5    omeprazole (PRILOSEC) 40 MG delayed release capsule, Take 1 capsule by mouth every morning (before breakfast), Disp: 30 capsule, Rfl: 5    Multiple Vitamins-Minerals (RA ONE DAILY MAXIMUM) TABS, take 1 tablet by mouth once daily, Disp: 30 tablet, Rfl: 3    Omega-3 Fatty Acids (FISH OIL) 1000 MG CAPS, Take 3,000 mg by mouth 3 times daily, Disp: , Rfl:     Ascorbic Acid (STEPH-C PO), Take by mouth daily, Disp: , Rfl:     mirtazapine (REMERON) 30 MG tablet, Take 1 tablet by mouth nightly, Disp: 90 tablet, Rfl: 1    polyethylene glycol (GLYCOLAX) 17 GM/SCOOP powder, Dispense 238 Gram Bottle. Use as Directed, Disp: 238 g, Rfl: 0    No Known Allergies    Subjective:      Review of Systems   Musculoskeletal: Positive for arthralgias, gait problem and myalgias. Objective:     /70 (Site: Left Upper Arm, Position: Sitting, Cuff Size: Medium Adult)   Pulse 68   Temp 97.1 °F (36.2 °C) (Temporal)   Resp 16   Ht 5' 4.02\" (1.626 m)   Wt 145 lb (65.8 kg)   BMI 24.88 kg/m²     Physical Exam  Vitals and nursing note reviewed. Constitutional:       General: She is not in acute distress. Appearance: She is well-developed. HENT:      Head: Normocephalic and atraumatic. Nose: Nose normal.   Eyes:      Conjunctiva/sclera: Conjunctivae normal.   Cardiovascular:      Rate and Rhythm: Normal rate and regular rhythm. Heart sounds: Normal heart sounds. Pulmonary:      Effort: Pulmonary effort is normal. No respiratory distress. Breath sounds: Normal breath sounds. No wheezing. Musculoskeletal:      Cervical back: Normal range of motion and neck supple. Feet:    Skin:     General: Skin is warm and dry. Neurological:      Mental Status: She is alert and oriented to person, place, and time. Psychiatric:         Behavior: Behavior normal.       Assessment/Plan:     Aracelis Dumont was seen today for foot pain. 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 -      predniSONE (DELTASONE) 20 MG tablet; Take 2 tablets by mouth daily      Return if symptoms worsen or fail to improve.     Electronically signed by Denise Kline MD on 11/23/2021 at 4:06 PM

## 2021-11-23 ENCOUNTER — OFFICE VISIT (OUTPATIENT)
Dept: FAMILY MEDICINE CLINIC | Age: 56
End: 2021-11-23
Payer: MEDICARE

## 2021-11-23 VITALS
HEART RATE: 68 BPM | DIASTOLIC BLOOD PRESSURE: 70 MMHG | WEIGHT: 145 LBS | HEIGHT: 64 IN | SYSTOLIC BLOOD PRESSURE: 108 MMHG | RESPIRATION RATE: 16 BRPM | TEMPERATURE: 97.1 F | BODY MASS INDEX: 24.75 KG/M2

## 2021-11-23 DIAGNOSIS — M79.671 PAIN OF BOTH HEELS: Primary | ICD-10-CM

## 2021-11-23 DIAGNOSIS — M79.672 PAIN OF BOTH HEELS: Primary | ICD-10-CM

## 2021-11-23 PROCEDURE — G8484 FLU IMMUNIZE NO ADMIN: HCPCS | Performed by: FAMILY MEDICINE

## 2021-11-23 PROCEDURE — 1036F TOBACCO NON-USER: CPT | Performed by: FAMILY MEDICINE

## 2021-11-23 PROCEDURE — G8420 CALC BMI NORM PARAMETERS: HCPCS | Performed by: FAMILY MEDICINE

## 2021-11-23 PROCEDURE — 3017F COLORECTAL CA SCREEN DOC REV: CPT | Performed by: FAMILY MEDICINE

## 2021-11-23 PROCEDURE — G8427 DOCREV CUR MEDS BY ELIG CLIN: HCPCS | Performed by: FAMILY MEDICINE

## 2021-11-23 PROCEDURE — 99213 OFFICE O/P EST LOW 20 MIN: CPT | Performed by: FAMILY MEDICINE

## 2021-11-23 RX ORDER — LORAZEPAM 0.5 MG/1
TABLET ORAL
COMMUNITY
Start: 2021-11-19 | End: 2022-06-21

## 2021-11-23 RX ORDER — PREDNISONE 20 MG/1
40 TABLET ORAL DAILY
Qty: 10 TABLET | Refills: 0 | Status: SHIPPED | OUTPATIENT
Start: 2021-11-23 | End: 2022-01-22 | Stop reason: ALTCHOICE

## 2021-11-23 NOTE — PATIENT INSTRUCTIONS
Patient Education        Foot Arthritis: Exercises  Introduction  Here are some examples of exercises for you to try. The exercises may be suggested for a condition or for rehabilitation. Start each exercise slowly. Ease off the exercises if you start to have pain. You will be told when to start these exercises and which ones will work best for you. How to do the exercises  Calf stretch (knees straight)    1. Place a book on the floor a few inches from a wall or countertop, and put the balls of your feet on it. Your heels should be on the floor. The book needs to be thick enough so that you can feel a gentle stretch in your calf. If you are not steady on your feet, hold on to a chair, counter, or wall while you do this stretch. 2. Keep your knees straight, and lean forward until you feel a stretch in your calf. 3. To get more stretch, add another book or use a thicker book, such as a phone book, a dictionary, or an encyclopedia. 4. Hold the stretch for at least 15 to 30 seconds. 5. Repeat 2 to 4 times. Calf stretch (knees bent)    1. Place a book on the floor a few inches from a wall or countertop, and put the balls of your feet on it. Your heels should be on the floor. The book needs to be thick enough so that you can feel a gentle stretch in your calf. If you are not steady on your feet, hold on to a chair, counter, or wall while you do this stretch. 2. Bend your knees, and lean forward until you feel a stretch in your calf. 3. To get more stretch, add another book or use a thicker book, such as a phone book, a dictionary, or an encyclopedia. 4. Hold the stretch for at least 15 to 30 seconds. 5. Repeat 2 to 4 times. Great toe extension stretch    1. Sit in a chair, and put your affected foot across your other knee. 2. Grasp your heel with one hand and then slowly pull your big toe back with your other hand.  Pull your toe back toward your ankle until you feel a stretch along the bottom of your foot.  3. Hold the stretch for at least 15 to 30 seconds. 4. Repeat 2 to 4 times. 5. Switch feet and repeat steps 1 through 4, even if only one foot is sore. Great toe flexion stretch    1. Sit in a chair, and put your affected foot across your other knee. 2. Grasp your heel with one hand and then slowly push your big toe down with your other hand. Push your toe down and away from your ankle until you feel a stretch along the top of your foot. 3. Hold the stretch for at least 15 to 30 seconds. 4. Repeat 2 to 4 times. 5. Switch feet and repeat steps 1 through 4, even if only one foot is sore. Ankle alphabet    1. Sit in a chair with your feet flat on the floor. (You can also do this exercise lying on your back with your affected leg propped up on a pillow). 2. Lift the heel of your sore foot off the floor, and slowly trace the letters of the alphabet. 3. Switch feet and repeat steps 1 through 2, even if only one foot is sore. Resisted ankle dorsiflexion    1. Tie the ends of an exercise band together to form a loop. Attach one end of the loop to a secure object or shut a door on it to hold it in place. (Or you can have someone hold one end of the loop to provide resistance.)  2. While sitting on the floor or in a chair, loop the other end of the band over the top of your affected foot. 3. Keeping your knee and leg straight, slowly flex your foot to pull back on the exercise band, and then slowly relax. 4. Repeat 8 to 12 times. 5. Switch feet and repeat steps 1 through 4, even if only one foot is sore. Towel curl    1. While sitting, place your affected foot on a towel on the floor, and scrunch the towel toward you with your toes. 2. Then use your toes to push the towel away from you. 3. Repeat 8 to 12 times. 4. Switch feet and repeat steps 1 through 3, even if only one foot is sore. Resisted ankle eversion    1. Sit on the floor with your legs straight.   2. Hold both ends of an exercise band and loop the band around the outside of your affected foot. Then press your other foot against the band. 3. Keeping your leg straight, slowly push your affected foot outward against the band and away from your other foot without letting your leg rotate. Then slowly relax. 4. Repeat 8 to 12 times. 5. Switch feet and repeat steps 1 through 4, even if only one foot is sore. Resisted ankle inversion    1. Sit on the floor with your good leg crossed over your other leg. 2. Hold both ends of an exercise band and loop the band around the inside of your affected foot. Then press your other foot against the band. 3. Keeping your legs crossed, slowly push your affected foot against the band so that foot moves away from your other foot. Then slowly relax. 4. Repeat 8 to 12 times. 5. Switch feet and repeat steps 1 through 4, even if only one foot is sore. Follow-up care is a key part of your treatment and safety. Be sure to make and go to all appointments, and call your doctor if you are having problems. It's also a good idea to know your test results and keep a list of the medicines you take. Where can you learn more? Go to https://[employer]. org and sign in to your [employer] account. Enter K113 in the [employer] box to learn more about \"Foot Arthritis: Exercises. \"     If you do not have an account, please click on the \"Sign Up Now\" link. Current as of: July 1, 2021               Content Version: 13.0  © 2006-2021 Healthwise, Incorporated. Care instructions adapted under license by [facility] ([facility]). If you have questions about a medical condition or this instruction, always ask your healthcare professional. [place] [address] any warranty or liability for your use of this information.

## 2021-12-02 ENCOUNTER — TELEPHONE (OUTPATIENT)
Dept: FAMILY MEDICINE CLINIC | Age: 56
End: 2021-12-02

## 2021-12-02 NOTE — TELEPHONE ENCOUNTER
----- Message from Santana Page sent at 12/2/2021  3:46 PM EST -----  Subject: Message to Provider    QUESTIONS  Information for Provider? Pt of Dr. Amy Cote? medication is prednisone for   5 days didnt help Please call pt @ [phone]  ---------------------------------------------------------------------------  --------------  CALL BACK INFO  What is the best way for the office to contact you? OK to leave message on   voicemail  Preferred Call Back Phone Number? [phone]  ---------------------------------------------------------------------------  --------------  SCRIPT ANSWERS  Relationship to Patient?  Self

## 2021-12-03 DIAGNOSIS — M79.672 PAIN OF BOTH HEELS: Primary | ICD-10-CM

## 2021-12-03 DIAGNOSIS — M79.671 PAIN OF BOTH HEELS: Primary | ICD-10-CM

## 2021-12-03 NOTE — TELEPHONE ENCOUNTER
Pt declined podiatry because she was not sure if medicaid would cover this. She is asking for xrays.

## 2022-01-03 DIAGNOSIS — R51.9 CHRONIC DAILY HEADACHE: ICD-10-CM

## 2022-01-03 RX ORDER — AMITRIPTYLINE HYDROCHLORIDE 10 MG/1
TABLET, FILM COATED ORAL
Qty: 60 TABLET | Refills: 1 | Status: SHIPPED | OUTPATIENT
Start: 2022-01-03 | End: 2022-04-05

## 2022-01-14 DIAGNOSIS — F51.04 PSYCHOPHYSIOLOGICAL INSOMNIA: ICD-10-CM

## 2022-01-14 RX ORDER — MIRTAZAPINE 30 MG/1
TABLET, FILM COATED ORAL
Qty: 90 TABLET | Refills: 1 | Status: SHIPPED | OUTPATIENT
Start: 2022-01-14 | End: 2022-07-21 | Stop reason: SDUPTHER

## 2022-01-14 NOTE — TELEPHONE ENCOUNTER
----- Message from Elliot Golden sent at 1/14/2022 10:21 AM EST -----  Subject: Refill Request    QUESTIONS  Name of Medication? mirtazapine (REMERON) 30 MG tablet  Patient-reported dosage and instructions? mirtazapine (REMERON) 30 MG   table and Take 1 tablet by mouth nightly  How many days do you have left? 2  Preferred Pharmacy? [facility] [id number] [facility] phone number (if available)? [phone]  ---------------------------------------------------------------------------  --------------  CALL BACK INFO  What is the best way for the office to contact you? OK to leave message on   voicemail  Preferred Call Back Phone Number?  [phone]

## 2022-01-22 ENCOUNTER — HOSPITAL ENCOUNTER (EMERGENCY)
Age: 57
Discharge: HOME | End: 2022-01-22
Payer: MEDICARE

## 2022-01-22 VITALS
HEIGHT: 64 IN | DIASTOLIC BLOOD PRESSURE: 61 MMHG | BODY MASS INDEX: 24.75 KG/M2 | WEIGHT: 145 LBS | OXYGEN SATURATION: 97 % | RESPIRATION RATE: 18 BRPM | TEMPERATURE: 98 F | HEART RATE: 92 BPM | SYSTOLIC BLOOD PRESSURE: 100 MMHG

## 2022-01-22 DIAGNOSIS — U07.1 COVID-19: Primary | ICD-10-CM

## 2022-01-22 LAB — SARS-COV-2, NAA: DETECTED

## 2022-01-22 PROCEDURE — 99213 OFFICE O/P EST LOW 20 MIN: CPT

## 2022-01-22 PROCEDURE — 87635 SARS-COV-2 COVID-19 AMP PRB: CPT

## 2022-01-22 PROCEDURE — 99213 OFFICE O/P EST LOW 20 MIN: CPT | Performed by: NURSE PRACTITIONER

## 2022-01-22 RX ORDER — PREDNISONE 20 MG/1
20 TABLET ORAL 2 TIMES DAILY
Qty: 10 TABLET | Refills: 0 | Status: SHIPPED | OUTPATIENT
Start: 2022-01-22 | End: 2022-01-27

## 2022-01-22 RX ORDER — CETIRIZINE HYDROCHLORIDE 10 MG/1
10 TABLET ORAL DAILY
Qty: 30 TABLET | Refills: 0 | Status: SHIPPED | OUTPATIENT
Start: 2022-01-22 | End: 2022-02-21

## 2022-01-22 RX ORDER — BENZONATATE 200 MG/1
200 CAPSULE ORAL 3 TIMES DAILY PRN
Qty: 30 CAPSULE | Refills: 0 | Status: SHIPPED | OUTPATIENT
Start: 2022-01-22 | End: 2022-01-29

## 2022-01-22 ASSESSMENT — ENCOUNTER SYMPTOMS
RHINORRHEA: 1
SHORTNESS OF BREATH: 0
VOMITING: 0
COUGH: 1
DIARRHEA: 1
CHEST TIGHTNESS: 0
SORE THROAT: 1
NAUSEA: 0

## 2022-01-22 ASSESSMENT — PAIN DESCRIPTION - PROGRESSION: CLINICAL_PROGRESSION: GRADUALLY WORSENING

## 2022-01-22 ASSESSMENT — PAIN SCALES - GENERAL: PAINLEVEL_OUTOF10: 10

## 2022-01-22 ASSESSMENT — PAIN DESCRIPTION - FREQUENCY: FREQUENCY: CONTINUOUS

## 2022-01-22 ASSESSMENT — PAIN - FUNCTIONAL ASSESSMENT: PAIN_FUNCTIONAL_ASSESSMENT: PREVENTS OR INTERFERES SOME ACTIVE ACTIVITIES AND ADLS

## 2022-01-22 ASSESSMENT — PAIN DESCRIPTION - DESCRIPTORS: DESCRIPTORS: ACHING;THROBBING

## 2022-01-22 ASSESSMENT — PAIN DESCRIPTION - PAIN TYPE: TYPE: ACUTE PAIN

## 2022-01-22 ASSESSMENT — PAIN DESCRIPTION - ONSET: ONSET: GRADUAL

## 2022-01-22 ASSESSMENT — PAIN DESCRIPTION - LOCATION: LOCATION: HEAD

## 2022-01-22 NOTE — ED PROVIDER NOTES
[address]  Urgent Care Encounter       CHIEF COMPLAINT       Chief Complaint   Patient presents with    Nasal Congestion    Headache       Nurses Notes reviewed and I agree except as noted in the HPI. HISTORY OF PRESENT ILLNESS   Priya Sams is a 55 y.o. female who presents to the [facility] care center for evaluation of headache and nasal congestion. She reports that her symptoms started Thursday. She denies known exposure to someone ill, but reports that she works in [employer]. She reports her symptoms as chills, nasal congestion, rhinorrhea, pharyngitis, diarrhea, nonproductive cough, fatigue, generalized body aches, and headache. She denies fever or postnasal drainage. She denies nausea and vomiting. She denies dyspnea and loss of taste or smell. She denies night sweats or dizziness. She denies getting the COVID-19 vaccine and reports that she had COVID roughly 1 year ago in October. The history is provided by the patient. No  was used. REVIEW OF SYSTEMS     Review of Systems   Constitutional: Positive for chills and fatigue. Negative for activity change, appetite change and fever. HENT: Positive for congestion, rhinorrhea and sore throat. Negative for ear discharge and ear pain. Respiratory: Positive for cough. Negative for chest tightness and shortness of breath. Cardiovascular: Negative for chest pain. Gastrointestinal: Positive for diarrhea. Negative for nausea and vomiting. Genitourinary: Negative for dysuria. Musculoskeletal: Positive for myalgias. Skin: Negative for rash. Allergic/Immunologic: Negative for environmental allergies and food allergies. Neurological: Positive for headaches. Negative for dizziness.        PAST MEDICAL HISTORY         Diagnosis Date    [facility]ch[facility] obstructive [facility] disease) ([facility] [address] [facility]     GERD (gastroesophageal reflux disease)     Schizoaffective disorder, bipolar type with good prognostic features ([facility] [address]     TIA (transient ischemic attack)        SURGICALHISTORY     Patient  has a past surgical history that includes Tonsillectomy; Tubal ligation; Appendectomy; Carpal tunnel release (Right, 03/2019); and pr bx of breast,vacuum asst,image guide (Left). CURRENT MEDICATIONS       Previous Medications    ALBUTEROL SULFATE HFA (VENTOLIN HFA) 108 (90 BASE) MCG/ACT INHALER    Inhale 2 puffs into the lungs every 4 hours as needed for Wheezing    AMITRIPTYLINE (ELAVIL) 10 MG TABLET    take 2 tablets by mouth every evening    ASCORBIC ACID (STEPH-C PO)    Take by mouth daily    ASPIRIN (RA ASPIRIN ADULT LOW STRENGTH) 81 MG CHEWABLE TABLET    Chew and swallow 1 tablet by mouth once daily    BUDESONIDE-FORMOTEROL (SYMBICORT) 160-4.5 MCG/ACT AERO    inhale 2 puffs by mouth twice a day    LORAZEPAM (ATIVAN) 0.5 MG TABLET    TAKE 1 TABLET BY MOUTH EVERY 8 HOURS AS NEEDED FOR ANXIETY    MIRTAZAPINE (REMERON) 30 MG TABLET    take 1 tablet by mouth every evening    MULTIPLE VITAMINS-MINERALS (RA ONE DAILY MAXIMUM) TABS    take 1 tablet by mouth once daily    OMEGA-3 FATTY ACIDS (FISH OIL) 1000 MG CAPS    Take 3,000 mg by mouth 3 times daily    OMEPRAZOLE (PRILOSEC) 40 MG DELAYED RELEASE CAPSULE    Take 1 capsule by mouth every morning (before breakfast)    OXYBUTYNIN (DITROPAN XL) 5 MG EXTENDED RELEASE TABLET    Take 1 tablet by mouth daily    PREDNISONE (DELTASONE) 20 MG TABLET    Take 2 tablets by mouth daily    SERTRALINE (ZOLOFT) 100 MG TABLET    Take 2 tablets by mouth daily Take 100 mg tablet with 50 mg tablet for 150 mg daily. ALLERGIES     Patient is has No Known Allergies.     Patients   Immunization History   Administered Date(s) Administered    Influenza Virus Vaccine 09/27/2015, 11/06/2017    Influenza, Quadv, 6 mo and older, IM (Fluzone, Flulaval) 09/28/2018    Influenza, Quadv, IM, (6 mo and older Fluzone, Flulaval, Fluarix and 3 yrs and older Afluria) 11/04/2016       FAMILY HISTORY Patient's family history includes Brain Cancer in her mother; Breast Cancer in her maternal cousin; Cirrhosis in her maternal uncle; Dementia in her father; Kory Tamela in her mother. SOCIAL HISTORY     Patient  reports that she quit smoking about 10 months ago. Her smoking use included cigarettes. She has a 37.00 pack-year smoking history. She has never used smokeless tobacco. She reports previous alcohol use. She reports that she does not use drugs. PHYSICAL EXAM     ED TRIAGE VITALS  BP: 100/61, Temp: 98 °F (36.7 °C), Pulse: 92, Resp: 18, SpO2: 97 %,Estimated body mass index is 24.89 kg/m² as calculated from the following:    Height as of this encounter: 5' 4\" (1.626 m). Weight as of this encounter: 145 lb (65.8 kg). ,No LMP recorded. Patient is postmenopausal.    Physical Exam  Vitals and nursing note reviewed. Constitutional:       General: She is not in acute distress. Appearance: Normal appearance. She is not ill-appearing, toxic-appearing or diaphoretic. HENT:      Head: Normocephalic. Right Ear: Ear canal and external ear normal.      Left Ear: Ear canal and external ear normal.      Nose: Nose normal. No congestion or rhinorrhea. Mouth/Throat:      Mouth: Mucous membranes are moist.      Pharynx: Oropharynx is clear. No oropharyngeal exudate or posterior oropharyngeal erythema. Cardiovascular:      Rate and Rhythm: Normal rate. Pulses: Normal pulses. Pulmonary:      Effort: Pulmonary effort is normal. No respiratory distress. Breath sounds: No stridor. No wheezing or rhonchi. Abdominal:      General: Abdomen is flat. Bowel sounds are normal.      Palpations: Abdomen is soft. Musculoskeletal:         General: No swelling or tenderness. Normal range of motion. Cervical back: Normal range of motion. Neurological:      General: No focal deficit present. Mental Status: She is alert and oriented to person, place, and time.    Psychiatric:         Mood and Affect: Mood normal.         Behavior: Behavior normal.         DIAGNOSTIC RESULTS     Labs:  Results for orders placed or performed during the hospital encounter of 01/22/22   COVID-19, Rapid   Result Value Ref Range    SARS-CoV-2, JOSTIN DETECTED (AA) NOT DETECTED       IMAGING:    No orders to display         EKG: None      URGENT CARE COURSE:     Vitals:    01/22/22 1409   BP: 100/61   Pulse: 92   Resp: 18   Temp: 98 °F (36.7 °C)   TempSrc: Temporal   SpO2: 97%   Weight: 145 lb (65.8 kg)   Height: 5' 4\" (1.626 m)       Medications - No data to display         PROCEDURES:  None    FINAL IMPRESSION      1. COVID-19          DISPOSITION/ PLAN     Patient seen and evaluated for the above symptoms. A rapid Covid swab was obtained and positive. He is instructed to isolate for 5 days from symptom onset. He is instructed to return to work on day 6 if symptoms are manageable and he is afebrile. He is instructed to wear a face covering for the next 5 days. He is provided a work note for 2 days with the current CDC recommendations for isolation. He is instructed to use over-the-counter Tylenol and Motrin for pain or fever. He is instructed to use zinc, vitamin C, vitamin D3, and Pepcid. He is instructed to use Mucinex D, Flonase, and Zyrtec for symptom relief. He is instructed to follow-up with his PCP in 3 to 5 days with new or worsening symptoms. He is agreeable with the above plan and denies questions or concerns at this time. PATIENT REFERRED TO:  Kayli Dougherty MD  [facility] / [place] [id number]      DISCHARGE MEDICATIONS:  New Prescriptions    No medications on file       Discontinued Medications    POLYETHYLENE GLYCOL (GLYCOLAX) 17 GM/SCOOP POWDER    Dispense 238 Gram Bottle.   Use as Directed       Current Discharge Medication List          KEITH Santiago CNP    (Please note that portions of this note were completed with a voice recognition program. Efforts were made to edit the dictations but occasionally words are mis-transcribed.)           KEITH Perez - CNP  01/22/22 1500

## 2022-01-22 NOTE — Clinical Note
Marcell Denotn was seen and treated in our emergency department on 1/22/2022. She may return to work on 01/24/2022. May be off work 2 days, CDC recommendation of isolation for 5 days then can return to work if symptoms are improving or resolved with a mask. If you have any questions or concerns, please don't hesitate to call.       Сергей Jones, APRN - CNP

## 2022-01-24 ENCOUNTER — TELEPHONE (OUTPATIENT)
Dept: FAMILY MEDICINE CLINIC | Age: 57
End: 2022-01-24

## 2022-01-26 ENCOUNTER — VIRTUAL VISIT (OUTPATIENT)
Dept: FAMILY MEDICINE CLINIC | Age: 57
End: 2022-01-26
Payer: MEDICARE

## 2022-01-26 DIAGNOSIS — U07.1 COVID-19: Primary | ICD-10-CM

## 2022-01-26 DIAGNOSIS — F25.0 SCHIZOAFFECTIVE DISORDER, BIPOLAR TYPE WITH GOOD PROGNOSTIC FEATURES (HCC): ICD-10-CM

## 2022-01-26 DIAGNOSIS — J42 CHRONIC BRONCHITIS, UNSPECIFIED CHRONIC BRONCHITIS TYPE (HCC): ICD-10-CM

## 2022-01-26 PROCEDURE — G8428 CUR MEDS NOT DOCUMENT: HCPCS | Performed by: FAMILY MEDICINE

## 2022-01-26 PROCEDURE — 99214 OFFICE O/P EST MOD 30 MIN: CPT | Performed by: FAMILY MEDICINE

## 2022-01-26 PROCEDURE — 3017F COLORECTAL CA SCREEN DOC REV: CPT | Performed by: FAMILY MEDICINE

## 2022-01-26 RX ORDER — LAMOTRIGINE 25 MG/1
25 TABLET ORAL DAILY
Qty: 30 TABLET | Refills: 1 | Status: SHIPPED | OUTPATIENT
Start: 2022-01-26 | End: 2022-03-07 | Stop reason: SDUPTHER

## 2022-01-26 ASSESSMENT — ENCOUNTER SYMPTOMS
SHORTNESS OF BREATH: 0
COUGH: 1

## 2022-01-26 NOTE — PROGRESS NOTES
[address]  [address] [place] [address]  Phone:  [phone]       2022    TELEHEALTH EVALUATION -- Audio/Visual (During AZQ- public health emergency)    HPI:    Jim Julien (:  1965) has requested an audio/video evaluation for the following concern(s):  Paul Christina tested positive for COVID-19 on 22 after starting with a migraine, cough, and nasal congestion 2 days prior. She was started on steroids for 5 days. She went back to work yesterday with a mask. Her cough is improving. She's not SOB. No chest pain, palpitations, or tachycardia. She is seeing a psychologist Dr. Beulah Heart for her anxiety and depression. She stopped taking her Zoloft because she felt it isn't helping. He recommended a mood stabilizer and she thinks she needs it. No SI or HI. Review of Systems   Constitutional: Negative for chills and fever. Respiratory: Positive for cough. Negative for shortness of breath. Neurological: Positive for headaches. Psychiatric/Behavioral: Positive for agitation and dysphoric mood. Negative for self-injury and suicidal ideas. The patient is nervous/anxious. Prior to Visit Medications    Medication Sig Taking?  Authorizing Provider   lamoTRIgine (LAMICTAL) 25 MG tablet Take 1 tablet by mouth daily Yes Cara Burton MD   cetirizine (ZYRTEC) 10 MG tablet Take 1 tablet by mouth daily  Minoo File, APRN - CNP   benzonatate (TESSALON) 200 MG capsule Take 1 capsule by mouth 3 times daily as needed for Cough  Minoo File, APRN - CNP   predniSONE (DELTASONE) 20 MG tablet Take 1 tablet by mouth 2 times daily for 5 days  Minoo File, APRN - CNP   mirtazapine (REMERON) 30 MG tablet take 1 tablet by mouth every evening  Cara Burton MD   amitriptyline (ELAVIL) 10 MG tablet take 2 tablets by mouth every evening  Cara Burton MD   LORazepam (ATIVAN) 0.5 MG tablet TAKE 1 TABLET BY MOUTH EVERY 8 HOURS AS NEEDED FOR ANXIETY  Historical Provider, MD   budesonide-formoterol (SYMBICORT) 160-4.5 MCG/ACT AERO inhale 2 puffs by mouth twice a day  Anita Vance MD   aspirin (RA ASPIRIN ADULT LOW STRENGTH) 81 MG chewable tablet Chew and swallow 1 tablet by mouth once daily  Anita Vance MD   oxybutynin (DITROPAN XL) 5 MG extended release tablet Take 1 tablet by mouth daily  Anita Vance MD   albuterol sulfate HFA (VENTOLIN HFA) 108 (90 Base) MCG/ACT inhaler Inhale 2 puffs into the lungs every 4 hours as needed for Wheezing  Anita Vance MD   omeprazole (PRILOSEC) 40 MG delayed release capsule Take 1 capsule by mouth every morning (before breakfast)  Anita Vance MD   Multiple Vitamins-Minerals (RA ONE DAILY MAXIMUM) TABS take 1 tablet by mouth once daily  Anita Vance MD   Omega-3 Fatty Acids (FISH OIL) 1000 MG CAPS Take 3,000 mg by mouth 3 times daily  Historical Provider, MD   Ascorbic Acid (STEPH-C PO) Take by mouth daily  Historical Provider, MD       Social History     Tobacco Use    Smoking status: Former Smoker     Packs/day: 1.00     Years: 37.00     Pack years: 37.00     Types: Cigarettes     Quit date: 3/1/2021     Years since quittin.9    Smokeless tobacco: Never Used    Tobacco comment: Not interested at this time   Vaping Use    Vaping Use: Every day    Start date: 3/16/2021    Substances: Nicotine   Substance Use Topics    Alcohol use: Not Currently     Comment: social    Drug use: No        No Known Allergies,   Past Medical History:   Diagnosis Date    [facility]) [facility] [address]     GERD (gastroesophageal reflux disease)     Schizoaffective disorder, bipolar type with good prognostic features ([facility] [address]     TIA (transient ischemic attack)        PHYSICAL EXAMINATION:    Constitutional: [x] Appears well-developed and well-nourished [x] No apparent distress      [] Abnormal-   Mental status  [x] Alert and awake  [] Oriented to encounter (During AFQ-25 public health emergency), evaluation of the following organ systems was limited: Vitals/Constitutional/EENT/Resp/CV/GI//MS/Neuro/Skin/Heme-Lymph-Imm. Pursuant to the emergency declaration under the [address] [place] [address], [address] [place] [address] and the [employer] Act, this Virtual Visit was conducted with patient's (and/or legal guardian's) consent, to reduce the patient's risk of exposure to COVID-19 and provide necessary medical care. The patient (and/or legal guardian) has also been advised to contact this office for worsening conditions or problems, and seek emergency medical treatment and/or call 911 if deemed necessary. Patient identification was verified at the start of the visit: Yes    Services were provided through a video synchronous discussion virtually to substitute for in-person clinic visit. Patient and provider were located at their individual homes. --Zeynep Jimenez MD on 1/26/2022 at 10:06 AM    An electronic signature was used to authenticate this note.

## 2022-03-03 ENCOUNTER — TELEPHONE (OUTPATIENT)
Dept: FAMILY MEDICINE CLINIC | Age: 57
End: 2022-03-03

## 2022-03-03 ASSESSMENT — ENCOUNTER SYMPTOMS
COUGH: 1
SHORTNESS OF BREATH: 0

## 2022-03-03 NOTE — TELEPHONE ENCOUNTER
Pt stated she will try other advice that was first given and if she doesn't notice nik improvement in bowels she will call office back.

## 2022-03-03 NOTE — PROGRESS NOTES
[address]   [address]  Phone:  [phone]       3/7/2022    TELEHEALTH EVALUATION -- Audio/Visual (During ZQKZP-72 public health emergency)    HPI:    Estela Gant (:  1965) has requested an audio/video evaluation for the following concern(s):  F/u mood    She is seeing a psychologist Dr. Abiodun Méndez for her anxiety and depression. He recommended a mood stabilizer so last month she was started on Lamictal 25 mg daily. She can't tell a difference in her mood. She's been working a lot so hasn't seen her  much. No SI or HI. Review of Systems   Constitutional: Negative for chills and fever. Respiratory: Positive for cough. Negative for shortness of breath. Neurological: Positive for headaches. Psychiatric/Behavioral: Positive for agitation and dysphoric mood. Negative for self-injury and suicidal ideas. The patient is nervous/anxious. Prior to Visit Medications    Medication Sig Taking?  Authorizing Provider   lamoTRIgine (LAMICTAL) 25 MG tablet Take 2 tablets by mouth daily Yes Megan Smith MD   mirtazapine (REMERON) 30 MG tablet take 1 tablet by mouth every evening  Megan Smith MD   amitriptyline (ELAVIL) 10 MG tablet take 2 tablets by mouth every evening  Megan Smith MD   LORazepam (ATIVAN) 0.5 MG tablet TAKE 1 TABLET BY MOUTH EVERY 8 HOURS AS NEEDED FOR ANXIETY  Historical Provider, MD   budesonide-formoterol (SYMBICORT) 160-4.5 MCG/ACT AERO inhale 2 puffs by mouth twice a day  Megan Smith MD   aspirin (RA ASPIRIN ADULT LOW STRENGTH) 81 MG chewable tablet Chew and swallow 1 tablet by mouth once daily  Megan Smith MD   oxybutynin (DITROPAN XL) 5 MG extended release tablet Take 1 tablet by mouth daily  Megan Smith MD   albuterol sulfate HFA (VENTOLIN HFA) 108 (90 Base) MCG/ACT inhaler Inhale 2 puffs into the lungs every 4 hours as needed for Brian Trujillo MD omeprazole (PRILOSEC) 40 MG delayed release capsule Take 1 capsule by mouth every morning (before breakfast)  Sincere Davies MD   Multiple Vitamins-Minerals (RA ONE DAILY MAXIMUM) TABS take 1 tablet by mouth once daily  Sincere Davies MD   Omega-3 Fatty Acids (FISH OIL) 1000 MG CAPS Take 3,000 mg by mouth 3 times daily  Historical Provider, MD   Ascorbic Acid (STEPH-C PO) Take by mouth daily  Historical Provider, MD       Social History     Tobacco Use    Smoking status: Former Smoker     Packs/day: 1.00     Years: 37.00     Pack years: 37.00     Types: Cigarettes     Quit date: 3/1/2021     Years since quittin.0    Smokeless tobacco: Never Used    Tobacco comment: Not interested at this time   Vaping Use    Vaping Use: Every day    Start date: 3/16/2021    Substances: Nicotine   Substance Use Topics    Alcohol use: Not Currently     Comment: social    Drug use: No        No Known Allergies,   Past Medical History:   Diagnosis Date    [facility]     GERD (gastroesophageal reflux disease)     Schizoaffective disorder, bipolar type with good prognostic features ([facility]     TIA (transient ischemic attack)        PHYSICAL EXAMINATION:    Constitutional: [x] Appears well-developed and well-nourished [x] No apparent distress      [] Abnormal-   Mental status  [x] Alert and awake  [] Oriented to person/place/time []Able to follow commands      Eyes:  EOM    [x]  Normal  [] Abnormal-  Sclera  [x]  Normal  [] Abnormal -         Discharge [x]  None visible  [] Abnormal -    HENT:   [] Normocephalic, atraumatic.   [] Abnormal   [] Mouth/Throat: Mucous membranes are moist.     External Ears [] Normal  [] Abnormal-     Neck: [] No visualized mass     Pulmonary/Chest: [x] Respiratory effort normal.  [x] No visualized signs of difficulty breathing or respiratory distress        [] Abnormal-      Musculoskeletal:   [] Normal gait with no signs of ataxia         [] Normal range of motion of neck        [] Abnormal-       Neurological:        [] No Facial Asymmetry (Cranial nerve 7 motor function) (limited exam to video visit)          [] No gaze palsy        [] Abnormal-         Skin:        [] No significant exanthematous lesions or discoloration noted on facial skin         [] Abnormal-            Psychiatric:       [] Normal Affect [] No Hallucinations        [] Abnormal-       ASSESSMENT/PLAN:  Diagnoses and all orders for this visit:    Schizoaffective disorder, bipolar type with good prognostic features ([facility]  Psychophysiological insomnia  -     She can't tell much of a difference with the Lamictal so will increase from 25 mg to 50 mg daily. She was encouraged to contact our office with worsening mood or medication side effects.  -     lamoTRIgine (LAMICTAL) 25 MG tablet; Take 2 tablets by mouth daily      Return in about 1 month (around 4/7/2022) for bipolarEduardo eGe is a 55 y.o. female being evaluated by a Virtual Visit (video visit) encounter to address concerns as mentioned above. A caregiver was present when appropriate. Due to this being a TeleHealth encounter (During RSWZ-85 public health emergency), evaluation of the following organ systems was limited: Vitals/Constitutional/EENT/Resp/CV/GI//MS/Neuro/Skin/Heme-Lymph-Imm. Pursuant to the emergency declaration under the [address] [place] [address], [address] [place] [address] authority and the [employer] Act, this Virtual Visit was conducted with patient's (and/or legal guardian's) consent, to reduce the patient's risk of exposure to COVID-19 and provide necessary medical care. The patient (and/or legal guardian) has also been advised to contact this office for worsening conditions or problems, and seek emergency medical treatment and/or call 911 if deemed necessary.      Patient identification was verified at the start of the visit: Yes    Services were provided through a video synchronous discussion virtually to substitute for in-person clinic visit. Patient and provider were located at their individual homes. --Elba Coffey MD on 3/7/2022 at 4:18 PM    An electronic signature was used to authenticate this note.

## 2022-03-03 NOTE — TELEPHONE ENCOUNTER
Please get more info on this. OK to advise Miralax daily plus Milk of Magnesia 1-2 times daily as needed. Increase fluid and fiber intake.

## 2022-03-03 NOTE — TELEPHONE ENCOUNTER
Spoke with pt who stated she has had constipation for about 1 week. Pt took laxatives last week and was able to have a single bm. Pt described her bm as hard and rabbit sized. Pt has been pushing water, drinking coffee as normal and eating salad. Pt stated nothing is normal and she is miserable. Pt has not been having any abdominal pain or cramping other than when she took the laxatives. Pt aware of advice given from pcp.

## 2022-03-03 NOTE — TELEPHONE ENCOUNTER
----- Message from Yazmin Daniel sent at 3/3/2022  9:31 AM EST -----  Subject: Message to Provider    QUESTIONS  Information for Provider? Pt would like a call back in regards to her   constipation. Pt took three laxatives the other day, went to the restroom   a little bit but is still not regular or moving her bowels regularly. ---------------------------------------------------------------------------  --------------  Cece MCGOWAN  What is the best way for the office to contact you? OK to leave message on   voicemail  Preferred Call Back Phone Number? [phone]  ---------------------------------------------------------------------------  --------------  SCRIPT ANSWERS  Relationship to Patient?  Self

## 2022-03-07 ENCOUNTER — TELEMEDICINE (OUTPATIENT)
Dept: FAMILY MEDICINE CLINIC | Age: 57
End: 2022-03-07
Payer: MEDICARE

## 2022-03-07 DIAGNOSIS — F25.0 SCHIZOAFFECTIVE DISORDER, BIPOLAR TYPE WITH GOOD PROGNOSTIC FEATURES (HCC): Primary | ICD-10-CM

## 2022-03-07 DIAGNOSIS — F51.04 PSYCHOPHYSIOLOGICAL INSOMNIA: ICD-10-CM

## 2022-03-07 PROCEDURE — G8427 DOCREV CUR MEDS BY ELIG CLIN: HCPCS | Performed by: FAMILY MEDICINE

## 2022-03-07 PROCEDURE — 3017F COLORECTAL CA SCREEN DOC REV: CPT | Performed by: FAMILY MEDICINE

## 2022-03-07 PROCEDURE — 99213 OFFICE O/P EST LOW 20 MIN: CPT | Performed by: FAMILY MEDICINE

## 2022-03-07 RX ORDER — LAMOTRIGINE 25 MG/1
50 TABLET ORAL DAILY
Qty: 60 TABLET | Refills: 2 | Status: SHIPPED | OUTPATIENT
Start: 2022-03-07 | End: 2022-04-05 | Stop reason: SDUPTHER

## 2022-03-08 ENCOUNTER — TELEPHONE (OUTPATIENT)
Dept: FAMILY MEDICINE CLINIC | Age: 57
End: 2022-03-08

## 2022-03-08 RX ORDER — ROPINIROLE 0.5 MG/1
0.5 TABLET, FILM COATED ORAL 3 TIMES DAILY
Qty: 90 TABLET | Refills: 0 | Status: SHIPPED | OUTPATIENT
Start: 2022-03-08 | End: 2022-05-16

## 2022-03-08 NOTE — TELEPHONE ENCOUNTER
Pt called stating that she is having restless leg syndrome. Her symptoms include pain in both legs, and twitching. Pt is wanting a treatment for this.    Please advise

## 2022-03-09 DIAGNOSIS — F32.A ANXIETY AND DEPRESSION: ICD-10-CM

## 2022-03-09 DIAGNOSIS — F41.9 ANXIETY AND DEPRESSION: ICD-10-CM

## 2022-03-09 RX ORDER — LORAZEPAM 0.5 MG/1
0.5 TABLET ORAL EVERY 8 HOURS PRN
Qty: 90 TABLET | Refills: 2 | Status: SHIPPED | OUTPATIENT
Start: 2022-03-09 | End: 2022-04-08

## 2022-03-21 ENCOUNTER — HOSPITAL ENCOUNTER (OUTPATIENT)
Age: 57
Discharge: HOME | End: 2022-03-21
Payer: MEDICARE

## 2022-03-21 ENCOUNTER — HOSPITAL ENCOUNTER (OUTPATIENT)
Dept: GENERAL RADIOLOGY | Age: 57
Discharge: HOME | End: 2022-03-21
Payer: MEDICARE

## 2022-03-21 ENCOUNTER — TELEMEDICINE (OUTPATIENT)
Dept: FAMILY MEDICINE CLINIC | Age: 57
End: 2022-03-21
Payer: MEDICARE

## 2022-03-21 DIAGNOSIS — M79.671 PAIN OF BOTH HEELS: ICD-10-CM

## 2022-03-21 DIAGNOSIS — M79.672 PAIN OF BOTH HEELS: Primary | ICD-10-CM

## 2022-03-21 DIAGNOSIS — M79.672 PAIN OF BOTH HEELS: ICD-10-CM

## 2022-03-21 DIAGNOSIS — M79.671 PAIN OF BOTH HEELS: Primary | ICD-10-CM

## 2022-03-21 PROCEDURE — 3017F COLORECTAL CA SCREEN DOC REV: CPT | Performed by: FAMILY MEDICINE

## 2022-03-21 PROCEDURE — G8428 CUR MEDS NOT DOCUMENT: HCPCS | Performed by: FAMILY MEDICINE

## 2022-03-21 PROCEDURE — 99213 OFFICE O/P EST LOW 20 MIN: CPT | Performed by: FAMILY MEDICINE

## 2022-03-21 PROCEDURE — 73630 X-RAY EXAM OF FOOT: CPT

## 2022-03-21 RX ORDER — PREDNISONE 10 MG/1
10 TABLET ORAL DAILY
Qty: 45 TABLET | Refills: 0 | Status: SHIPPED | OUTPATIENT
Start: 2022-03-21 | End: 2022-06-21

## 2022-03-21 ASSESSMENT — ENCOUNTER SYMPTOMS: COLOR CHANGE: 0

## 2022-03-21 NOTE — PROGRESS NOTES
[address]  [address] [place] [address], [address] [place] [address]  Phone:  [phone]       3/21/2022    TELEHEALTH EVALUATION -- Audio/Visual (During QOJQJ-86 public health emergency)    HPI:    Vero Hinson (:  1965) has requested an audio/video evaluation for the following concern(s):  Heel pain    Davi Plummer has had bilateral heel pain for the past few months with no inciting injury. The pain is not bad when she first gets up to walk, but is worse the longer she's on her feet. She's a  and works long hours which is hard on her. She's been taking some old Vicodin occasionally which help some. X-rays were ordered in December, but she hasn't had them done yet. Review of Systems   Musculoskeletal: Positive for arthralgias and gait problem. Skin: Negative for color change and rash. Prior to Visit Medications    Medication Sig Taking? Authorizing Provider   predniSONE (DELTASONE) 10 MG tablet Take 1 tablet by mouth daily Take 5 tabs daily for 3 days, then 4 tabs daily for 3 days, then 3 tabs daily for 3 days, then 2 tabs daily for 3 days, then 1 tab daily for 3 days. Yes Tess Corea MD   LORazepam (ATIVAN) 0.5 MG tablet Take 1 tablet by mouth every 8 hours as needed for Anxiety for up to 30 days.   Tess Corea MD   rOPINIRole (REQUIP) 0.5 MG tablet Take 1 tablet by mouth 3 times daily  Tess Corea MD   lamoTRIgine (LAMICTAL) 25 MG tablet Take 2 tablets by mouth daily  Tess Corea MD   mirtazapine (REMERON) 30 MG tablet take 1 tablet by mouth every evening  Tess Corea MD   amitriptyline (ELAVIL) 10 MG tablet take 2 tablets by mouth every evening  Tess Corea MD   LORazepam (ATIVAN) 0.5 MG tablet TAKE 1 TABLET BY MOUTH EVERY 8 HOURS AS NEEDED FOR ANXIETY  Historical Provider, MD   budesonide-formoterol (SYMBICORT) 160-4.5 MCG/ACT AERO inhale 2 puffs by mouth twice a day  Tess Corea MD   aspirin (RA ASPIRIN ADULT LOW STRENGTH) 81 MG chewable tablet Chew and swallow 1 tablet by mouth once daily  Kait Lindsay MD   oxybutynin (DITROPAN XL) 5 MG extended release tablet Take 1 tablet by mouth daily  Kait Lindsay MD   albuterol sulfate HFA (VENTOLIN HFA) 108 (90 Base) MCG/ACT inhaler Inhale 2 puffs into the lungs every 4 hours as needed for Wheezing  Kait Lindsay MD   omeprazole (PRILOSEC) 40 MG delayed release capsule Take 1 capsule by mouth every morning (before breakfast)  Kait Lindsay MD   Multiple Vitamins-Minerals (RA ONE DAILY MAXIMUM) TABS take 1 tablet by mouth once daily  Kait Lindsay MD   Omega-3 Fatty Acids (FISH OIL) 1000 MG CAPS Take 3,000 mg by mouth 3 times daily  Historical Provider, MD   Ascorbic Acid (STEPH-C PO) Take by mouth daily  Historical Provider, MD       Social History     Tobacco Use    Smoking status: Former Smoker     Packs/day: 1.00     Years: 37.00     Pack years: 37.00     Types: Cigarettes     Quit date: 3/1/2021     Years since quittin.0    Smokeless tobacco: Never Used    Tobacco comment: Not interested at this time   Vaping Use    Vaping Use: Every day    Start date: 3/16/2021    Substances: Nicotine   Substance Use Topics    Alcohol use: Not Currently     Comment: social    Drug use: No        No Known Allergies,   Past Medical History:   Diagnosis Date    [facility]     GERD (gastroesophageal reflux disease)     Schizoaffective disorder, bipolar type with good prognostic features ([facility]     TIA (transient ischemic attack)        PHYSICAL EXAMINATION:    Constitutional: [x] Appears well-developed and well-nourished [x] No apparent distress      [] Abnormal-   Mental status  [x] Alert and awake  [] Oriented to person/place/time []Able to follow commands      Eyes:  EOM    [x]  Normal  [] Abnormal-  Sclera  [x]  Normal  [] Abnormal -         Discharge [x]  None visible  [] Abnormal -    HENT:   [x] Normocephalic, atraumatic. [] Abnormal   [x] Mouth/Throat: Mucous membranes are moist.     External Ears [] Normal  [] Abnormal-     Neck: [] No visualized mass     Pulmonary/Chest: [x] Respiratory effort normal.  [x] No visualized signs of difficulty breathing or respiratory distress        [] Abnormal-      Musculoskeletal:   [] Normal gait with no signs of ataxia         [] Normal range of motion of neck        [] Abnormal-       Neurological:        [] No Facial Asymmetry (Cranial nerve 7 motor function) (limited exam to video visit)          [] No gaze palsy        [] Abnormal-         Skin:        [] No significant exanthematous lesions or discoloration noted on facial skin         [] Abnormal-            Psychiatric:       [] Normal Affect [] No Hallucinations        [] Abnormal-       ASSESSMENT/PLAN:  1. Pain of both heels  - Will treat with NSAIDs and oral steroids. Referral to podiatry was given. She was encouraged to have the x-rays of her feet completed. - predniSONE (DELTASONE) 10 MG tablet; Take 1 tablet by mouth daily Take 5 tabs daily for 3 days, then 4 tabs daily for 3 days, then 3 tabs daily for 3 days, then 2 tabs daily for 3 days, then 1 tab daily for 3 days. Dispense: 45 tablet; Refill: 0  - [facility] - Dio Archer DPM, Podiatry, [facility]      Return if symptoms worsen or fail to improve. Dahiana Kline is a 55 y.o. female being evaluated by a Virtual Visit (video visit) encounter to address concerns as mentioned above. A caregiver was present when appropriate. Due to this being a TeleHealth encounter (During [facility] public health emergency), evaluation of the following organ systems was limited: Vitals/Constitutional/EENT/Resp/CV/GI//MS/Neuro/Skin/Heme-Lymph-Imm.   Pursuant to the emergency declaration under the [facility], [facility] waiver authority and the [employer] and [employer] Act, this Virtual Visit was conducted with patient's (and/or legal guardian's) consent, to reduce the patient's risk of exposure to COVID-19 and provide necessary medical care. The patient (and/or legal guardian) has also been advised to contact this office for worsening conditions or problems, and seek emergency medical treatment and/or call 911 if deemed necessary. Patient identification was verified at the start of the visit: Yes    Services were provided through a video synchronous discussion virtually to substitute for in-person clinic visit. Patient and provider were located at their individual homes. --Solo Thurston MD on 3/21/2022 at 12:35 PM    An electronic signature was used to authenticate this note.

## 2022-04-04 DIAGNOSIS — R51.9 CHRONIC DAILY HEADACHE: ICD-10-CM

## 2022-04-05 DIAGNOSIS — R51.9 CHRONIC DAILY HEADACHE: ICD-10-CM

## 2022-04-05 DIAGNOSIS — F25.0 SCHIZOAFFECTIVE DISORDER, BIPOLAR TYPE WITH GOOD PROGNOSTIC FEATURES (HCC): ICD-10-CM

## 2022-04-05 RX ORDER — AMITRIPTYLINE HYDROCHLORIDE 10 MG/1
TABLET, FILM COATED ORAL
Qty: 60 TABLET | Refills: 1 | Status: SHIPPED | OUTPATIENT
Start: 2022-04-05 | End: 2022-04-05 | Stop reason: SDUPTHER

## 2022-04-05 RX ORDER — AMITRIPTYLINE HYDROCHLORIDE 10 MG/1
TABLET, FILM COATED ORAL
Qty: 60 TABLET | Refills: 2 | Status: SHIPPED | OUTPATIENT
Start: 2022-04-05

## 2022-04-05 RX ORDER — LAMOTRIGINE 25 MG/1
50 TABLET ORAL DAILY
Qty: 60 TABLET | Refills: 2 | Status: SHIPPED | OUTPATIENT
Start: 2022-04-05 | End: 2022-06-21 | Stop reason: SDUPTHER

## 2022-05-16 ENCOUNTER — HOSPITAL ENCOUNTER (OUTPATIENT)
Dept: MAMMOGRAPHY | Age: 57
Discharge: HOME | End: 2022-05-16
Payer: MEDICARE

## 2022-05-16 DIAGNOSIS — Z12.31 VISIT FOR SCREENING MAMMOGRAM: ICD-10-CM

## 2022-05-16 PROCEDURE — 77063 BREAST TOMOSYNTHESIS BI: CPT

## 2022-05-16 RX ORDER — ROPINIROLE 0.5 MG/1
TABLET, FILM COATED ORAL
Qty: 90 TABLET | Refills: 0 | Status: SHIPPED | OUTPATIENT
Start: 2022-05-16 | End: 2022-08-29

## 2022-06-20 ENCOUNTER — TELEPHONE (OUTPATIENT)
Dept: FAMILY MEDICINE CLINIC | Age: 57
End: 2022-06-20

## 2022-06-20 ASSESSMENT — ENCOUNTER SYMPTOMS
COUGH: 1
SHORTNESS OF BREATH: 1

## 2022-06-20 NOTE — PROGRESS NOTES
[address] [place] [address]  Phone:  [phone]          Name: Winnie Bettencourt  : 1965    Chief Complaint   Patient presents with    Dizziness    Stress     discuss starting xanax. Patient states she is not currently drinking alcohol        HPI:     Winnie Bettencourt is a 55 y.o. female who presents today for evaluation of lightheadedness and dizziness and for follow up of anxiety. She's had intermittent dizziness for the past month where she feels like the room is spinning. She's been working a lot and thinks it's too much. She can feel dizzy when she lifts her hands above her head or when she moves quickly. She's had a lot of stress as she's been working a lot at [employer] and is taking custody of her grandchildren. She doesn't feel her Ativan is working and would like to try something different. 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 Current Outpatient Medications:     lamoTRIgine (LAMICTAL) 100 MG tablet, Take 0.5 tablets by mouth daily, Disp: 30 tablet, Rfl: 5    ALPRAZolam (XANAX) 0.25 MG tablet, Take 1 tablet by mouth 3 times daily as needed for Anxiety for up to 30 days. , Disp: 90 tablet, Rfl: 2    meclizine (ANTIVERT) 25 MG tablet, Take 1 tablet by mouth 3 times daily as needed for Dizziness, Disp: 15 tablet, Rfl: 0    rOPINIRole (REQUIP) 0.5 MG tablet, take 1 tablet by mouth three times a day, Disp: 90 tablet, Rfl: 0    amitriptyline (ELAVIL) 10 MG tablet, take 2 tablets by mouth every evening, Disp: 60 tablet, Rfl: 2    mirtazapine (REMERON) 30 MG tablet, take 1 tablet by mouth every evening, Disp: 90 tablet, Rfl: 1    budesonide-formoterol (SYMBICORT) 160-4.5 MCG/ACT AERO, inhale 2 puffs by mouth twice a day, Disp: 10.2 g, Rfl: 5    aspirin (RA ASPIRIN ADULT LOW STRENGTH) 81 MG chewable tablet, Chew and swallow 1 tablet by mouth once daily, Disp: 365 tablet, Rfl: 0    oxybutynin (DITROPAN XL) 5 MG extended release tablet, Take 1 tablet by mouth daily, Disp: 90 tablet, Rfl: 1    albuterol sulfate HFA (VENTOLIN HFA) 108 (90 Base) MCG/ACT inhaler, Inhale 2 puffs into the lungs every 4 hours as needed for Wheezing, Disp: 1 each, Rfl: 5    omeprazole (PRILOSEC) 40 MG delayed release capsule, Take 1 capsule by mouth every morning (before breakfast), Disp: 30 capsule, Rfl: 5    Multiple Vitamins-Minerals (RA ONE DAILY MAXIMUM) TABS, take 1 tablet by mouth once daily, Disp: 30 tablet, Rfl: 3    Omega-3 Fatty Acids (FISH OIL) 1000 MG CAPS, Take 3,000 mg by mouth 3 times daily, Disp: , Rfl:     Ascorbic Acid (STEPH-C PO), Take by mouth daily, Disp: , Rfl:     No Known Allergies    Subjective:      Review of Systems   Respiratory: Positive for cough and shortness of breath (chronic). Neurological: Positive for dizziness, light-headedness and headaches. Psychiatric/Behavioral: Positive for dysphoric mood. The patient is nervous/anxious. Objective:     /70 (Site: Left Upper Arm, Position: Sitting, Cuff Size: Medium Adult)   Pulse 68   Resp 16   Wt 140 lb 9.6 oz (63.8 kg)   BMI 24.13 kg/m²     Physical Exam  Vitals and nursing note reviewed. Constitutional:       General: She is not in acute distress. Appearance: She is well-developed. HENT:      Head: Normocephalic and atraumatic. Nose: Nose normal.   Eyes:      Conjunctiva/sclera: Conjunctivae normal.   Cardiovascular:      Rate and Rhythm: Normal rate and regular rhythm. Heart sounds: Normal heart sounds. Pulmonary:      Effort: Pulmonary effort is normal. No respiratory distress. Breath sounds: Normal breath sounds. No wheezing. Musculoskeletal:      Cervical back: Normal range of motion and neck supple. Skin:     General: Skin is warm and dry. Neurological:      Mental Status: She is alert and oriented to person, place, and time. Psychiatric:         Mood and Affect: Mood is anxious and depressed.          Behavior: Behavior normal. Assessment/Plan:     Sindy Salazar was seen today for dizziness and stress. Diagnoses and all orders for this visit:    Anxiety and depression  -     Her anxiety level has been high and she doesn't feel the Ativan is helping so will discontinue it and start on Xanax. She was encouraged to contact our office with worsening mood or medication side effects.  -     ALPRAZolam (XANAX) 0.25 MG tablet; Take 1 tablet by mouth 3 times daily as needed for Anxiety for up to 30 days. Schizoaffective disorder, bipolar type with good prognostic features ([facility]  -     She is still having a lot of mood swings so will increase the Lamictal from 50 mg to 100 mg daily. -     lamoTRIgine (LAMICTAL) 100 MG tablet; Take 0.5 tablets by mouth daily    Dizziness  -     meclizine (ANTIVERT) 25 MG tablet; Take 1 tablet by mouth 3 times daily as needed for Dizziness        Return in about 1 month (around 7/21/2022) for anxiety, depression.     Electronically signed by Alexa Ochoa MD on 6/21/2022 at 2:51 PM

## 2022-06-20 NOTE — TELEPHONE ENCOUNTER
Please schedule for tomorrow or the end of the day today.   Advise her to increase her water intake and to rest.

## 2022-06-20 NOTE — TELEPHONE ENCOUNTER
Pt called stating that when she raises her arm or bends over she gets lightheaded/ dizzy. Pt states that she has no way to check BP at home. Pt states she has had an increase in anxiety.   Please advise

## 2022-06-21 ENCOUNTER — OFFICE VISIT (OUTPATIENT)
Dept: FAMILY MEDICINE CLINIC | Age: 57
End: 2022-06-21
Payer: MEDICARE

## 2022-06-21 VITALS
BODY MASS INDEX: 24.13 KG/M2 | HEART RATE: 68 BPM | RESPIRATION RATE: 16 BRPM | DIASTOLIC BLOOD PRESSURE: 70 MMHG | WEIGHT: 140.6 LBS | SYSTOLIC BLOOD PRESSURE: 114 MMHG

## 2022-06-21 DIAGNOSIS — R42 DIZZINESS: ICD-10-CM

## 2022-06-21 DIAGNOSIS — F25.0 SCHIZOAFFECTIVE DISORDER, BIPOLAR TYPE WITH GOOD PROGNOSTIC FEATURES (HCC): ICD-10-CM

## 2022-06-21 DIAGNOSIS — F32.A ANXIETY AND DEPRESSION: Primary | ICD-10-CM

## 2022-06-21 DIAGNOSIS — F41.9 ANXIETY AND DEPRESSION: Primary | ICD-10-CM

## 2022-06-21 PROCEDURE — 3017F COLORECTAL CA SCREEN DOC REV: CPT | Performed by: FAMILY MEDICINE

## 2022-06-21 PROCEDURE — 99214 OFFICE O/P EST MOD 30 MIN: CPT | Performed by: FAMILY MEDICINE

## 2022-06-21 PROCEDURE — G8427 DOCREV CUR MEDS BY ELIG CLIN: HCPCS | Performed by: FAMILY MEDICINE

## 2022-06-21 PROCEDURE — 1036F TOBACCO NON-USER: CPT | Performed by: FAMILY MEDICINE

## 2022-06-21 PROCEDURE — G8420 CALC BMI NORM PARAMETERS: HCPCS | Performed by: FAMILY MEDICINE

## 2022-06-21 RX ORDER — LAMOTRIGINE 100 MG/1
50 TABLET ORAL DAILY
Qty: 30 TABLET | Refills: 5 | Status: SHIPPED | OUTPATIENT
Start: 2022-06-21 | End: 2022-06-21

## 2022-06-21 RX ORDER — LAMOTRIGINE 100 MG/1
100 TABLET ORAL DAILY
Qty: 30 TABLET | Refills: 5 | Status: SHIPPED | OUTPATIENT
Start: 2022-06-21 | End: 2022-07-21

## 2022-06-21 RX ORDER — MECLIZINE HYDROCHLORIDE 25 MG/1
25 TABLET ORAL 3 TIMES DAILY PRN
Qty: 15 TABLET | Refills: 0 | Status: SHIPPED | OUTPATIENT
Start: 2022-06-21 | End: 2022-07-01

## 2022-06-21 RX ORDER — ALPRAZOLAM 0.25 MG/1
0.25 TABLET ORAL 3 TIMES DAILY PRN
Qty: 90 TABLET | Refills: 2 | Status: SHIPPED | OUTPATIENT
Start: 2022-06-21 | End: 2022-10-06 | Stop reason: SDUPTHER

## 2022-07-21 DIAGNOSIS — F51.04 PSYCHOPHYSIOLOGICAL INSOMNIA: ICD-10-CM

## 2022-07-21 DIAGNOSIS — F41.9 ANXIETY AND DEPRESSION: ICD-10-CM

## 2022-07-21 DIAGNOSIS — F32.A ANXIETY AND DEPRESSION: ICD-10-CM

## 2022-07-21 RX ORDER — LORAZEPAM 0.5 MG/1
TABLET ORAL
Qty: 90 TABLET | OUTPATIENT
Start: 2022-07-21

## 2022-07-21 RX ORDER — MIRTAZAPINE 30 MG/1
TABLET, FILM COATED ORAL
Qty: 90 TABLET | Refills: 1 | Status: SHIPPED | OUTPATIENT
Start: 2022-07-21 | End: 2022-10-10 | Stop reason: SDUPTHER

## 2022-07-21 RX ORDER — MIRTAZAPINE 30 MG/1
TABLET, FILM COATED ORAL
Qty: 90 TABLET | Refills: 1 | OUTPATIENT
Start: 2022-07-21

## 2022-07-21 NOTE — TELEPHONE ENCOUNTER
Last visit- 6/21/2022  Next visit- 7/26/2022    Requested Prescriptions     Pending Prescriptions Disp Refills    mirtazapine (REMERON) 30 MG tablet 90 tablet 1     Sig: take 1 tablet by mouth every evening

## 2022-07-21 NOTE — TELEPHONE ENCOUNTER
----- Message from [facility] sent at 7/21/2022  3:36 PM EDT -----  Subject: Refill Request    QUESTIONS  Name of Medication? mirtazapine (REMERON) 30 MG tablet  Patient-reported dosage and instructions? one tablet nightly  How many days do you have left? 0  Preferred Pharmacy? [id number]  Pharmacy phone number (if available)? [phone]  ---------------------------------------------------------------------------  --------------  CALL BACK INFO  What is the best way for the office to contact you? OK to leave message on   voicemail  Preferred Call Back Phone Number? [phone]  ---------------------------------------------------------------------------  --------------  SCRIPT ANSWERS  Relationship to Patient?  Self

## 2022-08-22 NOTE — ACP (ADVANCE CARE PLANNING)
HPI: Patient is a 6 year old male who presents with congestion, runny nose, postnasal drip, cough and right ear pain.  All his symptoms started about 5 days ago and the right ear started to hurt badly this morning.  No NSAIDs or Tylenol administered.  No fevers or chills.          Review of Systems   Constitutional: Negative for fever.   HENT: Positive for congestion, ear pain and sinus pain. Negative for sore throat.    Respiratory: Negative for sputum production, shortness of breath and wheezing.    Cardiovascular: Negative for chest pain, claudication and leg swelling.   Genitourinary: Negative.    Musculoskeletal: Negative.             There is no problem list on file for this patient.       PAST MEDICAL HX:    No known problems                                             PAST SURGICAL HX:  There is no previous surgical history on file.          Vitals:    08/22/22 1021   Pulse: 109   Resp: 22   Temp: 98.9 °F (37.2 °C)        Physical Exam  Vitals and nursing note reviewed.   Constitutional:       General: He is not in acute distress.     Appearance: Normal appearance. He is not ill-appearing or toxic-appearing.   HENT:      Head: Normocephalic and atraumatic.      Right Ear: A middle ear effusion is present. Tympanic membrane is erythematous and bulging.      Left Ear: Tympanic membrane normal.      Nose: Congestion present.      Mouth/Throat:      Mouth: Mucous membranes are moist.      Pharynx: Posterior oropharyngeal erythema present.   Eyes:      Pupils: Pupils are equal, round, and reactive to light.   Cardiovascular:      Rate and Rhythm: Normal rate and regular rhythm.   Pulmonary:      Effort: Pulmonary effort is normal. No respiratory distress.      Breath sounds: Normal breath sounds. No wheezing.   Abdominal:      General: Bowel sounds are normal.      Tenderness: There is no abdominal tenderness.   Musculoskeletal:      Cervical back: Normal range of motion and neck supple.   Lymphadenopathy:       Reviewed and no changes. Cervical: Cervical adenopathy present.   Skin:     General: Skin is warm.      Capillary Refill: Capillary refill takes less than 2 seconds.      Findings: No rash.   Neurological:      Mental Status: He is alert and oriented to person, place, and time.            No results found for this visit on 08/22/22.             MDM:  Amoxicillin prescribed as a first-line treatment for otitis media.      ED Diagnosis     Diagnosis Comment Associated Orders       Final diagnosis    Otitis media of right ear in disease classified elsewhere -- --           New Prescriptions    AMOXICILLIN (AMOXIL) 400 MG/5ML SUSPENSION    Take 11.9 mLs by mouth in the morning and 11.9 mLs in the evening. Do all this for 7 days.         Disposition: Home

## 2022-08-29 RX ORDER — ROPINIROLE 0.5 MG/1
TABLET, FILM COATED ORAL
Qty: 90 TABLET | Refills: 0 | Status: SHIPPED | OUTPATIENT
Start: 2022-08-29

## 2022-10-05 RX ORDER — MECLIZINE HYDROCHLORIDE 25 MG/1
25 TABLET ORAL 3 TIMES DAILY PRN
Qty: 30 TABLET | Refills: 0 | OUTPATIENT
Start: 2022-10-05 | End: 2022-11-04

## 2022-10-05 RX ORDER — ALPRAZOLAM 0.25 MG/1
0.25 TABLET ORAL 3 TIMES DAILY PRN
Qty: 30 TABLET | Refills: 0 | OUTPATIENT
Start: 2022-10-05 | End: 2022-11-04

## 2022-10-05 NOTE — TELEPHONE ENCOUNTER
----- Message from [facility] sent at 10/5/2022  3:09 PM EDT -----  Subject: Refill Request    QUESTIONS  Name of Medication? meclizine (ANTIVERT) 25 MG tablet  Patient-reported dosage and instructions? 25 MG take one tablet by mouth 3   times a day as needed for dizziness   How many days do you have left? 5  Preferred Pharmacy? [address] [place] [address] #[id number]  Pharmacy phone number (if available)? [phone]  ---------------------------------------------------------------------------  --------------  CALL BACK INFO  What is the best way for the office to contact you? OK to leave message on   voicemail  Preferred Call Back Phone Number? [phone]  ---------------------------------------------------------------------------  --------------  SCRIPT ANSWERS  Relationship to Patient?  Self

## 2022-10-05 NOTE — TELEPHONE ENCOUNTER
Last visit- 6/21/2022  Next visit- 10/10/2022    Requested Prescriptions     Pending Prescriptions Disp Refills    meclizine (ANTIVERT) 25 MG tablet 30 tablet 0     Sig: Take 1 tablet by mouth 3 times daily as needed for Dizziness    ALPRAZolam (XANAX) 0.25 MG tablet 30 tablet 0     Sig: Take 1 tablet by mouth 3 times daily as needed for Anxiety for up to 30 days.

## 2022-10-06 DIAGNOSIS — F32.A ANXIETY AND DEPRESSION: ICD-10-CM

## 2022-10-06 DIAGNOSIS — F41.9 ANXIETY AND DEPRESSION: ICD-10-CM

## 2022-10-06 RX ORDER — ALPRAZOLAM 0.25 MG/1
0.25 TABLET ORAL 3 TIMES DAILY PRN
Qty: 90 TABLET | Refills: 2 | Status: SHIPPED | OUTPATIENT
Start: 2022-10-06 | End: 2022-11-05

## 2022-10-06 NOTE — PROGRESS NOTES
[address]  [address] [place] [address]  Phone:  [phone]       10/10/2022    TELEHEALTH EVALUATION -- Audio/Visual (During KQVSP-84 public health emergency)    HPI:    Carmita Garces (:  1965) has requested an audio/video evaluation for the following concern(s):  anxiety and depression    Her anxiety is through the roof. Her daughter is going to court on Wednesday for a custody case and she's praying she gets her children back. She hasn't been eating very much. She thinks her weight has been stable. She's been working a lot. She and her  have been getting along better. She's taking Xanax 0.25 mg TID. She thinks she needs something more as she's been more depressed. Review of Systems   Psychiatric/Behavioral:  Positive for dysphoric mood. The patient is nervous/anxious. Prior to Visit Medications    Medication Sig Taking? Authorizing Provider   mirtazapine (REMERON) 45 MG tablet take 1 tablet by mouth every evening Yes Rebeca Wallis MD   ALPRAZolam Malounatty Young) 0.25 MG tablet Take 1 tablet by mouth 3 times daily as needed for Anxiety for up to 30 days.   Rebeca Wallis MD   rOPINIRole (REQUIP) 0.5 MG tablet take 1 tablet by mouth three times a day  Rebeca Wallis MD   lamoTRIgine (LAMICTAL) 100 MG tablet Take 1 tablet by mouth daily  Rebeca Wallis MD   amitriptyline (ELAVIL) 10 MG tablet take 2 tablets by mouth every evening  Rebeca Wallis MD   budesonide-formoterol (SYMBICORT) 160-4.5 MCG/ACT AERO inhale 2 puffs by mouth twice a day  Rebeca Wallis MD   aspirin (RA ASPIRIN ADULT LOW STRENGTH) 81 MG chewable tablet Chew and swallow 1 tablet by mouth once daily  Rebeca Wallis MD   oxybutynin (DITROPAN XL) 5 MG extended release tablet Take 1 tablet by mouth daily  Rebeca Wallis MD   albuterol sulfate HFA (VENTOLIN HFA) 108 (90 Base) MCG/ACT inhaler Inhale 2 puffs into the lungs every 4 hours as needed for Robert Davila MD   omeprazole (PRILOSEC) 40 MG delayed release capsule Take 1 capsule by mouth every morning (before breakfast)  Willis Gomez MD   Multiple Vitamins-Minerals (RA ONE DAILY MAXIMUM) TABS take 1 tablet by mouth once daily  Willis Gomez MD   Omega-3 Fatty Acids (FISH OIL) 1000 MG CAPS Take 3,000 mg by mouth 3 times daily  Historical Provider, MD   Ascorbic Acid (STEPH-C PO) Take by mouth daily  Historical Provider, MD       Social History     Tobacco Use    Smoking status: Former     Packs/day: 1.00     Years: 37.00     Pack years: 37.00     Types: Cigarettes     Quit date: 3/1/2021     Years since quittin.6    Smokeless tobacco: Never    Tobacco comments:     Not interested at this time   Vaping Use    Vaping Use: Every day    Start date: 3/16/2021    Substances: Nicotine   Substance Use Topics    Alcohol use: Not Currently     Comment: social    Drug use: No        No Known Allergies,   Past Medical History:   Diagnosis Date    COPD (chronic obstructive pulmonary disease) ([facility])     GERD (gastroesophageal reflux disease)     Schizoaffective disorder, bipolar type with good prognostic features ([facility] [address]     TIA (transient ischemic attack)        PHYSICAL EXAMINATION:    Constitutional: [x] Appears well-developed and well-nourished [x] No apparent distress      [] Abnormal-   Mental status  [x] Alert and awake  [] Oriented to person/place/time []Able to follow commands      Eyes:  EOM    [x]  Normal  [] Abnormal-  Sclera  [x]  Normal  [] Abnormal -         Discharge [x]  None visible  [] Abnormal -    HENT:   [x] Normocephalic, atraumatic.   [] Abnormal   [x] Mouth/Throat: Mucous membranes are moist.     External Ears [] Normal  [] Abnormal-     Neck: [] No visualized mass     Pulmonary/Chest: [x] Respiratory effort normal.  [x] No visualized signs of difficulty breathing or respiratory distress        [] Abnormal-      Musculoskeletal:   [] Normal gait with no signs of ataxia         [] Normal range of motion of neck        [] Abnormal-       Neurological:        [] No Facial Asymmetry (Cranial nerve 7 motor function) (limited exam to video visit)          [] No gaze palsy        [] Abnormal-         Skin:        [] No significant exanthematous lesions or discoloration noted on facial skin         [] Abnormal-            Psychiatric:       [] Normal Affect [] No Hallucinations        [] Abnormal-       ASSESSMENT/PLAN:  1. Anxiety and depression  2. Psychophysiological insomnia  - Will increase Remeron from 30 mg to 45 mg nightly to help with mood. She was encouraged to contact our office with worsening mood or medication side effects. - mirtazapine (REMERON) 45 MG tablet; take 1 tablet by mouth every evening  Dispense: 90 tablet; Refill: 1    Return in about 1 month (around 11/10/2022) for anxiety, depression. Erki Macias is a 65 y.o. female being evaluated by a Virtual Visit (video visit) encounter to address concerns as mentioned above. A caregiver was present when appropriate. Due to this being a TeleHealth encounter (During [facility]-05 public health emergency), evaluation of the following organ systems was limited: Vitals/Constitutional/EENT/Resp/CV/GI//MS/Neuro/Skin/Heme-Lymph-Imm. Pursuant to the emergency declaration under the [address] [place] [address] authority and the [employer] and [employer] Act, this Virtual Visit was conducted with patient's (and/or legal guardian's) consent, to reduce the patient's risk of exposure to COVID-19 and provide necessary medical care. The patient (and/or legal guardian) has also been advised to contact this office for worsening conditions or problems, and seek emergency medical treatment and/or call 911 if deemed necessary.      Patient identification was verified at the start of the visit: Yes    Services were provided through a video synchronous discussion virtually to substitute for in-person clinic visit. Patient and provider were located at their individual homes. --Atul Shelton MD on 10/10/2022 at 12:07 PM    An electronic signature was used to authenticate this note.

## 2022-10-06 NOTE — TELEPHONE ENCOUNTER
Pt called wanting to know why her Xanax refill was denied. She has an appt scheduled with you on 10-10 and im assuming you would be refilling it after her next OV. Please advise.

## 2022-10-10 ENCOUNTER — TELEMEDICINE (OUTPATIENT)
Dept: FAMILY MEDICINE CLINIC | Age: 57
End: 2022-10-10
Payer: MEDICARE

## 2022-10-10 DIAGNOSIS — F41.9 ANXIETY AND DEPRESSION: Primary | ICD-10-CM

## 2022-10-10 DIAGNOSIS — F32.A ANXIETY AND DEPRESSION: Primary | ICD-10-CM

## 2022-10-10 DIAGNOSIS — F51.04 PSYCHOPHYSIOLOGICAL INSOMNIA: ICD-10-CM

## 2022-10-10 PROCEDURE — 3017F COLORECTAL CA SCREEN DOC REV: CPT | Performed by: FAMILY MEDICINE

## 2022-10-10 PROCEDURE — 99213 OFFICE O/P EST LOW 20 MIN: CPT | Performed by: FAMILY MEDICINE

## 2022-10-10 PROCEDURE — G8427 DOCREV CUR MEDS BY ELIG CLIN: HCPCS | Performed by: FAMILY MEDICINE

## 2022-10-10 PROCEDURE — G8420 CALC BMI NORM PARAMETERS: HCPCS | Performed by: FAMILY MEDICINE

## 2022-10-10 PROCEDURE — 1036F TOBACCO NON-USER: CPT | Performed by: FAMILY MEDICINE

## 2022-10-10 PROCEDURE — G8484 FLU IMMUNIZE NO ADMIN: HCPCS | Performed by: FAMILY MEDICINE

## 2022-10-10 RX ORDER — MIRTAZAPINE 45 MG/1
TABLET, FILM COATED ORAL
Qty: 90 TABLET | Refills: 1 | Status: SHIPPED | OUTPATIENT
Start: 2022-10-10

## 2022-10-10 ASSESSMENT — PATIENT HEALTH QUESTIONNAIRE - PHQ9
SUM OF ALL RESPONSES TO PHQ9 QUESTIONS 1 & 2: 2
SUM OF ALL RESPONSES TO PHQ QUESTIONS 1-9: 2
1. LITTLE INTEREST OR PLEASURE IN DOING THINGS: 1
SUM OF ALL RESPONSES TO PHQ QUESTIONS 1-9: 2
2. FEELING DOWN, DEPRESSED OR HOPELESS: 1

## 2022-10-11 RX ORDER — MEGESTROL ACETATE 20 MG/1
20 TABLET ORAL DAILY
Qty: 30 TABLET | Refills: 3 | Status: SHIPPED | OUTPATIENT
Start: 2022-10-11 | End: 2022-10-24 | Stop reason: SDUPTHER

## 2022-10-24 RX ORDER — MEGESTROL ACETATE 40 MG/1
40 TABLET ORAL DAILY
Qty: 30 TABLET | Refills: 5 | Status: SHIPPED | OUTPATIENT
Start: 2022-10-24

## 2022-11-01 RX ORDER — MECLIZINE HYDROCHLORIDE 25 MG/1
25 TABLET ORAL 3 TIMES DAILY PRN
Qty: 30 TABLET | Refills: 0 | Status: SHIPPED | OUTPATIENT
Start: 2022-11-01 | End: 2022-12-01

## 2022-11-01 NOTE — TELEPHONE ENCOUNTER
----- Message from [employer] sent at 11/1/2022 10:36 AM EDT -----  Subject: Refill Request    QUESTIONS  Name of Medication? meclizine (ANTIVERT) 25 MG tablet  Patient-reported dosage and instructions? One tab by mouth 3 times a day   25mg  How many days do you have left? 0  Preferred Pharmacy? [facility] #[id number]  Pharmacy phone number (if available)? [phone]  Additional Information for Provider? Pt would also like to let Provider   know that Angeles Caputo ( Her ) has been in the hospital since Sunday  ---------------------------------------------------------------------------  --------------  [address]  What is the best way for the office to contact you? OK to leave message on   voicemail  Preferred Call Back Phone Number? [phone]  ---------------------------------------------------------------------------  --------------  SCRIPT ANSWERS  Relationship to Patient?  Self

## 2022-11-01 NOTE — TELEPHONE ENCOUNTER
Last visit- 10/10/2022  Next visit- Visit date not found    Requested Prescriptions     Pending Prescriptions Disp Refills    meclizine (ANTIVERT) 25 MG tablet 30 tablet 0     Sig: Take 1 tablet by mouth 3 times daily as needed for Dizziness

## 2022-11-10 ENCOUNTER — HOSPITAL ENCOUNTER (EMERGENCY)
Age: 57
Discharge: HOME | End: 2022-11-10
Payer: MEDICARE

## 2022-11-10 VITALS
RESPIRATION RATE: 14 BRPM | BODY MASS INDEX: 23.9 KG/M2 | TEMPERATURE: 98.4 F | HEART RATE: 87 BPM | OXYGEN SATURATION: 98 % | HEIGHT: 64 IN | DIASTOLIC BLOOD PRESSURE: 87 MMHG | WEIGHT: 140 LBS | SYSTOLIC BLOOD PRESSURE: 160 MMHG

## 2022-11-10 DIAGNOSIS — J00 COMMON COLD: Primary | ICD-10-CM

## 2022-11-10 PROCEDURE — 99213 OFFICE O/P EST LOW 20 MIN: CPT

## 2022-11-10 PROCEDURE — 99213 OFFICE O/P EST LOW 20 MIN: CPT | Performed by: NURSE PRACTITIONER

## 2022-11-10 RX ORDER — FLUTICASONE PROPIONATE 50 MCG
1 SPRAY, SUSPENSION (ML) NASAL DAILY
Qty: 16 G | Refills: 0 | Status: SHIPPED | OUTPATIENT
Start: 2022-11-10

## 2022-11-10 RX ORDER — GUAIFENESIN 600 MG/1
600 TABLET, EXTENDED RELEASE ORAL 2 TIMES DAILY
Qty: 30 TABLET | Refills: 0 | Status: SHIPPED | OUTPATIENT
Start: 2022-11-10 | End: 2022-11-25

## 2022-11-10 ASSESSMENT — ENCOUNTER SYMPTOMS
RHINORRHEA: 1
COUGH: 1
NAUSEA: 0
SINUS PRESSURE: 0
SHORTNESS OF BREATH: 0
SORE THROAT: 0

## 2022-11-10 ASSESSMENT — PAIN - FUNCTIONAL ASSESSMENT: PAIN_FUNCTIONAL_ASSESSMENT: NONE - DENIES PAIN

## 2022-11-10 NOTE — Clinical Note
Troy Jasso was seen and treated in our emergency department on 11/10/2022. She may return to work on 11/12/2022. If you have any questions or concerns, please don't hesitate to call.       Candice Oneill, APRN - CNP

## 2022-11-11 NOTE — ED PROVIDER NOTES
[address]  Urgent Care Encounter       CHIEF COMPLAINT       Chief Complaint   Patient presents with    Cough     \" Family member has rhino virus. Im hoping its not contagious\"       Nurses Notes reviewed and I agree except as noted in the HPI. HISTORY OF PRESENT ILLNESS   Mira Irvin is a 65 y.o. female who presents with complaints of a cough, headache, body aches, and runny nose. Her symptoms started yesterday. She reports that her  has rhinovirus. She believes it is contagious and has the same virus. Denies any shortness of breath or chest pain. Did take Tylenol today for the headache which helped resolve. The history is provided by the patient. REVIEW OF SYSTEMS     Review of Systems   Constitutional:  Negative for chills and fever. HENT:  Positive for rhinorrhea. Negative for congestion, sinus pressure and sore throat. Respiratory:  Positive for cough. Negative for shortness of breath. Gastrointestinal:  Negative for nausea. Musculoskeletal:  Positive for myalgias. Neurological:  Positive for headaches. PAST MEDICAL HISTORY         Diagnosis Date    COPD (chronic obstructive pulmonary disease) (HCC)     GERD (gastroesophageal reflux disease)     Schizoaffective disorder, bipolar type with good prognostic features ([facility] [address]     TIA (transient ischemic attack)        SURGICALHISTORY     Patient  has a past surgical history that includes Tonsillectomy; Tubal ligation; Appendectomy; Carpal tunnel release (Right, 03/2019); and pr bx of breast,vacuum asst,image guide (Left).     CURRENT MEDICATIONS       Previous Medications    ALBUTEROL SULFATE HFA (VENTOLIN HFA) 108 (90 BASE) MCG/ACT INHALER    Inhale 2 puffs into the lungs every 4 hours as needed for Wheezing    AMITRIPTYLINE (ELAVIL) 10 MG TABLET    take 2 tablets by mouth every evening    ASCORBIC ACID (STEPH-C PO)    Take by mouth daily    ASPIRIN (RA ASPIRIN ADULT LOW STRENGTH) 81 MG CHEWABLE TABLET    Chew and swallow 1 tablet by mouth once daily    BUDESONIDE-FORMOTEROL (SYMBICORT) 160-4.5 MCG/ACT AERO    inhale 2 puffs by mouth twice a day    LAMOTRIGINE (LAMICTAL) 100 MG TABLET    Take 1 tablet by mouth daily    MECLIZINE (ANTIVERT) 25 MG TABLET    Take 1 tablet by mouth 3 times daily as needed for Dizziness    MEGESTROL (MEGACE) 40 MG TABLET    Take 1 tablet by mouth daily    MIRTAZAPINE (REMERON) 45 MG TABLET    take 1 tablet by mouth every evening    MULTIPLE VITAMINS-MINERALS (RA ONE DAILY MAXIMUM) TABS    take 1 tablet by mouth once daily    OMEGA-3 FATTY ACIDS (FISH OIL) 1000 MG CAPS    Take 3,000 mg by mouth 3 times daily    OMEPRAZOLE (PRILOSEC) 40 MG DELAYED RELEASE CAPSULE    Take 1 capsule by mouth every morning (before breakfast)    OXYBUTYNIN (DITROPAN XL) 5 MG EXTENDED RELEASE TABLET    Take 1 tablet by mouth daily    ROPINIROLE (REQUIP) 0.5 MG TABLET    take 1 tablet by mouth three times a day       ALLERGIES     Patient is has No Known Allergies. Patients   Immunization History   Administered Date(s) Administered    Influenza Virus Vaccine 09/27/2015, 11/06/2017    Influenza, AFLURIA (age 1 yrs+), FLUZONE, (age 10 mo+), MDV, 0.5mL 11/04/2016    Influenza, Quadv, 6 mo and older, IM (Fluzone, Flulaval) 09/28/2018       FAMILY HISTORY     Patient's family history includes Brain Cancer in her mother; Breast Cancer (age of onset: 29) in her maternal cousin; Cancer in her father; Cirrhosis in her maternal uncle; Dementia in her father; Vena Pih in her mother. SOCIAL HISTORY     Patient  reports that she has been smoking cigarettes and e-cigarettes. She has a 18.50 pack-year smoking history. She has never used smokeless tobacco. She reports that she does not currently use alcohol. She reports that she does not use drugs.     PHYSICAL EXAM     ED TRIAGE VITALS  BP: (!) 160/87, Temp: 98.4 °F (36.9 °C), Heart Rate: 87, Resp: 14, SpO2: 98 %,Estimated body mass index is 24.03 kg/m² as calculated from the Take 1 tablet by mouth 2 times daily for 15 days       Discontinued Medications    No medications on file       Current Discharge Medication List          KEITH Burroughs CNP    (Please note that portions of this note were completed with a voice recognition program. Efforts were made to edit the dictations but occasionally words are mis-transcribed.)           KEITH Burroughs CNP  11/10/22 1912

## 2022-11-14 ENCOUNTER — TELEPHONE (OUTPATIENT)
Dept: FAMILY MEDICINE CLINIC | Age: 57
End: 2022-11-14

## 2022-11-14 NOTE — TELEPHONE ENCOUNTER
Pt called stating that she was seen in the ED on 11-10 and dx'ed with a viral cold. She stated that the Mucinex she was rx'ed has not been relieving her sx's. She still complains of a cough, congestion, and body aches. She wants to know if you could rx her something. There were no openings left today.

## 2022-11-17 ENCOUNTER — HOSPITAL ENCOUNTER (OUTPATIENT)
Age: 57
Discharge: HOME | End: 2022-11-17
Payer: MEDICARE

## 2022-11-17 ENCOUNTER — TELEPHONE (OUTPATIENT)
Dept: FAMILY MEDICINE CLINIC | Age: 57
End: 2022-11-17

## 2022-11-17 DIAGNOSIS — R05.9 COUGH, UNSPECIFIED TYPE: Primary | ICD-10-CM

## 2022-11-17 DIAGNOSIS — R05.9 COUGH, UNSPECIFIED TYPE: ICD-10-CM

## 2022-11-17 LAB
FLU A ANTIGEN: NEGATIVE
FLU B ANTIGEN: NEGATIVE

## 2022-11-17 PROCEDURE — 99211 OFF/OP EST MAY X REQ PHY/QHP: CPT

## 2022-11-17 PROCEDURE — 87804 INFLUENZA ASSAY W/OPTIC: CPT

## 2022-11-17 NOTE — PROGRESS NOTES
Rapid influenza swab obtained and sent to lab. Pt tolerated. Instructed to follow up with family MD and My Chart for results. Voiced understanding.

## 2022-11-17 NOTE — TELEPHONE ENCOUNTER
Pt called again today complaining of cough. I informed her again that it was advised that she have a flu test done. She was agreeable this time. Flu test was ordered. She will have it done today at [facility].

## 2022-11-21 ENCOUNTER — TELEPHONE (OUTPATIENT)
Dept: FAMILY MEDICINE CLINIC | Age: 57
End: 2022-11-21

## 2022-11-21 NOTE — TELEPHONE ENCOUNTER
----- Message from Coye Phalen sent at 11/21/2022  3:38 PM EST -----  Subject: Message to Provider    QUESTIONS  Information for Provider? PT would like to know if she should continue   taking Mucinex with anti-biotic.  Her contact# [phone]  ---------------------------------------------------------------------------  --------------  Aleksandar MCGOWAN  [phone]; OK to leave message on voicemail  ---------------------------------------------------------------------------  --------------  SCRIPT ANSWERS  undefined

## 2022-11-30 ENCOUNTER — TELEPHONE (OUTPATIENT)
Dept: FAMILY MEDICINE CLINIC | Age: 57
End: 2022-11-30

## 2022-11-30 RX ORDER — FLUCONAZOLE 150 MG/1
150 TABLET ORAL
Qty: 2 TABLET | Refills: 0 | Status: SHIPPED | OUTPATIENT
Start: 2022-11-30 | End: 2022-12-06

## 2022-11-30 NOTE — TELEPHONE ENCOUNTER
----- Message from Evelina Saul sent at 11/30/2022  9:05 AM EST -----  Subject: Message to Provider    QUESTIONS  Information for Provider? Patient has been on an antibiotic for the past 2   weeks. She has now developed a yeast infection and would like something   prescribed and sent to [address] [place], [place]   ---------------------------------------------------------------------------  --------------  Vicky MCGOWAN  [phone]; OK to leave message on voicemail  ---------------------------------------------------------------------------  --------------  SCRIPT ANSWERS  Relationship to Patient?  Self

## 2022-12-02 DIAGNOSIS — N39.41 URGE INCONTINENCE: ICD-10-CM

## 2022-12-05 RX ORDER — OXYBUTYNIN CHLORIDE 5 MG/1
TABLET, EXTENDED RELEASE ORAL
Qty: 90 TABLET | Refills: 1 | Status: SHIPPED | OUTPATIENT
Start: 2022-12-05

## 2023-01-03 NOTE — PROGRESS NOTES
[address]  [address] [place] [address]  Phone:  [phone]       2023    TELEHEALTH EVALUATION -- Audio/Visual (During VKMYG-65 public health emergency)    HPI:    Alberto Munoz (:  1965) has requested an audio/video evaluation for the following concern(s):  f/u anxiety    Her anxiety continues to fluctuate. She usually takes a few per day depending on the situation and how stressful her day is. She's been fatigued. She's been coughing and wheezing for the past few weeks. No increased SOB. She had a sore throat but it's a little better. No congestion. No fevers. No nausea, vomiting, or diarrhea. She's using cough syrup but it's not helping much. Review of Systems   Constitutional:  Negative for chills and fever. HENT:  Negative for congestion. Respiratory:  Positive for cough, chest tightness and wheezing. Negative for shortness of breath. Psychiatric/Behavioral:  The patient is nervous/anxious. Prior to Visit Medications    Medication Sig Taking? Authorizing Provider   ALPRAZolam (XANAX) 0.25 MG tablet Take 1 tablet by mouth 3 times daily as needed for Anxiety for up to 30 days.  Yes Ciarra Mensah MD   doxycycline hyclate (VIBRA-TABS) 100 MG tablet Take 1 tablet by mouth 2 times daily for 10 days Yes Ciarra Mensah MD   benzonatate (TESSALON) 100 MG capsule Take 1 capsule by mouth 3 times daily as needed for Cough Yes Ciarra Mensah MD   fluconazole (DIFLUCAN) 150 MG tablet Take 1 tablet by mouth every 72 hours for 6 days Yes Ciarra Mensah MD   Multiple Vitamins-Minerals (RA ONE DAILY MAXIMUM) TABS take 1 tablet by mouth once daily Yes Ciarra Mensah MD   meclizine (ANTIVERT) 25 MG tablet Take 1 tablet by mouth 3 times daily as needed for Dizziness Yes Ciarra Mensah MD   oxybutynin (DITROPAN-XL) 5 MG extended release tablet take 1 tablet by mouth once daily  Ciarra Mensah MD   fluticasone (FLONASE) 50 MCG/ACT nasal spray 1 spray by Each Nostril route daily  [employer], APRN - CNP   megestrol (MEGACE) 40 MG tablet Take 1 tablet by mouth daily  Whit Singh MD   mirtazapine (REMERON) 45 MG tablet take 1 tablet by mouth every evening  Whit Singh MD   rOPINIRole (REQUIP) 0.5 MG tablet take 1 tablet by mouth three times a day  Whit Singh MD   lamoTRIgine (LAMICTAL) 100 MG tablet Take 1 tablet by mouth daily  Whit Singh MD   amitriptyline (ELAVIL) 10 MG tablet take 2 tablets by mouth every evening  Whit Singh MD   aspirin (RA ASPIRIN ADULT LOW STRENGTH) 81 MG chewable tablet Chew and swallow 1 tablet by mouth once daily  Whit Singh MD   albuterol sulfate HFA (VENTOLIN HFA) 108 (90 Base) MCG/ACT inhaler Inhale 2 puffs into the lungs every 4 hours as needed for Wheezing  Whit Singh MD   omeprazole (PRILOSEC) 40 MG delayed release capsule Take 1 capsule by mouth every morning (before breakfast)  Whit Singh MD   Omega-3 Fatty Acids (FISH OIL) 1000 MG CAPS Take 3,000 mg by mouth 3 times daily  Historical Provider, MD   Ascorbic Acid (STEPH-C PO) Take by mouth daily  Historical Provider, MD       Social History     Tobacco Use    Smoking status: Every Day     Packs/day: 0.50     Years: 37.00     Pack years: 18.50     Types: Cigarettes, E-Cigarettes     Last attempt to quit: 3/1/2021     Years since quittin.8    Smokeless tobacco: Never    Tobacco comments:     Not interested at this time   Vaping Use    Vaping Use: Every day    Start date: 3/16/2021    Substances: Nicotine   Substance Use Topics    Alcohol use: Not Currently     Comment: social    Drug use: No        No Known Allergies,   Past Medical History:   Diagnosis Date    COPD (chronic obstructive pulmonary disease) ([facility] [address]     GERD (gastroesophageal reflux disease)     Schizoaffective disorder, bipolar type with good prognostic features [facility]     TIA (transient ischemic attack) PHYSICAL EXAMINATION:    Constitutional: [x] Appears well-developed and well-nourished [x] No apparent distress      [] Abnormal-   Mental status  [x] Alert and awake  [] Oriented to person/place/time []Able to follow commands      Eyes:  EOM    [x]  Normal  [] Abnormal-  Sclera  [x]  Normal  [] Abnormal -         Discharge [x]  None visible  [] Abnormal -    HENT:   [x] Normocephalic, atraumatic. [] Abnormal   [x] Mouth/Throat: Mucous membranes are moist.     External Ears [] Normal  [] Abnormal-     Neck: [] No visualized mass     Pulmonary/Chest: [x] Respiratory effort normal.  [x] No visualized signs of difficulty breathing or respiratory distress        [] Abnormal-      Musculoskeletal:   [] Normal gait with no signs of ataxia         [] Normal range of motion of neck        [] Abnormal-       Neurological:        [] No Facial Asymmetry (Cranial nerve 7 motor function) (limited exam to video visit)          [] No gaze palsy        [] Abnormal-         Skin:        [] No significant exanthematous lesions or discoloration noted on facial skin         [] Abnormal-            Psychiatric:       [] Normal Affect [] No Hallucinations        [] Abnormal-       ASSESSMENT/PLAN:  1. Anxiety and depression  - Her mood has been stable overall so will continue on Xanax PRN.  - ALPRAZolam (XANAX) 0.25 MG tablet; Take 1 tablet by mouth 3 times daily as needed for Anxiety for up to 30 days. Dispense: 90 tablet; Refill: 2    2. Schizoaffective disorder, bipolar type with good prognostic features ([facility] [address]  - Her mood has been stable overall so will continue on Xanax PRN. 3. Chronic bronchitis, unspecified chronic bronchitis type (HCC)  - doxycycline hyclate (VIBRA-TABS) 100 MG tablet; Take 1 tablet by mouth 2 times daily for 10 days  Dispense: 20 tablet; Refill: 0  - benzonatate (TESSALON) 100 MG capsule; Take 1 capsule by mouth 3 times daily as needed for Cough  Dispense: 30 capsule; Refill: 0    4.  Antibiotic-induced yeast infection  - fluconazole (DIFLUCAN) 150 MG tablet; Take 1 tablet by mouth every 72 hours for 6 days  Dispense: 2 tablet; Refill: 0    5. Screening for colon cancer  - Fecal DNA Colorectal cancer screening (Cologuard)    Return in about 3 months (around 4/4/2023) for anxiety. Delfina Gauthier is a 65 y.o. female being evaluated by a Virtual Visit (video visit) encounter to address concerns as mentioned above. A caregiver was present when appropriate. Due to this being a TeleHealth encounter ([facility]), evaluation of the following organ systems was limited: Vitals/Constitutional/EENT/Resp/CV/GI//MS/Neuro/Skin/Heme-Lymph-Imm. Pursuant to the emergency declaration under the [address] [place] [address], [address] [place] [address] authority and the [employer] Act, this Virtual Visit was conducted with patient's (and/or legal guardian's) consent, to reduce the patient's risk of exposure to COVID-19 and provide necessary medical care. The patient (and/or legal guardian) has also been advised to contact this office for worsening conditions or problems, and seek emergency medical treatment and/or call 911 if deemed necessary. Patient identification was verified at the start of the visit: Yes    Services were provided through a video synchronous discussion virtually to substitute for in-person clinic visit. Patient and provider were located at their individual homes. --Noaln Bravo MD on 1/4/2023 at 8:28 AM    An electronic signature was used to authenticate this note.

## 2023-01-04 ENCOUNTER — TELEMEDICINE (OUTPATIENT)
Dept: FAMILY MEDICINE CLINIC | Age: 58
End: 2023-01-04
Payer: MEDICARE

## 2023-01-04 DIAGNOSIS — T36.95XA ANTIBIOTIC-INDUCED YEAST INFECTION: ICD-10-CM

## 2023-01-04 DIAGNOSIS — F32.A ANXIETY AND DEPRESSION: Primary | ICD-10-CM

## 2023-01-04 DIAGNOSIS — F41.9 ANXIETY AND DEPRESSION: Primary | ICD-10-CM

## 2023-01-04 DIAGNOSIS — J42 CHRONIC BRONCHITIS, UNSPECIFIED CHRONIC BRONCHITIS TYPE (HCC): ICD-10-CM

## 2023-01-04 DIAGNOSIS — F25.0 SCHIZOAFFECTIVE DISORDER, BIPOLAR TYPE WITH GOOD PROGNOSTIC FEATURES (HCC): ICD-10-CM

## 2023-01-04 DIAGNOSIS — B37.9 ANTIBIOTIC-INDUCED YEAST INFECTION: ICD-10-CM

## 2023-01-04 DIAGNOSIS — Z12.11 SCREENING FOR COLON CANCER: ICD-10-CM

## 2023-01-04 PROCEDURE — 4004F PT TOBACCO SCREEN RCVD TLK: CPT | Performed by: FAMILY MEDICINE

## 2023-01-04 PROCEDURE — 3017F COLORECTAL CA SCREEN DOC REV: CPT | Performed by: FAMILY MEDICINE

## 2023-01-04 PROCEDURE — 3023F SPIROM DOC REV: CPT | Performed by: FAMILY MEDICINE

## 2023-01-04 PROCEDURE — G8427 DOCREV CUR MEDS BY ELIG CLIN: HCPCS | Performed by: FAMILY MEDICINE

## 2023-01-04 PROCEDURE — G8484 FLU IMMUNIZE NO ADMIN: HCPCS | Performed by: FAMILY MEDICINE

## 2023-01-04 PROCEDURE — G8420 CALC BMI NORM PARAMETERS: HCPCS | Performed by: FAMILY MEDICINE

## 2023-01-04 PROCEDURE — 99214 OFFICE O/P EST MOD 30 MIN: CPT | Performed by: FAMILY MEDICINE

## 2023-01-04 RX ORDER — MECLIZINE HYDROCHLORIDE 25 MG/1
25 TABLET ORAL 3 TIMES DAILY PRN
Qty: 30 TABLET | Refills: 0 | Status: SHIPPED | OUTPATIENT
Start: 2023-01-04 | End: 2023-02-03

## 2023-01-04 RX ORDER — DOXYCYCLINE HYCLATE 100 MG
100 TABLET ORAL 2 TIMES DAILY
Qty: 20 TABLET | Refills: 0 | Status: SHIPPED | OUTPATIENT
Start: 2023-01-04 | End: 2023-01-14

## 2023-01-04 RX ORDER — ALPRAZOLAM 0.25 MG/1
0.25 TABLET ORAL 3 TIMES DAILY PRN
Qty: 90 TABLET | Refills: 2 | Status: SHIPPED | OUTPATIENT
Start: 2023-01-04 | End: 2023-02-03

## 2023-01-04 RX ORDER — FOLIC ACID/MULTIVIT,IRON,MINER 0.4MG-18MG
TABLET ORAL
Qty: 90 TABLET | Refills: 1 | Status: SHIPPED | OUTPATIENT
Start: 2023-01-04

## 2023-01-04 RX ORDER — FLUCONAZOLE 150 MG/1
150 TABLET ORAL
Qty: 2 TABLET | Refills: 0 | Status: SHIPPED | OUTPATIENT
Start: 2023-01-04 | End: 2023-01-10

## 2023-01-04 RX ORDER — BENZONATATE 100 MG/1
100 CAPSULE ORAL 3 TIMES DAILY PRN
Qty: 30 CAPSULE | Refills: 0 | Status: SHIPPED | OUTPATIENT
Start: 2023-01-04 | End: 2023-01-11

## 2023-01-04 ASSESSMENT — PATIENT HEALTH QUESTIONNAIRE - PHQ9
1. LITTLE INTEREST OR PLEASURE IN DOING THINGS: 0
2. FEELING DOWN, DEPRESSED OR HOPELESS: 0
SUM OF ALL RESPONSES TO PHQ9 QUESTIONS 1 & 2: 0
SUM OF ALL RESPONSES TO PHQ QUESTIONS 1-9: 0

## 2023-01-04 ASSESSMENT — ENCOUNTER SYMPTOMS
COUGH: 1
SHORTNESS OF BREATH: 0
WHEEZING: 1
CHEST TIGHTNESS: 1

## 2023-03-03 ENCOUNTER — HOSPITAL ENCOUNTER (EMERGENCY)
Age: 58
Discharge: HOME | End: 2023-03-03
Payer: MEDICAID

## 2023-03-03 VITALS
SYSTOLIC BLOOD PRESSURE: 134 MMHG | WEIGHT: 145 LBS | DIASTOLIC BLOOD PRESSURE: 74 MMHG | BODY MASS INDEX: 24.75 KG/M2 | TEMPERATURE: 98.4 F | RESPIRATION RATE: 16 BRPM | HEIGHT: 64 IN | HEART RATE: 82 BPM

## 2023-03-03 DIAGNOSIS — S61.412A LACERATION OF LEFT HAND WITHOUT FOREIGN BODY, INITIAL ENCOUNTER: Primary | ICD-10-CM

## 2023-03-03 PROCEDURE — 12001 RPR S/N/AX/GEN/TRNK 2.5CM/<: CPT | Performed by: NURSE PRACTITIONER

## 2023-03-03 PROCEDURE — 99213 OFFICE O/P EST LOW 20 MIN: CPT

## 2023-03-03 RX ORDER — LIDOCAINE HYDROCHLORIDE 10 MG/ML
5 INJECTION, SOLUTION INFILTRATION; PERINEURAL ONCE
Status: DISCONTINUED | OUTPATIENT
Start: 2023-03-03 | End: 2023-03-03 | Stop reason: HOSPADM

## 2023-03-03 ASSESSMENT — PAIN - FUNCTIONAL ASSESSMENT: PAIN_FUNCTIONAL_ASSESSMENT: 0-10

## 2023-03-03 ASSESSMENT — PAIN DESCRIPTION - FREQUENCY: FREQUENCY: CONTINUOUS

## 2023-03-03 ASSESSMENT — PAIN DESCRIPTION - ONSET: ONSET: SUDDEN

## 2023-03-03 ASSESSMENT — PAIN SCALES - GENERAL: PAINLEVEL_OUTOF10: 6

## 2023-03-03 ASSESSMENT — PAIN DESCRIPTION - ORIENTATION: ORIENTATION: RIGHT

## 2023-03-03 ASSESSMENT — PAIN DESCRIPTION - LOCATION: LOCATION: HAND

## 2023-03-03 ASSESSMENT — ENCOUNTER SYMPTOMS: COLOR CHANGE: 0

## 2023-03-03 ASSESSMENT — PAIN DESCRIPTION - PAIN TYPE: TYPE: ACUTE PAIN

## 2023-03-03 NOTE — ED NOTES
Patient discharge instructions given to pt and pt verbalized understanding, no other needs at this time, and pt left in stable condition.      Elke Kovacs RN  03/03/23 [id number]

## 2023-03-03 NOTE — ED TRIAGE NOTES
Pt to urgent care due to a 1 cm laceration that occurred this evening when she was opening a fizz ball for the bathtub and the knife  slipped and she cut herself.

## 2023-03-03 NOTE — Clinical Note
Helene Mahoney was seen and treated in our emergency department on 3/3/2023. She may return to work on 03/06/2023. If you have any questions or concerns, please don't hesitate to call.       Brandt Luque, KEITH - CNP
No

## 2023-03-03 NOTE — ED PROVIDER NOTES
[address]  Urgent Care Encounter       CHIEF COMPLAINT       Chief Complaint   Patient presents with    Laceration     Left hand between thumb and finger       Nurses Notes reviewed and I agree except as noted in the HPI. HISTORY OF PRESENT ILLNESS   Chad Rivas is a 65 y.o. female who presents laceration to her left hand between her thumb and first finger. She reports trying to open a bath soap with a knife and slipped cutting her hand. She complains of pain at the site. She did hold pressure to get the bleeding to stop. She denies any decreased range of motion or numbness and tingling. The history is provided by the patient. REVIEW OF SYSTEMS     Review of Systems   Musculoskeletal:  Positive for myalgias. Negative for joint swelling. Skin:  Positive for wound (laceration left hand). Negative for color change. Neurological:  Negative for numbness. PAST MEDICAL HISTORY         Diagnosis Date    COPD (chronic obstructive pulmonary disease) (HCC)     GERD (gastroesophageal reflux disease)     Schizoaffective disorder, bipolar type with good prognostic features ([address]     TIA (transient ischemic attack)        SURGICALHISTORY     Patient  has a past surgical history that includes Tonsillectomy; Tubal ligation; Appendectomy; Carpal tunnel release (Right, 03/2019); and pr bx of breast,vacuum asst,image guide (Left).     CURRENT MEDICATIONS       Previous Medications    ALBUTEROL SULFATE HFA (VENTOLIN HFA) 108 (90 BASE) MCG/ACT INHALER    Inhale 2 puffs into the lungs every 4 hours as needed for Wheezing    AMITRIPTYLINE (ELAVIL) 10 MG TABLET    take 2 tablets by mouth every evening    ASCORBIC ACID (STEPH-C PO)    Take by mouth daily    ASPIRIN (RA ASPIRIN ADULT LOW STRENGTH) 81 MG CHEWABLE TABLET    Chew and swallow 1 tablet by mouth once daily    FLUTICASONE (FLONASE) 50 MCG/ACT NASAL SPRAY    1 spray by Each Nostril route daily    LAMOTRIGINE (LAMICTAL) 100 MG TABLET    Take 1 tablet by mouth daily    MEGESTROL (MEGACE) 40 MG TABLET    Take 1 tablet by mouth daily    MIRTAZAPINE (REMERON) 45 MG TABLET    take 1 tablet by mouth every evening    MULTIPLE VITAMINS-MINERALS (RA ONE DAILY MAXIMUM) TABS    take 1 tablet by mouth once daily    OMEGA-3 FATTY ACIDS (FISH OIL) 1000 MG CAPS    Take 3,000 mg by mouth 3 times daily    OMEPRAZOLE (PRILOSEC) 40 MG DELAYED RELEASE CAPSULE    Take 1 capsule by mouth every morning (before breakfast)    OXYBUTYNIN (DITROPAN-XL) 5 MG EXTENDED RELEASE TABLET    take 1 tablet by mouth once daily    ROPINIROLE (REQUIP) 0.5 MG TABLET    take 1 tablet by mouth three times a day       ALLERGIES     Patient is has No Known Allergies. Patients   Immunization History   Administered Date(s) Administered    Influenza Virus Vaccine 09/27/2015, 11/06/2017    Influenza, AFLURIA (age 1 yrs+), FLUZONE, (age 10 mo+), MDV, 0.5mL 11/04/2016    Influenza, Quadv, 6 mo and older, IM (Fluzone, Flulaval) 09/28/2018       FAMILY HISTORY     Patient's family history includes Brain Cancer in her mother; Breast Cancer (age of onset: 29) in her maternal cousin; Cancer in her father; Cirrhosis in her maternal uncle; Dementia in her father; Pearletha Carlton in her mother. SOCIAL HISTORY     Patient  reports that she has been smoking cigarettes and e-cigarettes. She has a 18.50 pack-year smoking history. She has never used smokeless tobacco. She reports that she does not currently use alcohol. She reports that she does not use drugs. PHYSICAL EXAM     ED TRIAGE VITALS   ,  ,  ,  ,  ,Estimated body mass index is 24.89 kg/m² as calculated from the following:    Height as of this encounter: 5' 4\" (1.626 m). Weight as of this encounter: 145 lb (65.8 kg). ,No LMP recorded. Patient is postmenopausal.    Physical Exam  Vitals and nursing note reviewed. Constitutional:       General: She is not in acute distress. Cardiovascular:      Rate and Rhythm: Normal rate.       Pulses: Normal pulses. Pulmonary:      Effort: Pulmonary effort is normal.   Musculoskeletal:      Left hand: Laceration and tenderness present. Hands:    Skin:     General: Skin is warm and dry. Findings: Laceration (1.1 cm laceration left hand) present. Neurological:      Mental Status: She is alert and oriented to person, place, and time. Motor: No weakness. DIAGNOSTIC RESULTS     Labs:No results found for this visit on 03/03/23.     IMAGING:  None    EKG:  None    URGENT CARE COURSE:     Vitals:    03/03/23 1737   Weight: 145 lb (65.8 kg)   Height: 5' 4\" (1.626 m)       Medications   lidocaine 1 % injection 5 mL (has no administration in time range)          PROCEDURES:  Lac Repair    Date/Time: 3/3/2023 6:10 PM  Performed by: KEITH Franklin CNP  Authorized by: KEITH Franklin CNP     Consent:     Consent obtained:  Verbal    Consent given by:  Patient    Risks, benefits, and alternatives were discussed: yes      Risks discussed:  Infection, pain, poor cosmetic result and poor wound healing    Alternatives discussed:  No treatment and observation  Universal protocol:     Procedure explained and questions answered to patient or proxy's satisfaction: yes      Patient identity confirmed:  Verbally with patient and arm band  Anesthesia:     Anesthesia method:  Local infiltration    Local anesthetic:  Lidocaine 1% w/o epi  Laceration details:     Location:  Hand    Hand location:  L palm    Length (cm):  1.1    Depth (mm):  3  Pre-procedure details:     Preparation:  Patient was prepped and draped in usual sterile fashion  Treatment:     Area cleansed with:  Povidone-iodine and Shur-Clens    Amount of cleaning:  Standard  Skin repair:     Repair method:  Sutures    Suture size:  3-0    Suture material:  Nylon    Suture technique:  Simple interrupted    Number of sutures:  3  Approximation:     Approximation:  Close  Repair type:     Repair type:  Simple  Post-procedure details: Dressing:  Open (no dressing)    Procedure completion:  Tolerated well, no immediate complications      FINAL IMPRESSION      1. Laceration of left hand without foreign body, initial encounter      DISPOSITION/ PLAN   DISPOSITION       Successful closure of left hand laceration using three 3-0 nylon sutures with good approximation and hemostasis achieved. Wound care instructions given to patient and verbal format.   Advised to follow-up with PCP in 1 week for suture removal.    PATIENT REFERRED TO:  Zari St MD  [facility] [address] / [address] [facility]      DISCHARGE MEDICATIONS:  New Prescriptions    No medications on file       Discontinued Medications    No medications on file       Current Discharge Medication List          KEITH Gutierrez CNP    (Please note that portions of this note were completed with a voice recognition program. Efforts were made to edit the dictations but occasionally words are mis-transcribed.)            KEITH Gutierrez CNP  03/03/23 4376

## 2023-03-23 NOTE — PROGRESS NOTES
Reported on 3/27/2023), Disp: 30 tablet, Rfl: 5    mirtazapine (REMERON) 45 MG tablet, take 1 tablet by mouth every evening (Patient not taking: Reported on 3/27/2023), Disp: 90 tablet, Rfl: 1    rOPINIRole (REQUIP) 0.5 MG tablet, take 1 tablet by mouth three times a day (Patient not taking: Reported on 3/27/2023), Disp: 90 tablet, Rfl: 0    lamoTRIgine (LAMICTAL) 100 MG tablet, Take 1 tablet by mouth daily (Patient not taking: Reported on 3/27/2023), Disp: 30 tablet, Rfl: 5    amitriptyline (ELAVIL) 10 MG tablet, take 2 tablets by mouth every evening (Patient not taking: Reported on 3/27/2023), Disp: 60 tablet, Rfl: 2    albuterol sulfate HFA (VENTOLIN HFA) 108 (90 Base) MCG/ACT inhaler, Inhale 2 puffs into the lungs every 4 hours as needed for Wheezing (Patient not taking: Reported on 3/27/2023), Disp: 1 each, Rfl: 5    omeprazole (PRILOSEC) 40 MG delayed release capsule, Take 1 capsule by mouth every morning (before breakfast) (Patient not taking: Reported on 3/27/2023), Disp: 30 capsule, Rfl: 5    Omega-3 Fatty Acids (FISH OIL) 1000 MG CAPS, Take 3,000 mg by mouth 3 times daily (Patient not taking: Reported on 3/27/2023), Disp: , Rfl:     Ascorbic Acid (STEPH-C PO), Take by mouth daily (Patient not taking: Reported on 3/27/2023), Disp: , Rfl:     No Known Allergies    Subjective:      Review of Systems   Psychiatric/Behavioral:  Positive for dysphoric mood and suicidal ideas. Negative for self-injury. The patient is nervous/anxious. Objective:     /84 (Site: Right Upper Arm, Position: Sitting, Cuff Size: Medium Adult)   Pulse 72   Temp 98.3 °F (36.8 °C) (Temporal)   Resp 16   Ht 5' 4.02\" (1.626 m)   Wt 131 lb 9.6 oz (59.7 kg)   BMI 22.58 kg/m²     Physical Exam  Vitals and nursing note reviewed. Constitutional:       General: She is not in acute distress. Appearance: She is well-developed. HENT:      Head: Normocephalic and atraumatic.       Nose: Nose normal.   Eyes:

## 2023-03-27 ENCOUNTER — OFFICE VISIT (OUTPATIENT)
Dept: FAMILY MEDICINE CLINIC | Age: 58
End: 2023-03-27
Payer: MEDICAID

## 2023-03-27 VITALS
SYSTOLIC BLOOD PRESSURE: 134 MMHG | DIASTOLIC BLOOD PRESSURE: 84 MMHG | HEART RATE: 72 BPM | TEMPERATURE: 98.3 F | BODY MASS INDEX: 22.47 KG/M2 | WEIGHT: 131.6 LBS | HEIGHT: 64 IN | RESPIRATION RATE: 16 BRPM

## 2023-03-27 DIAGNOSIS — F41.9 ANXIETY AND DEPRESSION: Primary | ICD-10-CM

## 2023-03-27 DIAGNOSIS — F32.A ANXIETY AND DEPRESSION: Primary | ICD-10-CM

## 2023-03-27 DIAGNOSIS — R45.851 SUICIDAL IDEATION: ICD-10-CM

## 2023-03-27 PROCEDURE — 99214 OFFICE O/P EST MOD 30 MIN: CPT | Performed by: FAMILY MEDICINE

## 2023-03-27 RX ORDER — FLIBANSERIN 100 MG/1
1 TABLET, FILM COATED ORAL DAILY
Qty: 30 TABLET | Refills: 1 | Status: SHIPPED | OUTPATIENT
Start: 2023-03-27

## 2023-03-27 SDOH — ECONOMIC STABILITY: HOUSING INSECURITY
IN THE LAST 12 MONTHS, WAS THERE A TIME WHEN YOU DID NOT HAVE A STEADY PLACE TO SLEEP OR SLEPT IN A SHELTER (INCLUDING NOW)?: NO

## 2023-03-27 SDOH — ECONOMIC STABILITY: FOOD INSECURITY: WITHIN THE PAST 12 MONTHS, YOU WORRIED THAT YOUR FOOD WOULD RUN OUT BEFORE YOU GOT MONEY TO BUY MORE.: NEVER TRUE

## 2023-03-27 SDOH — ECONOMIC STABILITY: FOOD INSECURITY: WITHIN THE PAST 12 MONTHS, THE FOOD YOU BOUGHT JUST DIDN'T LAST AND YOU DIDN'T HAVE MONEY TO GET MORE.: NEVER TRUE

## 2023-03-27 SDOH — ECONOMIC STABILITY: INCOME INSECURITY: HOW HARD IS IT FOR YOU TO PAY FOR THE VERY BASICS LIKE FOOD, HOUSING, MEDICAL CARE, AND HEATING?: NOT HARD AT ALL

## 2023-04-11 ENCOUNTER — TELEPHONE (OUTPATIENT)
Dept: FAMILY MEDICINE CLINIC | Age: 58
End: 2023-04-11

## 2023-04-26 ENCOUNTER — TELEPHONE (OUTPATIENT)
Dept: FAMILY MEDICINE CLINIC | Age: 58
End: 2023-04-26

## 2023-04-26 NOTE — TELEPHONE ENCOUNTER
Can offer 6/26 1130 or next in office availabe.    Patient called stating since starting amitriptyline 2 weeks ago she's had one bowel movement. Patient states she tried a laxative last not with no relief and this morning took a stool softener. Patient she has to push really hard and she is miserable. Patient asking if this is from the medication.

## 2023-04-26 NOTE — TELEPHONE ENCOUNTER
I have not seen constipation that severe with it. I'd try Miralax PLUS Milk of Magnesia to see if it helps. Can also use Colace.

## 2023-05-14 DIAGNOSIS — F41.9 ANXIETY AND DEPRESSION: ICD-10-CM

## 2023-05-14 DIAGNOSIS — F32.A ANXIETY AND DEPRESSION: ICD-10-CM

## 2023-05-15 RX ORDER — ALPRAZOLAM 0.25 MG/1
TABLET ORAL
Qty: 90 TABLET | Refills: 0 | Status: SHIPPED | OUTPATIENT
Start: 2023-05-15 | End: 2023-06-14

## 2023-06-07 NOTE — PROGRESS NOTES
[address]  [address]  Phone:  [phone]       2023    TELEHEALTH EVALUATION -- Audio/Visual (During [id number] public health emergency)    HPI:    Chidi Syed (:  1965) has requested an audio/video evaluation for the following concern(s):  migraines    She has a history of headaches and has been getting migraines for the past few weeks. She has been on Amitriptyline to help with depression and migraines and her mood has been better. She's having headaches daily. She has phonophobia, photophobia, and nausea. She is under a lot of stress with family issues. She's drinking at least 3 pops/day. She also needs new glasses. She's a  so is on her feet 8 hours a day and is having pain in both legs, sometimes so bad she can cry. It's in her knees and feels like \"intense pain going through the top and bottom of her knee\". Review of Systems   Musculoskeletal:  Positive for arthralgias. Neurological:  Positive for headaches. Negative for dizziness and light-headedness. Prior to Visit Medications    Medication Sig Taking?  Authorizing Provider   rOPINIRole (REQUIP) 0.5 MG tablet Take 1 tablet by mouth 3 times daily Yes Mookie Gamble MD   SUMAtriptan (IMITREX) 50 MG tablet Take 1 tablet by mouth once as needed for Migraine Yes Mookie Gamble MD   meloxicam (MOBIC) 7.5 MG tablet Take 1 tablet by mouth daily as needed for Pain Yes Mookie Gamble MD   ALPRAZolam Jack Boswell) 0.25 MG tablet take 1 tablet by mouth three times a day if needed anxiety  Mookie Gamble MD   megestrol (MEGACE) 40 MG tablet Take 1 tablet by mouth daily  Mookie Gamble MD   amitriptyline (ELAVIL) 25 MG tablet Take 1 tablet by mouth nightly  Mookie Gamble MD   Multiple Vitamins-Minerals (RA ONE DAILY MAXIMUM) TABS take 1 tablet by mouth once daily  Mookie Gamble MD   oxybutynin (DITROPAN-XL) 5 MG extended release tablet take 1 tablet

## 2023-06-08 ENCOUNTER — TELEMEDICINE (OUTPATIENT)
Dept: FAMILY MEDICINE CLINIC | Age: 58
End: 2023-06-08
Payer: MEDICAID

## 2023-06-08 DIAGNOSIS — M25.561 CHRONIC PAIN OF BOTH KNEES: ICD-10-CM

## 2023-06-08 DIAGNOSIS — R51.9 NEW ONSET HEADACHE: Primary | ICD-10-CM

## 2023-06-08 DIAGNOSIS — R51.9 CHRONIC DAILY HEADACHE: ICD-10-CM

## 2023-06-08 DIAGNOSIS — G89.29 CHRONIC PAIN OF BOTH KNEES: ICD-10-CM

## 2023-06-08 DIAGNOSIS — M25.562 CHRONIC PAIN OF BOTH KNEES: ICD-10-CM

## 2023-06-08 PROCEDURE — 99214 OFFICE O/P EST MOD 30 MIN: CPT | Performed by: FAMILY MEDICINE

## 2023-06-08 RX ORDER — SUMATRIPTAN 50 MG/1
50 TABLET, FILM COATED ORAL
Qty: 9 TABLET | Refills: 2 | Status: SHIPPED | OUTPATIENT
Start: 2023-06-08 | End: 2023-06-08

## 2023-06-08 RX ORDER — MELOXICAM 7.5 MG/1
7.5 TABLET ORAL DAILY PRN
Qty: 90 TABLET | Refills: 1 | Status: SHIPPED | OUTPATIENT
Start: 2023-06-08

## 2023-06-08 RX ORDER — ROPINIROLE 0.5 MG/1
0.5 TABLET, FILM COATED ORAL 3 TIMES DAILY
Qty: 90 TABLET | Refills: 2 | Status: SHIPPED | OUTPATIENT
Start: 2023-06-08

## 2023-06-15 ENCOUNTER — HOSPITAL ENCOUNTER (OUTPATIENT)
Age: 58
Discharge: HOME | End: 2023-06-15
Payer: MEDICAID

## 2023-06-15 DIAGNOSIS — R51.9 NEW ONSET HEADACHE: ICD-10-CM

## 2023-06-15 LAB
ALBUMIN SERPL BCG-MCNC: 4.2 G/DL (ref 3.5–5.1)
ALP SERPL-CCNC: 91 U/L (ref 38–126)
ALT SERPL W/O P-5'-P-CCNC: 10 U/L (ref 11–66)
ANION GAP SERPL CALC-SCNC: 10 MEQ/L (ref 8–16)
AST SERPL-CCNC: 18 U/L (ref 5–40)
BASOPHILS ABSOLUTE: 0.1 THOU/MM3 (ref 0–0.1)
BASOPHILS NFR BLD AUTO: 0.8 %
BILIRUB SERPL-MCNC: 0.3 MG/DL (ref 0.3–1.2)
BUN SERPL-MCNC: 10 MG/DL (ref 7–22)
CALCIUM SERPL-MCNC: 9 MG/DL (ref 8.5–10.5)
CHLORIDE SERPL-SCNC: 105 MEQ/L (ref 98–111)
CHOLEST SERPL-MCNC: 187 MG/DL (ref 100–199)
CO2 SERPL-SCNC: 22 MEQ/L (ref 23–33)
CREAT SERPL-MCNC: 0.7 MG/DL (ref 0.4–1.2)
DEPRECATED RDW RBC AUTO: 45.9 FL (ref 35–45)
EOSINOPHIL NFR BLD AUTO: 1.4 %
EOSINOPHILS ABSOLUTE: 0.1 THOU/MM3 (ref 0–0.4)
ERYTHROCYTE [DISTWIDTH] IN BLOOD BY AUTOMATED COUNT: 13.5 % (ref 11.5–14.5)
ERYTHROCYTE [SEDIMENTATION RATE] IN BLOOD BY WESTERGREN METHOD: 8 MM/HR (ref 0–20)
GFR SERPL CREATININE-BSD FRML MDRD: > 60 ML/MIN/1.73M2
GLUCOSE SERPL-MCNC: 92 MG/DL (ref 70–108)
HCT VFR BLD AUTO: 41.6 % (ref 37–47)
HDLC SERPL-MCNC: 49 MG/DL
HGB BLD-MCNC: 13 GM/DL (ref 12–16)
IMM GRANULOCYTES # BLD AUTO: 0.04 THOU/MM3 (ref 0–0.07)
IMM GRANULOCYTES NFR BLD AUTO: 0.5 %
LDLC SERPL CALC-MCNC: 119 MG/DL
LYMPHOCYTES ABSOLUTE: 2.7 THOU/MM3 (ref 1–4.8)
LYMPHOCYTES NFR BLD AUTO: 32.9 %
MCH RBC QN AUTO: 29.1 PG (ref 26–33)
MCHC RBC AUTO-ENTMCNC: 31.3 GM/DL (ref 32.2–35.5)
MCV RBC AUTO: 93.3 FL (ref 81–99)
MONOCYTES ABSOLUTE: 0.4 THOU/MM3 (ref 0.4–1.3)
MONOCYTES NFR BLD AUTO: 4.8 %
NEUTROPHILS NFR BLD AUTO: 59.6 %
NRBC BLD AUTO-RTO: 0 /100 WBC
PLATELET # BLD AUTO: 296 THOU/MM3 (ref 130–400)
PMV BLD AUTO: 9.2 FL (ref 9.4–12.4)
POTASSIUM SERPL-SCNC: 3.9 MEQ/L (ref 3.5–5.2)
PROT SERPL-MCNC: 6.9 G/DL (ref 6.1–8)
RBC # BLD AUTO: 4.46 MILL/MM3 (ref 4.2–5.4)
SEGMENTED NEUTROPHILS ABSOLUTE COUNT: 4.9 THOU/MM3 (ref 1.8–7.7)
SODIUM SERPL-SCNC: 137 MEQ/L (ref 135–145)
TRIGL SERPL-MCNC: 95 MG/DL (ref 0–199)
TSH SERPL DL<=0.005 MIU/L-ACNC: 1.98 UIU/ML (ref 0.4–4.2)
WBC # BLD AUTO: 8.3 THOU/MM3 (ref 4.8–10.8)

## 2023-06-15 PROCEDURE — 36415 COLL VENOUS BLD VENIPUNCTURE: CPT

## 2023-06-15 PROCEDURE — 80061 LIPID PANEL: CPT

## 2023-06-15 PROCEDURE — 80053 COMPREHEN METABOLIC PANEL: CPT

## 2023-06-15 PROCEDURE — 84443 ASSAY THYROID STIM HORMONE: CPT

## 2023-06-15 PROCEDURE — 85651 RBC SED RATE NONAUTOMATED: CPT

## 2023-06-15 PROCEDURE — 85025 COMPLETE CBC W/AUTO DIFF WBC: CPT

## 2023-06-28 ENCOUNTER — TELEPHONE (OUTPATIENT)
Dept: FAMILY MEDICINE CLINIC | Age: 58
End: 2023-06-28

## 2023-07-06 DIAGNOSIS — F51.04 PSYCHOPHYSIOLOGICAL INSOMNIA: ICD-10-CM

## 2023-07-07 RX ORDER — MIRTAZAPINE 45 MG/1
TABLET, FILM COATED ORAL
Qty: 90 TABLET | Refills: 1 | Status: SHIPPED | OUTPATIENT
Start: 2023-07-07

## 2023-08-03 DIAGNOSIS — M54.42 CHRONIC MIDLINE LOW BACK PAIN WITH BILATERAL SCIATICA: ICD-10-CM

## 2023-08-03 DIAGNOSIS — G89.29 CHRONIC MIDLINE LOW BACK PAIN WITH BILATERAL SCIATICA: ICD-10-CM

## 2023-08-03 DIAGNOSIS — M54.41 CHRONIC MIDLINE LOW BACK PAIN WITH BILATERAL SCIATICA: ICD-10-CM

## 2023-08-03 RX ORDER — HYDROCODONE BITARTRATE AND ACETAMINOPHEN 5; 325 MG/1; MG/1
1 TABLET ORAL EVERY 8 HOURS PRN
Qty: 15 TABLET | Refills: 0 | Status: SHIPPED | OUTPATIENT
Start: 2023-08-03 | End: 2023-08-08

## 2023-09-11 DIAGNOSIS — F41.9 ANXIETY AND DEPRESSION: ICD-10-CM

## 2023-09-11 DIAGNOSIS — F32.A ANXIETY AND DEPRESSION: ICD-10-CM

## 2023-09-11 DIAGNOSIS — R51.9 CHRONIC DAILY HEADACHE: ICD-10-CM

## 2023-09-11 RX ORDER — AMITRIPTYLINE HYDROCHLORIDE 25 MG/1
25 TABLET, FILM COATED ORAL NIGHTLY
Qty: 90 TABLET | Refills: 1 | Status: SHIPPED | OUTPATIENT
Start: 2023-09-11 | End: 2023-09-11 | Stop reason: SDUPTHER

## 2023-10-10 DIAGNOSIS — J44.1 COPD EXACERBATION (HCC): ICD-10-CM

## 2023-10-10 DIAGNOSIS — G89.29 CHRONIC MIDLINE LOW BACK PAIN WITH BILATERAL SCIATICA: ICD-10-CM

## 2023-10-10 DIAGNOSIS — M54.41 CHRONIC MIDLINE LOW BACK PAIN WITH BILATERAL SCIATICA: ICD-10-CM

## 2023-10-10 DIAGNOSIS — M54.42 CHRONIC MIDLINE LOW BACK PAIN WITH BILATERAL SCIATICA: ICD-10-CM

## 2023-10-10 RX ORDER — HYDROCODONE BITARTRATE AND ACETAMINOPHEN 5; 325 MG/1; MG/1
1 TABLET ORAL EVERY 8 HOURS PRN
Qty: 10 TABLET | Refills: 0 | Status: SHIPPED | OUTPATIENT
Start: 2023-10-10 | End: 2023-10-15

## 2023-10-30 ENCOUNTER — HOSPITAL ENCOUNTER (OUTPATIENT)
Dept: MAMMOGRAPHY | Age: 58
Discharge: HOME | End: 2023-10-30
Payer: MEDICAID

## 2023-10-30 VITALS — WEIGHT: 131.61 LBS | HEIGHT: 64 IN | BODY MASS INDEX: 22.47 KG/M2

## 2023-10-30 DIAGNOSIS — Z12.31 VISIT FOR SCREENING MAMMOGRAM: ICD-10-CM

## 2023-10-30 PROCEDURE — 77063 BREAST TOMOSYNTHESIS BI: CPT

## 2023-11-08 ENCOUNTER — HOSPITAL ENCOUNTER (EMERGENCY)
Age: 58
Discharge: HOME | End: 2023-11-08
Payer: MEDICAID

## 2023-11-08 ENCOUNTER — APPOINTMENT (OUTPATIENT)
Dept: GENERAL RADIOLOGY | Age: 58
End: 2023-11-08
Payer: MEDICAID

## 2023-11-08 VITALS
DIASTOLIC BLOOD PRESSURE: 73 MMHG | TEMPERATURE: 97.8 F | SYSTOLIC BLOOD PRESSURE: 116 MMHG | HEART RATE: 99 BPM | RESPIRATION RATE: 18 BRPM | OXYGEN SATURATION: 98 %

## 2023-11-08 DIAGNOSIS — S60.00XA CONTUSION OF RIGHT HAND INCLUDING FINGERS, INITIAL ENCOUNTER: Primary | ICD-10-CM

## 2023-11-08 DIAGNOSIS — S60.221A CONTUSION OF RIGHT HAND INCLUDING FINGERS, INITIAL ENCOUNTER: Primary | ICD-10-CM

## 2023-11-08 PROCEDURE — 96372 THER/PROPH/DIAG INJ SC/IM: CPT

## 2023-11-08 PROCEDURE — 6360000002 HC RX W HCPCS: Performed by: NURSE PRACTITIONER

## 2023-11-08 PROCEDURE — 73130 X-RAY EXAM OF HAND: CPT

## 2023-11-08 PROCEDURE — 99213 OFFICE O/P EST LOW 20 MIN: CPT | Performed by: NURSE PRACTITIONER

## 2023-11-08 PROCEDURE — 99214 OFFICE O/P EST MOD 30 MIN: CPT

## 2023-11-08 RX ORDER — KETOROLAC TROMETHAMINE 30 MG/ML
60 INJECTION, SOLUTION INTRAMUSCULAR; INTRAVENOUS ONCE
Status: COMPLETED | OUTPATIENT
Start: 2023-11-08 | End: 2023-11-08

## 2023-11-08 RX ORDER — KETOROLAC TROMETHAMINE 10 MG/1
10 TABLET, FILM COATED ORAL EVERY 6 HOURS PRN
Qty: 20 TABLET | Refills: 0 | Status: SHIPPED | OUTPATIENT
Start: 2023-11-08

## 2023-11-08 RX ADMIN — KETOROLAC TROMETHAMINE 60 MG: 60 INJECTION, SOLUTION INTRAMUSCULAR at 08:37

## 2023-11-08 ASSESSMENT — ENCOUNTER SYMPTOMS
TROUBLE SWALLOWING: 0
SHORTNESS OF BREATH: 0
SORE THROAT: 0
DIARRHEA: 0
VOMITING: 0
RHINORRHEA: 0
NAUSEA: 0
COUGH: 0
EYE DISCHARGE: 0
EYE REDNESS: 0

## 2023-11-08 ASSESSMENT — PAIN - FUNCTIONAL ASSESSMENT
PAIN_FUNCTIONAL_ASSESSMENT: ACTIVITIES ARE NOT PREVENTED
PAIN_FUNCTIONAL_ASSESSMENT: 0-10

## 2023-11-08 ASSESSMENT — PAIN DESCRIPTION - PAIN TYPE: TYPE: ACUTE PAIN

## 2023-11-08 ASSESSMENT — PAIN DESCRIPTION - DESCRIPTORS
DESCRIPTORS: SHARP
DESCRIPTORS: SHARP

## 2023-11-08 ASSESSMENT — PAIN DESCRIPTION - ORIENTATION
ORIENTATION: RIGHT
ORIENTATION: RIGHT

## 2023-11-08 ASSESSMENT — PAIN SCALES - GENERAL
PAINLEVEL_OUTOF10: 10
PAINLEVEL_OUTOF10: 8

## 2023-11-08 ASSESSMENT — PAIN DESCRIPTION - LOCATION
LOCATION: HAND
LOCATION: HAND

## 2023-11-08 ASSESSMENT — PAIN DESCRIPTION - FREQUENCY: FREQUENCY: INTERMITTENT

## 2023-11-08 ASSESSMENT — PAIN DESCRIPTION - ONSET: ONSET: GRADUAL

## 2023-11-08 NOTE — ED NOTES
Right hand placed in wrist splint applied, pt instructed on use and verbalized understanding of info given. No adverse medication reaction noted with meds given. Discharge instructions and prescriptions discussed with pt. Pt. Verbalized understanding of info given and ambulated to exit in stable condition.        Kanika Cooley RN  11/08/23 5693

## 2023-11-08 NOTE — DISCHARGE INSTRUCTIONS
Take medications as prescribed. You may use Tylenol or Motrin per package instructions for pain. You may use  ice for 15 minutes 2-3 times daily as needed for comfort. Seek emergency treatment for fever greater than 101.5 for greater than 3 days, increased pain, or new or unusual symptoms.

## 2023-11-13 DIAGNOSIS — F41.9 ANXIETY AND DEPRESSION: ICD-10-CM

## 2023-11-13 DIAGNOSIS — F32.A ANXIETY AND DEPRESSION: ICD-10-CM

## 2023-11-13 RX ORDER — ALPRAZOLAM 0.25 MG/1
TABLET ORAL
Qty: 90 TABLET | Refills: 0 | Status: SHIPPED | OUTPATIENT
Start: 2023-11-13 | End: 2023-12-13

## 2023-11-27 ENCOUNTER — TELEPHONE (OUTPATIENT)
Dept: FAMILY MEDICINE CLINIC | Age: 58
End: 2023-11-27

## 2023-12-04 NOTE — PROGRESS NOTES
[address] [address]  Phone:  [phone]          Name: Michael Woodward  : 1965    Chief Complaint   Patient presents with    Knee Pain     Bilateral knee pain worsening over the past few months. Dizziness     When [place] decorations        HPI:     Michael Woodward is a 63 y.o. female who presents today for evaluation of knee pain and dizziness. She's had bilateral knee pain for the past few months with no inciting injury. She feels like there are ice picks through her knees. She is on her feet all day for work. Her knees are not giving out. No associated numbness or tingling. When she was putting up her [place] decorations, she felt dizzy when reaching up. She also has dizziness when rolling over in bed or when picking something up off the floor. No chest pain or palpitations. She has been having pain with intercourse, even with lubrication. She states it feels like barbed wire rubbing on her.       Current Outpatient Medications:     ALPRAZolam (XANAX) 0.25 MG tablet, take 1 tablet by mouth three times a day if needed anxiety, Disp: 90 tablet, Rfl: 0    ketorolac (TORADOL) 10 MG tablet, Take 1 tablet by mouth every 6 hours as needed for Pain Hold Meloxicam while taking., Disp: 20 tablet, Rfl: 0    amitriptyline (ELAVIL) 50 MG tablet, Take 1 tablet by mouth nightly, Disp: 90 tablet, Rfl: 0    mirtazapine (REMERON) 45 MG tablet, take 1 tablet by mouth every evening, Disp: 90 tablet, Rfl: 1    rOPINIRole (REQUIP) 0.5 MG tablet, Take 1 tablet by mouth 3 times daily, Disp: 90 tablet, Rfl: 2    meloxicam (MOBIC) 7.5 MG tablet, Take 1 tablet by mouth daily as needed for Pain, Disp: 90 tablet, Rfl: 1    megestrol (MEGACE) 40 MG tablet, Take 1 tablet by mouth daily, Disp: 90 tablet, Rfl: 1    Multiple Vitamins-Minerals (RA ONE DAILY MAXIMUM) TABS, take 1 tablet by mouth once daily, Disp: 90 tablet, Rfl: 1    oxybutynin (DITROPAN-XL) 5

## 2023-12-06 ENCOUNTER — TELEPHONE (OUTPATIENT)
Dept: FAMILY MEDICINE CLINIC | Age: 58
End: 2023-12-06

## 2023-12-06 ENCOUNTER — OFFICE VISIT (OUTPATIENT)
Dept: FAMILY MEDICINE CLINIC | Age: 58
End: 2023-12-06
Payer: MEDICAID

## 2023-12-06 VITALS
SYSTOLIC BLOOD PRESSURE: 118 MMHG | WEIGHT: 128 LBS | DIASTOLIC BLOOD PRESSURE: 70 MMHG | RESPIRATION RATE: 16 BRPM | TEMPERATURE: 97.5 F | BODY MASS INDEX: 21.96 KG/M2 | HEART RATE: 72 BPM

## 2023-12-06 DIAGNOSIS — M25.561 CHRONIC PAIN OF BOTH KNEES: ICD-10-CM

## 2023-12-06 DIAGNOSIS — M25.562 CHRONIC PAIN OF BOTH KNEES: ICD-10-CM

## 2023-12-06 DIAGNOSIS — G89.29 CHRONIC PAIN OF BOTH KNEES: ICD-10-CM

## 2023-12-06 DIAGNOSIS — Z12.4 PAP SMEAR FOR CERVICAL CANCER SCREENING: ICD-10-CM

## 2023-12-06 DIAGNOSIS — N94.10 DYSPAREUNIA IN FEMALE: ICD-10-CM

## 2023-12-06 DIAGNOSIS — H81.13 BENIGN PAROXYSMAL POSITIONAL VERTIGO DUE TO BILATERAL VESTIBULAR DISORDER: Primary | ICD-10-CM

## 2023-12-06 PROCEDURE — 99214 OFFICE O/P EST MOD 30 MIN: CPT | Performed by: FAMILY MEDICINE

## 2023-12-07 DIAGNOSIS — M54.42 CHRONIC MIDLINE LOW BACK PAIN WITH BILATERAL SCIATICA: ICD-10-CM

## 2023-12-07 DIAGNOSIS — G89.29 CHRONIC MIDLINE LOW BACK PAIN WITH BILATERAL SCIATICA: ICD-10-CM

## 2023-12-07 DIAGNOSIS — M54.41 CHRONIC MIDLINE LOW BACK PAIN WITH BILATERAL SCIATICA: ICD-10-CM

## 2023-12-07 RX ORDER — HYDROCODONE BITARTRATE AND ACETAMINOPHEN 5; 325 MG/1; MG/1
1 TABLET ORAL EVERY 8 HOURS PRN
Qty: 15 TABLET | Refills: 0 | Status: SHIPPED | OUTPATIENT
Start: 2023-12-07 | End: 2023-12-12

## 2023-12-12 ENCOUNTER — HOSPITAL ENCOUNTER (EMERGENCY)
Age: 58
Discharge: HOME | End: 2023-12-12
Payer: MEDICAID

## 2023-12-12 VITALS
HEIGHT: 64 IN | RESPIRATION RATE: 18 BRPM | HEART RATE: 112 BPM | BODY MASS INDEX: 21.85 KG/M2 | SYSTOLIC BLOOD PRESSURE: 101 MMHG | WEIGHT: 128 LBS | DIASTOLIC BLOOD PRESSURE: 69 MMHG | OXYGEN SATURATION: 96 % | TEMPERATURE: 97.4 F

## 2023-12-12 DIAGNOSIS — J06.9 VIRAL URI: Primary | ICD-10-CM

## 2023-12-12 LAB — SARS-COV-2 RDRP RESP QL NAA+PROBE: NOT  DETECTED

## 2023-12-12 PROCEDURE — 87635 SARS-COV-2 COVID-19 AMP PRB: CPT

## 2023-12-12 PROCEDURE — 99213 OFFICE O/P EST LOW 20 MIN: CPT | Performed by: NURSE PRACTITIONER

## 2023-12-12 PROCEDURE — 99213 OFFICE O/P EST LOW 20 MIN: CPT

## 2023-12-12 ASSESSMENT — PAIN DESCRIPTION - DESCRIPTORS: DESCRIPTORS: ACHING

## 2023-12-12 ASSESSMENT — PAIN - FUNCTIONAL ASSESSMENT
PAIN_FUNCTIONAL_ASSESSMENT: 0-10
PAIN_FUNCTIONAL_ASSESSMENT: ACTIVITIES ARE NOT PREVENTED

## 2023-12-12 ASSESSMENT — PAIN DESCRIPTION - ONSET: ONSET: ON-GOING

## 2023-12-12 ASSESSMENT — PAIN DESCRIPTION - FREQUENCY: FREQUENCY: CONTINUOUS

## 2023-12-12 ASSESSMENT — PAIN SCALES - GENERAL: PAINLEVEL_OUTOF10: 9

## 2023-12-12 ASSESSMENT — ENCOUNTER SYMPTOMS
COUGH: 1
SHORTNESS OF BREATH: 0
SORE THROAT: 0

## 2023-12-12 ASSESSMENT — PAIN DESCRIPTION - LOCATION: LOCATION: HEAD

## 2023-12-12 ASSESSMENT — PAIN DESCRIPTION - PAIN TYPE: TYPE: ACUTE PAIN

## 2023-12-12 NOTE — ED NOTES
Discharge instructions  reviewed with pt. Pt verbalized understanding. Pt ambulated out in stable condition. Assessment unchanged upon discharge.      Blanche Ness RN  12/12/23 0585

## 2023-12-13 ENCOUNTER — TELEPHONE (OUTPATIENT)
Dept: FAMILY MEDICINE CLINIC | Age: 58
End: 2023-12-13

## 2023-12-14 ENCOUNTER — TELEMEDICINE (OUTPATIENT)
Dept: FAMILY MEDICINE CLINIC | Age: 58
End: 2023-12-14
Payer: MEDICAID

## 2023-12-14 ENCOUNTER — HOSPITAL ENCOUNTER (EMERGENCY)
Age: 58
Discharge: HOME | End: 2023-12-14
Payer: MEDICAID

## 2023-12-14 VITALS
OXYGEN SATURATION: 100 % | RESPIRATION RATE: 16 BRPM | DIASTOLIC BLOOD PRESSURE: 70 MMHG | SYSTOLIC BLOOD PRESSURE: 96 MMHG | HEART RATE: 87 BPM | WEIGHT: 128.2 LBS | TEMPERATURE: 99.1 F | BODY MASS INDEX: 22.01 KG/M2

## 2023-12-14 DIAGNOSIS — R53.83 FATIGUE, UNSPECIFIED TYPE: ICD-10-CM

## 2023-12-14 DIAGNOSIS — J06.9 VIRAL URI: Primary | ICD-10-CM

## 2023-12-14 LAB
FLUAV AG SPEC QL: NEGATIVE
FLUBV AG SPEC QL: NEGATIVE

## 2023-12-14 PROCEDURE — 99213 OFFICE O/P EST LOW 20 MIN: CPT | Performed by: NURSE PRACTITIONER

## 2023-12-14 PROCEDURE — 99213 OFFICE O/P EST LOW 20 MIN: CPT | Performed by: FAMILY MEDICINE

## 2023-12-14 PROCEDURE — 87804 INFLUENZA ASSAY W/OPTIC: CPT

## 2023-12-14 PROCEDURE — 99213 OFFICE O/P EST LOW 20 MIN: CPT

## 2023-12-14 RX ORDER — OSELTAMIVIR PHOSPHATE 75 MG/1
75 CAPSULE ORAL 2 TIMES DAILY
Qty: 10 CAPSULE | Refills: 0 | Status: SHIPPED | OUTPATIENT
Start: 2023-12-14 | End: 2023-12-19

## 2023-12-14 RX ORDER — METHYLPREDNISOLONE 4 MG/1
TABLET ORAL
Qty: 1 KIT | Refills: 0 | Status: SHIPPED | OUTPATIENT
Start: 2023-12-14 | End: 2023-12-20

## 2023-12-14 ASSESSMENT — PAIN SCALES - GENERAL: PAINLEVEL_OUTOF10: 8

## 2023-12-14 ASSESSMENT — PAIN DESCRIPTION - LOCATION: LOCATION: GENERALIZED

## 2023-12-14 ASSESSMENT — PAIN - FUNCTIONAL ASSESSMENT: PAIN_FUNCTIONAL_ASSESSMENT: 0-10

## 2023-12-14 NOTE — ED NOTES
Pt nose swabbed for influenza. Pt tolerated well. Specimen taken to lab.       Toyin Vergara RN  12/14/23 2027

## 2023-12-14 NOTE — PROGRESS NOTES
[address] [address]  [address] [facility]  [facility]  Phone:  [phone]       2023    TELEHEALTH EVALUATION    HPI:    Elisa Hayes (:  1965) has requested an audio/video evaluation for the following concern(s):  cough, body aches, fever    Denise was seen at [facility] on 23 with cough, headache/migraine, body ache, chills, and fatigue for 2 days. She tried Tylenol, Tessalon, Dayquil, and Nyquil without relief. Her COVID test was negative, but she was not tested for influenza. She got OTC sinus medication that improved her headache. She also has diarrhea with it and has only been eating chicken noodle soup. Review of Systems   Constitutional:  Positive for chills, fatigue and fever. HENT:  Positive for congestion and sore throat. Respiratory:  Positive for cough. Negative for shortness of breath. Gastrointestinal:  Positive for diarrhea. Negative for constipation. Musculoskeletal:  Positive for arthralgias. Neurological:  Positive for headaches. Prior to Visit Medications    Medication Sig Taking? Authorizing Provider   methylPREDNISolone (MEDROL DOSEPACK) 4 MG tablet Take by mouth. Yes Vianney Hernandez MD   oseltamivir (TAMIFLU) 75 MG capsule Take 1 capsule by mouth 2 times daily for 5 days Yes Vianney Hernandez MD   Potassium 99 MG TABS Take by mouth  Provider, MD Melina   ketorolac (TORADOL) 10 MG tablet Take 1 tablet by mouth every 6 hours as needed for Pain Hold Meloxicam while taking.   Elizabeth KHAN, APRN - CNP   amitriptyline (ELAVIL) 50 MG tablet Take 1 tablet by mouth nightly  Vianney Hernandez MD   mirtazapine (REMERON) 45 MG tablet take 1 tablet by mouth every evening  Vianney Hernandez MD   rOPINIRole (REQUIP) 0.5 MG tablet Take 1 tablet by mouth 3 times daily  Vianney Hernandez MD   SUMAtriptan (IMITREX) 50 MG tablet Take 1 tablet by mouth once as needed for Migraine  Vianney Hernandez MD

## 2023-12-14 NOTE — ED TRIAGE NOTES
Walter Chappell arrives to room with complaint of  cough, body aches, fatigue  symptoms started 4 days ago.

## 2023-12-15 ENCOUNTER — TELEPHONE (OUTPATIENT)
Dept: FAMILY MEDICINE CLINIC | Age: 58
End: 2023-12-15

## 2023-12-19 ENCOUNTER — HOSPITAL ENCOUNTER (OUTPATIENT)
Age: 58
Discharge: HOME | End: 2023-12-19
Payer: MEDICAID

## 2023-12-28 ENCOUNTER — TELEPHONE (OUTPATIENT)
Dept: FAMILY MEDICINE CLINIC | Age: 58
End: 2023-12-28

## 2023-12-28 RX ORDER — FLUCONAZOLE 150 MG/1
150 TABLET ORAL
Qty: 2 TABLET | Refills: 0 | Status: SHIPPED | OUTPATIENT
Start: 2023-12-28 | End: 2024-01-03

## 2023-12-29 ENCOUNTER — HOSPITAL ENCOUNTER (EMERGENCY)
Age: 58
Discharge: HOME | End: 2023-12-29
Payer: MEDICAID

## 2023-12-29 VITALS
OXYGEN SATURATION: 99 % | TEMPERATURE: 97.9 F | RESPIRATION RATE: 18 BRPM | HEART RATE: 101 BPM | SYSTOLIC BLOOD PRESSURE: 127 MMHG | DIASTOLIC BLOOD PRESSURE: 88 MMHG

## 2023-12-29 DIAGNOSIS — B37.31 VAGINAL YEAST INFECTION: Primary | ICD-10-CM

## 2023-12-29 PROCEDURE — 99213 OFFICE O/P EST LOW 20 MIN: CPT

## 2023-12-29 ASSESSMENT — PAIN SCALES - GENERAL: PAINLEVEL_OUTOF10: 6

## 2023-12-29 ASSESSMENT — PAIN - FUNCTIONAL ASSESSMENT
PAIN_FUNCTIONAL_ASSESSMENT: ACTIVITIES ARE NOT PREVENTED
PAIN_FUNCTIONAL_ASSESSMENT: 0-10

## 2023-12-29 ASSESSMENT — PAIN DESCRIPTION - ONSET: ONSET: PROGRESSIVE

## 2023-12-29 ASSESSMENT — PAIN DESCRIPTION - FREQUENCY: FREQUENCY: INTERMITTENT

## 2023-12-29 ASSESSMENT — PAIN DESCRIPTION - LOCATION: LOCATION: VAGINA

## 2023-12-29 ASSESSMENT — PAIN DESCRIPTION - DESCRIPTORS: DESCRIPTORS: ITCHING

## 2023-12-29 ASSESSMENT — PAIN DESCRIPTION - PAIN TYPE: TYPE: ACUTE PAIN

## 2023-12-29 NOTE — ED PROVIDER NOTES
[address] [facility]  Urgent Care Encounter       CHIEF COMPLAINT       Chief Complaint   Patient presents with    Vaginitis     Itching and discomfort - needs work note as well          Nurses Notes reviewed and I agree except as noted in the HPI. HISTORY OF PRESENT ILLNESS   Chanda Esquivel is a 63 y.o. female who presents with complaints of vaginal itching, burning and discharge. Reports that she was recently on antibiotics. Reports notifying pcp yesterday and they prescribed diflucan and she took the first pill yesterday. Reports that she does not feel better and needs a work note because she \"cannot be digging in my crotch at work at [employer] Pt also reports \"I just want to scoot across the carpet\". The history is provided by the patient. REVIEW OF SYSTEMS     Review of Systems   Genitourinary:  Positive for vaginal discharge (and itching). All other systems reviewed and are negative. PAST MEDICAL HISTORY         Diagnosis Date    COPD (chronic obstructive pulmonary disease) ([facility])     GERD (gastroesophageal reflux disease)     Schizoaffective disorder, bipolar type with good prognostic features ([facility])     TIA (transient ischemic attack)        SURGICALHISTORY     Patient  has a past surgical history that includes Tonsillectomy; Tubal ligation; Appendectomy; Carpal tunnel release (Right, 03/2019); and pr bx of breast,vacuum asst,image guide (Left).     CURRENT MEDICATIONS       Discharge Medication List as of 12/29/2023  9:59 AM        CONTINUE these medications which have NOT CHANGED    Details   fluconazole (DIFLUCAN) 150 MG tablet Take 1 tablet by mouth every 72 hours for 6 days, Disp-2 tablet, R-0Normal      Potassium 99 MG TABS Take by mouthHistorical Med      amitriptyline (ELAVIL) 50 MG tablet Take 1 tablet by mouth nightly, Disp-90 tablet, R-0CORRECT RXNormal      mirtazapine (REMERON) 45 MG tablet take 1 tablet by mouth every evening, Disp-90 tablet, R-1Normal

## 2024-01-02 ENCOUNTER — TELEPHONE (OUTPATIENT)
Dept: FAMILY MEDICINE CLINIC | Age: 59
End: 2024-01-02

## 2024-03-29 ENCOUNTER — HOSPITAL ENCOUNTER (EMERGENCY)
Age: 59
Discharge: HOME | End: 2024-03-29
Payer: MEDICAID

## 2024-03-29 VITALS
SYSTOLIC BLOOD PRESSURE: 156 MMHG | OXYGEN SATURATION: 97 % | BODY MASS INDEX: 21.68 KG/M2 | HEART RATE: 88 BPM | HEIGHT: 64 IN | WEIGHT: 127 LBS | DIASTOLIC BLOOD PRESSURE: 86 MMHG | RESPIRATION RATE: 16 BRPM | TEMPERATURE: 98.6 F

## 2024-03-29 DIAGNOSIS — T14.8XXA WOUND INFECTION: Primary | ICD-10-CM

## 2024-03-29 DIAGNOSIS — L08.9 WOUND INFECTION: Primary | ICD-10-CM

## 2024-03-29 PROCEDURE — 6360000002 HC RX W HCPCS: Performed by: NURSE PRACTITIONER

## 2024-03-29 PROCEDURE — 90471 IMMUNIZATION ADMIN: CPT | Performed by: NURSE PRACTITIONER

## 2024-03-29 PROCEDURE — 99213 OFFICE O/P EST LOW 20 MIN: CPT

## 2024-03-29 PROCEDURE — 90715 TDAP VACCINE 7 YRS/> IM: CPT | Performed by: NURSE PRACTITIONER

## 2024-03-29 PROCEDURE — 99213 OFFICE O/P EST LOW 20 MIN: CPT | Performed by: NURSE PRACTITIONER

## 2024-03-29 RX ORDER — CEPHALEXIN 500 MG/1
500 CAPSULE ORAL 3 TIMES DAILY
Qty: 21 CAPSULE | Refills: 0 | Status: SHIPPED | OUTPATIENT
Start: 2024-03-29 | End: 2024-04-05

## 2024-03-29 RX ADMIN — TETANUS TOXOID, REDUCED DIPHTHERIA TOXOID AND ACELLULAR PERTUSSIS VACCINE, ADSORBED 0.5 ML: 5; 2.5; 8; 8; 2.5 SUSPENSION INTRAMUSCULAR at 16:49

## 2024-03-29 ASSESSMENT — ENCOUNTER SYMPTOMS
VOMITING: 0
NAUSEA: 0
CHEST TIGHTNESS: 0
DIARRHEA: 0
COUGH: 0
RHINORRHEA: 0
SORE THROAT: 0
SHORTNESS OF BREATH: 0

## 2024-03-29 ASSESSMENT — PAIN DESCRIPTION - LOCATION: LOCATION: LEG

## 2024-03-29 ASSESSMENT — PAIN - FUNCTIONAL ASSESSMENT
PAIN_FUNCTIONAL_ASSESSMENT: 0-10
PAIN_FUNCTIONAL_ASSESSMENT: PREVENTS OR INTERFERES WITH MANY ACTIVE NOT PASSIVE ACTIVITIES

## 2024-03-29 ASSESSMENT — PAIN DESCRIPTION - ONSET: ONSET: PROGRESSIVE

## 2024-03-29 ASSESSMENT — PAIN DESCRIPTION - FREQUENCY: FREQUENCY: CONTINUOUS

## 2024-03-29 ASSESSMENT — PAIN SCALES - GENERAL: PAINLEVEL_OUTOF10: 10

## 2024-03-29 ASSESSMENT — PAIN DESCRIPTION - PAIN TYPE: TYPE: ACUTE PAIN

## 2024-03-29 ASSESSMENT — PAIN DESCRIPTION - DESCRIPTORS: DESCRIPTORS: DISCOMFORT

## 2024-03-29 ASSESSMENT — PAIN DESCRIPTION - ORIENTATION: ORIENTATION: LEFT

## 2024-03-29 NOTE — ED TRIAGE NOTES
Pt to urgent care due to a 2.5cm burn that is infected. Pt states she was walking her dog when she dropped the leasch and the leash rubbed across her lower left leg causing the injury.

## 2024-03-29 NOTE — ED NOTES
Pt received her injection, but did not want to wait the 20 min afterwards due to having this to due at home. Discharge instructions and prescriptions reviewed with pt. Pt verbalized understanding. Pt ambulated out in stable condition.  Assessment unchanged upon discharge.     Giuliana Johnson RN  03/29/24 2067

## 2024-03-29 NOTE — ED PROVIDER NOTES
[facility]  Urgent Care Encounter       CHIEF COMPLAINT       Chief Complaint   Patient presents with    Burn    Wound Infection     Left lower leg       Nurses Notes reviewed and I agree except as noted in the HPI.  HISTORY OF PRESENT ILLNESS   Denise Benoit is a 58 y.o. female who presents to urgent care for evaluation of wound on the left lower extremity.  Patient states that she was walking her dog 3 days ago and dropped the leash and gave her a laceration on the left leg.  She has been cleansing the wound with hydrogen peroxide and Neosporin at home.  Patient reports pain and erythema to the site, but denies any fevers.  She does not have any numbness or tingling to the affected extremity.  She denies any other complaints at this time    The history is provided by the patient.       REVIEW OF SYSTEMS     Review of Systems   Constitutional:  Negative for activity change, appetite change, chills, fatigue and fever.   HENT:  Negative for ear discharge, ear pain, rhinorrhea and sore throat.    Respiratory:  Negative for cough, chest tightness and shortness of breath.    Cardiovascular:  Negative for chest pain.   Gastrointestinal:  Negative for diarrhea, nausea and vomiting.   Genitourinary:  Negative for dysuria.   Skin:  Positive for wound (left lower extremity). Negative for rash.   Allergic/Immunologic: Negative for environmental allergies and food allergies.   Neurological:  Negative for dizziness and headaches.       PAST MEDICAL HISTORY         Diagnosis Date    COPD (chronic obstructive pulmonary disease) ([facility])     GERD (gastroesophageal reflux disease)     Schizoaffective disorder, bipolar type with good prognostic features ([facility])     TIA (transient ischemic attack)        SURGICALHISTORY     Patient  has a past surgical history that includes Tonsillectomy; Tubal ligation; Appendectomy; Carpal tunnel release (Right, 03/2019); and pr bx of breast,vacuum asst,image guide

## 2024-04-04 DIAGNOSIS — F51.04 PSYCHOPHYSIOLOGICAL INSOMNIA: ICD-10-CM

## 2024-04-04 RX ORDER — MIRTAZAPINE 45 MG/1
45 TABLET, FILM COATED ORAL NIGHTLY
Qty: 90 TABLET | Refills: 1 | Status: SHIPPED | OUTPATIENT
Start: 2024-04-04

## 2024-04-24 NOTE — PROGRESS NOTES
[facility]  [address]  [place] [address]  Phone:  [phone]       2024    TELEHEALTH EVALUATION    HPI:    Denise Benoit (:  1965) has requested an audio/video evaluation for the following concern(s):  dizziness    She has been feeling more dizzy and last week at work nearly passed out.  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  No associated headache.  No ear pain or fullness.  No new medications.  No chest pain or palpitations.  She feels like she's eating enough.  She is drinking 6-30 oz cups of water per day.  Her weight is up to 35 lbs.    She's concerned as she has no sex drive and it's upsetting her .  She is more concerned on working and paying the bills.  When she comes home from work she's tired.    Review of Systems   Constitutional:  Negative for fever.   HENT:  Negative for ear discharge and ear pain.    Respiratory:  Negative for shortness of breath.    Cardiovascular:  Negative for chest pain.   Neurological:  Positive for dizziness, light-headedness and headaches.       Prior to Visit Medications    Medication Sig Taking? Authorizing Provider   meclizine (ANTIVERT) 25 MG tablet Take 1 tablet by mouth 3 times daily as needed for Dizziness Yes Makayla Fofana MD   Flibanserin 100 MG TABS Take 100 mg by mouth daily Yes Makayla Fofana MD   mirtazapine (REMERON) 45 MG tablet Take 1 tablet by mouth nightly  Makayla Fofana MD   Potassium 99 MG TABS Take by mouth  Provider, MD Melina   amitriptyline (ELAVIL) 50 MG tablet Take 1 tablet by mouth nightly  Makayla Fofana MD   rOPINIRole (REQUIP) 0.5 MG tablet Take 1 tablet by mouth 3 times daily  Makayla Fofana MD   SUMAtriptan (IMITREX) 50 MG tablet Take 1 tablet by mouth once as needed for Migraine  Makayla Fofana MD   megestrol (MEGACE) 40 MG tablet Take 1 tablet by mouth daily  Makayla Fofana MD   oxybutynin (DITROPAN-XL) 5

## 2024-04-29 ENCOUNTER — TELEMEDICINE (OUTPATIENT)
Dept: FAMILY MEDICINE CLINIC | Age: 59
End: 2024-04-29
Payer: MEDICAID

## 2024-04-29 DIAGNOSIS — F32.A ANXIETY AND DEPRESSION: ICD-10-CM

## 2024-04-29 DIAGNOSIS — R51.9 CHRONIC DAILY HEADACHE: ICD-10-CM

## 2024-04-29 DIAGNOSIS — N39.41 URGE INCONTINENCE: ICD-10-CM

## 2024-04-29 DIAGNOSIS — F41.9 ANXIETY AND DEPRESSION: ICD-10-CM

## 2024-04-29 DIAGNOSIS — R51.9 NEW ONSET HEADACHE: ICD-10-CM

## 2024-04-29 DIAGNOSIS — R68.82 LOW LIBIDO: ICD-10-CM

## 2024-04-29 DIAGNOSIS — R42 VERTIGO: Primary | ICD-10-CM

## 2024-04-29 PROCEDURE — 99214 OFFICE O/P EST MOD 30 MIN: CPT | Performed by: FAMILY MEDICINE

## 2024-04-29 RX ORDER — SUMATRIPTAN 50 MG/1
50 TABLET, FILM COATED ORAL
Qty: 9 TABLET | Refills: 2 | Status: SHIPPED | OUTPATIENT
Start: 2024-04-29 | End: 2024-04-29

## 2024-04-29 RX ORDER — ROPINIROLE 0.5 MG/1
0.5 TABLET, FILM COATED ORAL 3 TIMES DAILY
Qty: 90 TABLET | Refills: 2 | Status: SHIPPED | OUTPATIENT
Start: 2024-04-29

## 2024-04-29 RX ORDER — MEGESTROL ACETATE 40 MG/1
40 TABLET ORAL DAILY
Qty: 90 TABLET | Refills: 1 | Status: SHIPPED | OUTPATIENT
Start: 2024-04-29

## 2024-04-29 RX ORDER — OXYBUTYNIN CHLORIDE 5 MG/1
5 TABLET, EXTENDED RELEASE ORAL DAILY
Qty: 90 TABLET | Refills: 1 | Status: SHIPPED | OUTPATIENT
Start: 2024-04-29

## 2024-04-29 RX ORDER — AMITRIPTYLINE HYDROCHLORIDE 50 MG/1
50 TABLET, FILM COATED ORAL NIGHTLY
Qty: 90 TABLET | Refills: 1 | Status: SHIPPED | OUTPATIENT
Start: 2024-04-29

## 2024-04-29 RX ORDER — MECLIZINE HYDROCHLORIDE 25 MG/1
25 TABLET ORAL 3 TIMES DAILY PRN
Qty: 30 TABLET | Refills: 2 | Status: SHIPPED | OUTPATIENT
Start: 2024-04-29 | End: 2024-05-09

## 2024-04-29 ASSESSMENT — ENCOUNTER SYMPTOMS: SHORTNESS OF BREATH: 0

## 2024-05-21 ENCOUNTER — TELEPHONE (OUTPATIENT)
Dept: FAMILY MEDICINE CLINIC | Age: 59
End: 2024-05-21

## 2024-05-21 DIAGNOSIS — R68.82 LOW LIBIDO: Primary | ICD-10-CM

## 2024-05-21 NOTE — TELEPHONE ENCOUNTER
Does she wants to get labs done to see if she needs estrogen or testosterone supplementation?  If so, will order estrogen and testosterone.

## 2024-05-21 NOTE — TELEPHONE ENCOUNTER
----- Message from Makayla Fofana MD sent at 5/21/2024 12:24 PM EDT -----  Patient is looking for a medication to help with low libido.  Her insurance will not pay for Addyi.  Would she be willing to have labs done to check her estrogen and testosterone levels?

## 2024-05-22 ENCOUNTER — HOSPITAL ENCOUNTER (OUTPATIENT)
Age: 59
Discharge: HOME | End: 2024-05-22
Payer: MEDICAID

## 2024-05-22 DIAGNOSIS — R68.82 LOW LIBIDO: ICD-10-CM

## 2024-05-22 PROCEDURE — 82671 ASSAY OF ESTROGENS: CPT

## 2024-05-22 PROCEDURE — 36415 COLL VENOUS BLD VENIPUNCTURE: CPT

## 2024-05-22 PROCEDURE — 84403 ASSAY OF TOTAL TESTOSTERONE: CPT

## 2024-05-24 LAB — TESTOST SERPL-MCNC: 3 NG/DL

## 2024-05-29 ENCOUNTER — TELEPHONE (OUTPATIENT)
Dept: FAMILY MEDICINE CLINIC | Age: 59
End: 2024-05-29

## 2024-05-29 LAB — ESTROGENS, FRACTIONATED, SERUM: NORMAL

## 2024-05-29 NOTE — TELEPHONE ENCOUNTER
Labs did show likely menopause.  Will keep result in Dr Fofana inbox to see if she has further recommendation

## 2024-06-20 ENCOUNTER — TELEPHONE (OUTPATIENT)
Dept: FAMILY MEDICINE CLINIC | Age: 59
End: 2024-06-20

## 2024-06-20 NOTE — TELEPHONE ENCOUNTER
----- Message from Chitra Shannan Wang sent at 6/20/2024 10:27 AM EDT -----  Regarding: ECC Results Request  ECC Results Request    Which lab or imaging result is the patient calling about:     Which provider ordered the test? Makayla Fofana MD    Was this a Non-[facility] Provider: No    Date the test was preformed (MM/DD/YYYY): a week ago (cannot remember the exact date)  --------------------------------------------------------------------------------------------------------------------------    Relationship to Patient: Self     Call Back Info: OK to leave message on voicemail  Preferred Call Back Number: Phone [phone]

## 2024-08-01 ENCOUNTER — TELEPHONE (OUTPATIENT)
Dept: FAMILY MEDICINE CLINIC | Age: 59
End: 2024-08-01

## 2024-08-01 NOTE — TELEPHONE ENCOUNTER
Patient asking for refill of xanax. She is also asking for norco due to her back being out and OTC meds are not helping. Please advise.

## 2024-08-01 NOTE — TELEPHONE ENCOUNTER
She will need an in office appt for the Norco and Xanax (she hasn't had an appt for her anxiety in 6 months and it's law for controlled substances).

## 2024-08-15 NOTE — PROGRESS NOTES
Skin:        [] No significant exanthematous lesions or discoloration noted on facial skin         [] Abnormal-            Psychiatric:       [] Normal Affect [] No Hallucinations        [] Abnormal-       ASSESSMENT/PLAN:  1. Chronic daily headache  - Her headaches are stable so continue on Amitriptyline.    2. Anxiety and depression  - She has been experiencing more anxiety and agitation so will start on Abilify with her h/o bipolar disorder.  She was encouraged to contact our office with worsening mood or medication side effects.  - ARIPiprazole (ABILIFY) 5 MG tablet; Take 1 tablet by mouth daily  Dispense: 30 tablet; Refill: 2  - ALPRAZolam (XANAX) 0.25 MG tablet; Take 1 tablet by mouth 3 times daily as needed for Sleep for up to 30 days. Max Daily Amount: 0.75 mg  Dispense: 90 tablet; Refill: 0    3. Psychophysiological insomnia  - She sleeps decently with the Remeron but can't sleep without it so will continue on it.      Return in about 1 month (around 9/20/2024) for anxiety, depression.      Denise Benoit, was evaluated through a synchronous (real-time) audio-video encounter. The patient (or guardian if applicable) is aware that this is a billable service, which includes applicable co-pays. This Virtual Visit was conducted with patient's (and/or legal guardian's) consent. Patient identification was verified, and a caregiver was present when appropriate.   The patient was located at Home: [address] [place] [id number]  Provider was located at Facility (Appt Dept): [address] [place] [address]. [facility]  [address]  [place] [address]  Confirm you are appropriately licensed, registered, or certified to deliver care in the [facility] where the patient is located as indicated above. If you are not or unsure, please re-schedule the visit: Yes, I confirm.          --Makayla Fofana MD on 8/20/2024 at 2:58 PM    An electronic signature was used to authenticate this note.

## 2024-08-20 ENCOUNTER — TELEMEDICINE (OUTPATIENT)
Dept: FAMILY MEDICINE CLINIC | Age: 59
End: 2024-08-20
Payer: MEDICAID

## 2024-08-20 DIAGNOSIS — F51.04 PSYCHOPHYSIOLOGICAL INSOMNIA: ICD-10-CM

## 2024-08-20 DIAGNOSIS — F32.A ANXIETY AND DEPRESSION: ICD-10-CM

## 2024-08-20 DIAGNOSIS — F41.9 ANXIETY AND DEPRESSION: ICD-10-CM

## 2024-08-20 DIAGNOSIS — R51.9 CHRONIC DAILY HEADACHE: Primary | ICD-10-CM

## 2024-08-20 PROCEDURE — 99214 OFFICE O/P EST MOD 30 MIN: CPT | Performed by: FAMILY MEDICINE

## 2024-08-20 RX ORDER — ARIPIPRAZOLE 5 MG/1
5 TABLET ORAL DAILY
Qty: 30 TABLET | Refills: 2 | Status: SHIPPED | OUTPATIENT
Start: 2024-08-20

## 2024-08-20 RX ORDER — ALPRAZOLAM 0.25 MG/1
0.25 TABLET ORAL 3 TIMES DAILY PRN
Qty: 90 TABLET | Refills: 0 | Status: SHIPPED | OUTPATIENT
Start: 2024-08-20 | End: 2024-09-19

## 2024-08-21 ENCOUNTER — TELEPHONE (OUTPATIENT)
Dept: FAMILY MEDICINE CLINIC | Age: 59
End: 2024-08-21

## 2024-08-21 NOTE — TELEPHONE ENCOUNTER
Norco should not be a long-term pain medication.  If her pain persists I recommend we set her up with pain management.

## 2024-08-23 DIAGNOSIS — M54.42 CHRONIC MIDLINE LOW BACK PAIN WITH BILATERAL SCIATICA: ICD-10-CM

## 2024-08-23 DIAGNOSIS — M54.41 CHRONIC MIDLINE LOW BACK PAIN WITH BILATERAL SCIATICA: ICD-10-CM

## 2024-08-23 DIAGNOSIS — G89.29 CHRONIC MIDLINE LOW BACK PAIN WITH BILATERAL SCIATICA: ICD-10-CM

## 2024-08-23 RX ORDER — HYDROCODONE BITARTRATE AND ACETAMINOPHEN 5; 325 MG/1; MG/1
1 TABLET ORAL EVERY 8 HOURS PRN
Qty: 15 TABLET | Refills: 0 | Status: SHIPPED | OUTPATIENT
Start: 2024-08-23 | End: 2024-08-28

## 2024-08-23 NOTE — TELEPHONE ENCOUNTER
Pt states she only uses it if her back goes out and would really like a prescription for Norco. Please advise.

## 2024-11-07 NOTE — TELEPHONE ENCOUNTER
PT Evaluation     Today's date: 2024  Patient name: Natalya Mercado  : 1985  MRN: [id number]  Referring provider: Alyssa Barrett*  Dx:   Encounter Diagnosis     ICD-10-CM    1. Pelvic pain in pregnancy  O26.899 Ambulatory Referral to Physical Therapy    R10.2           Start Time: 925  Stop Time: 1005  Total time in clinic (min): 40 minutes    Assessment  Impairments: abnormal gait, abnormal or restricted ROM, activity intolerance, impaired physical strength, lacks appropriate home exercise program and pain with function    Assessment details: The patient is a 39 year old female with complaints of pelvic pain during pregnancy. She presents with significant tenderness to pelvic girdle including SIJ and hip musculature. Positive neural tension testing with likely bilateral sciatica contributing to pain. She presents with weakness to core and hip musculature. 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 She would benefit from pelvic floor physical therapy to help reduce/manage symptoms, address impairments and maximize function and quality of life upon discharge. Therapeutic activities performed upon examination included education regarding pelvic floor anatomy, explanation of exam technique, explanation of exam findings and discussion of treatment plan as well as expectations of the patient to emphasize the importance of compliance and adherence to physical therapy visits. She will be given updated HEP throughout episode of care.     Education provided today:  Pelvic floor anatomy and function  Physiology/relationship of abdominal canister and pelvis/pelvic organs/pelvic floor muscles  Diaphragm and Diaphragmatic breathing  Hormonal and MSK changes during pregnancy   Post partum posture and MSK changes  Bed mobility   Transfers   Sleep positioning  PT exam and course of treatment                Goals  ST. The patient will reduce pelvic pain by to 3/10  Pended megestrol tablets. Please advise. "with standing and walking for improved QoL.   2. The patient will maintain bilateral hip/pelvic girdle strength to help with her mobility and transfers.    LT. The patient will be independent with HEP upon discharge to help manage her symptoms and maintain her activity levels upon discharge.   2. The patient will have minimal pain with transfers and bed mobility utilizing protective joint strategies for movement upon discharge.   3. The patient will stand for at least 30 minutes without limitation from pelvic pain to lecture.      Plan  Patient would benefit from: skilled physical therapy    Frequency: 1x week  Duration in weeks: 4  Plan of Care beginning date: 2024  Plan of Care expiration date: 2024  Treatment plan discussed with: patient        PT Pelvic Floor Subjective:   History of Present Illness:   Patient is a 39 y.o. presenting to physical therapy with complaints of pelvic pain during pregnancy ongoing for about a month. Patient reports pelvic pain during her first pregnancy as well. She reports some days are worse than others and can walk shorter or longer distances depending on the day. She can have significant levels of pelvic pain describing it as \"pelvis is snapping half\" preventing her from walking or prolonged standing. 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 She also reports occasional numbness in the upper thighs.      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[employer])  Diet and Exercise:      Exercise type: walking    Walking as often as possible   Yoga ball exercises for pelvic mobility  OB/ gyn History    Gestational History:     Prior Pregnancy: Yes      Number of prior pregnancies: 2    Number of term pregnancies: 1    Delivery Type:  section      Number of caesarian sections: 1    " Delivery Complications:  7 yo daughter   Bladder Function:     Voiding Difficulties negative for: urgency and frequent urination       Voiding Difficulties comments:     Urinary leakage: no urine leakage  Bowel Function:     Bowel Function comments:  Has started taking iron every other day to regulate bowel movements    Bowel frequency: every 2 days    Stool softener use: no stool softeners  Pain:     Current pain rating:  3    At best pain rating:  3    At worst pain ratin    Location:  Bilateral anterior hip/groin and pubic bone    Quality:  Numbness (tight/constricting aching pain)    Aggravating factors:  Walking, prolonged positions and sit to stand transition (prolonged standing <10 min; stair climbing 13 stairs in house)    Relieving factors:  Change in position, medications and rest (laying on left side; Tylenol prn)  Patient Goals:     Patient goals for therapy:  Improved comfort, improved pain management, improved quality of life and decreased pain    Other patient goals:  Reduce pain and function for ADLs and work until delivery      Objective     Palpation   Left   Hypertonic in the gluteus medius, iliopsoas and piriformis.   Tenderness of the gluteus medius, iliopsoas and piriformis.     Right   Hypertonic in the gluteus medius, iliopsoas and piriformis.   Tenderness of the gluteus medius, iliopsoas and piriformis.     Tenderness     Left Hip   Tenderness in the PSIS, greater trochanter and sacroiliac joint.     Right Hip   Tenderness in the PSIS, greater trochanter and sacroiliac joint.     Passive Range of Motion   Left Hip   Flexion: 50 degrees with pain  External rotation (90/90): 5 degrees with pain  Internal rotation (90/90): 5 degrees with pain    Right Hip   Flexion: 75 degrees with pain  External rotation (90/90): 20 degrees with pain  Internal rotation (90/90): 10 degrees with pain    Strength/Myotome Testing     Left Hip   Planes of Motion   Flexion: 4  Abduction: 3- (pain)  External  "rotation: 3  Internal rotation: 3-    Right Hip   Planes of Motion   Flexion: 4  Abduction: 3- (pain)  External rotation: 3-  Internal rotation: 3-    Left Knee   Flexion: 5  Extension: 5    Right Knee   Flexion: 5  Extension: 5    Left Ankle/Foot   Dorsiflexion: 5    Right Ankle/Foot   Dorsiflexion: 5    Tests     Lumbar     Left   Positive passive SLR.     Right   Positive passive SLR.                    Precautions: monitor vitals; watch supine hypotension,  scheduled 12/3 (has 7 yo daughter)      [facility] HEP:  Daily Treatment Diary     Manual              Hip stretching             STM to SIJ, piriformis, glutes, hip flexors nv            SIJ Kinesiotaping                 NMR             Abdominal bracing: TA seated 10x            PFMC nv            TA + hip add isometrics nv            TA + hip abd isometrics nv            Clamshells             TE             Hamstring stretch seated 3x10\" b/l            Piriformis stretch seated 2' b/l            Pball pelvic circles **            Pball pelvic tilts **            Pball adductor stretch nv            TA + TB low row             TA + TB paloff press             TA + scap retractions             TA + TB mid rows                          TA             Transfer training reviewed            Patient education Done - see assessment                                          Vitals             BP             HR             SPO2                              "

## 2024-11-13 ENCOUNTER — TELEPHONE (OUTPATIENT)
Dept: FAMILY MEDICINE CLINIC | Age: 59
End: 2024-11-13

## 2024-11-13 ASSESSMENT — ENCOUNTER SYMPTOMS: COUGH: 1

## 2024-11-13 NOTE — PROGRESS NOTES
[facility]  [address]  [place] [address]  Phone:  [phone]       2024    TELEHEALTH EVALUATION    HPI:    Denise Benoit (:  1965) has requested an audio/video evaluation for the following concern(s):  cough and congestion    She's had a sore throat and congestion for the past week.  She's very run down.  OTC medications aren't helping.  She's having more parker horses.    Her  passed away last week from lung cancer and she's a mess.  She's not eating so would like her Megace increased.     She has been having a lot of pain in her lower back that is not improving with chiropractic manipulation.  She can't afford testing and OTC medications aren't helping.      Review of Systems   Constitutional:  Negative for fever.   HENT:  Positive for congestion.    Respiratory:  Positive for cough.        Prior to Visit Medications    Medication Sig Taking? Authorizing Provider   ARIPiprazole (ABILIFY) 10 MG tablet Take 1 tablet by mouth daily Yes Makayla Fofana MD   amoxicillin (AMOXIL) 500 MG capsule Take 1 capsule by mouth 2 times daily for 10 days Yes Makayla Fofana MD   fluconazole (DIFLUCAN) 150 MG tablet Take 1 tablet by mouth every 72 hours for 6 days Yes Makayla Fofana MD   benzonatate (TESSALON) 100 MG capsule Take 1 capsule by mouth 3 times daily as needed for Cough Yes Makayla Fofana MD   amitriptyline (ELAVIL) 50 MG tablet Take 1 tablet by mouth nightly Yes Makayla Fofana MD   megestrol (MEGACE) 40 MG tablet Take 1 tablet by mouth 2 times daily Yes Makayla Fofana MD   HYDROcodone-acetaminophen (NORCO) 5-325 MG per tablet Take 1 tablet by mouth every 8 hours as needed for Pain for up to 5 days. Max Daily Amount: 3 tablets Yes Makayla Fofana MD   Flibanserin 100 MG TABS Take 100 mg by mouth daily  Makayla Fofana MD   SUMAtriptan (IMITREX) 50 MG tablet Take 1 tablet by mouth once as needed for Migraine  Ct

## 2024-11-13 NOTE — TELEPHONE ENCOUNTER
----- Message from Juan KIMBALL sent at 11/13/2024 12:25 PM EST -----  Regarding: ECC Escalation To Practice  ECC Escalation To Practice      Type of Escalation: Acute Care Symptom  --------------------------------------------------------------------------------------------------------------------------    Information for Provider:  Patient is looking for appointment for: Symptom Cough/ cold symptom  Reasons for Message: Unable to reach practice     Additional Information Patient is coughing, sick, and needs her medication  --------------------------------------------------------------------------------------------------------------------------    Relationship to Patient: Self     Call Back Info: OK to leave message on voicemail  Preferred Call Back Number: Phone [phone]

## 2024-11-14 ENCOUNTER — TELEMEDICINE (OUTPATIENT)
Dept: FAMILY MEDICINE CLINIC | Age: 59
End: 2024-11-14
Payer: MEDICAID

## 2024-11-14 DIAGNOSIS — F41.9 ANXIETY AND DEPRESSION: ICD-10-CM

## 2024-11-14 DIAGNOSIS — R51.9 CHRONIC DAILY HEADACHE: ICD-10-CM

## 2024-11-14 DIAGNOSIS — J01.90 ACUTE BACTERIAL SINUSITIS: Primary | ICD-10-CM

## 2024-11-14 DIAGNOSIS — B96.89 ACUTE BACTERIAL SINUSITIS: Primary | ICD-10-CM

## 2024-11-14 DIAGNOSIS — M54.42 CHRONIC MIDLINE LOW BACK PAIN WITH BILATERAL SCIATICA: ICD-10-CM

## 2024-11-14 DIAGNOSIS — G89.29 CHRONIC MIDLINE LOW BACK PAIN WITH BILATERAL SCIATICA: ICD-10-CM

## 2024-11-14 DIAGNOSIS — F32.A ANXIETY AND DEPRESSION: ICD-10-CM

## 2024-11-14 DIAGNOSIS — M54.41 CHRONIC MIDLINE LOW BACK PAIN WITH BILATERAL SCIATICA: ICD-10-CM

## 2024-11-14 PROCEDURE — 99214 OFFICE O/P EST MOD 30 MIN: CPT | Performed by: FAMILY MEDICINE

## 2024-11-14 RX ORDER — AMITRIPTYLINE HYDROCHLORIDE 50 MG/1
50 TABLET ORAL NIGHTLY
Qty: 90 TABLET | Refills: 1 | Status: SHIPPED | OUTPATIENT
Start: 2024-11-14

## 2024-11-14 RX ORDER — AMOXICILLIN 500 MG/1
500 CAPSULE ORAL 2 TIMES DAILY
Qty: 20 CAPSULE | Refills: 0 | Status: SHIPPED | OUTPATIENT
Start: 2024-11-14 | End: 2024-11-24

## 2024-11-14 RX ORDER — MEGESTROL ACETATE 40 MG/1
40 TABLET ORAL 2 TIMES DAILY
Qty: 180 TABLET | Refills: 1 | Status: SHIPPED | OUTPATIENT
Start: 2024-11-14

## 2024-11-14 RX ORDER — FLUCONAZOLE 150 MG/1
150 TABLET ORAL
Qty: 2 TABLET | Refills: 0 | Status: SHIPPED | OUTPATIENT
Start: 2024-11-14 | End: 2024-11-20

## 2024-11-14 RX ORDER — ARIPIPRAZOLE 10 MG/1
10 TABLET ORAL DAILY
Qty: 90 TABLET | Refills: 0 | Status: SHIPPED | OUTPATIENT
Start: 2024-11-14

## 2024-11-14 RX ORDER — BENZONATATE 100 MG/1
100 CAPSULE ORAL 3 TIMES DAILY PRN
Qty: 30 CAPSULE | Refills: 0 | Status: SHIPPED | OUTPATIENT
Start: 2024-11-14 | End: 2024-11-24

## 2024-11-14 RX ORDER — HYDROCODONE BITARTRATE AND ACETAMINOPHEN 5; 325 MG/1; MG/1
1 TABLET ORAL EVERY 8 HOURS PRN
Qty: 15 TABLET | Refills: 0 | Status: SHIPPED | OUTPATIENT
Start: 2024-11-14 | End: 2024-11-19

## 2024-12-11 ENCOUNTER — TELEPHONE (OUTPATIENT)
Dept: FAMILY MEDICINE CLINIC | Age: 59
End: 2024-12-11

## 2024-12-11 NOTE — TELEPHONE ENCOUNTER
----- Message from Dr. Makayla Fofana MD sent at 12/11/2024 12:11 PM EST -----  Regarding: MyChart  Patient states that she cannot get into her MyChart.  Please call and help her get reconnected.

## 2025-01-07 ENCOUNTER — HOSPITAL ENCOUNTER (EMERGENCY)
Age: 60
Discharge: HOME | End: 2025-01-07
Payer: MEDICAID

## 2025-01-07 VITALS
HEIGHT: 64 IN | TEMPERATURE: 97.8 F | SYSTOLIC BLOOD PRESSURE: 128 MMHG | DIASTOLIC BLOOD PRESSURE: 73 MMHG | WEIGHT: 120 LBS | BODY MASS INDEX: 20.49 KG/M2 | RESPIRATION RATE: 20 BRPM | OXYGEN SATURATION: 100 % | HEART RATE: 84 BPM

## 2025-01-07 DIAGNOSIS — M43.6 TORTICOLLIS: Primary | ICD-10-CM

## 2025-01-07 PROCEDURE — 99213 OFFICE O/P EST LOW 20 MIN: CPT

## 2025-01-07 RX ORDER — PREDNISONE 10 MG/1
TABLET ORAL
Qty: 19 TABLET | Refills: 0 | Status: SHIPPED | OUTPATIENT
Start: 2025-01-07 | End: 2025-01-14

## 2025-01-07 RX ORDER — CYCLOBENZAPRINE HCL 10 MG
10 TABLET ORAL 3 TIMES DAILY PRN
Qty: 30 TABLET | Refills: 0 | Status: SHIPPED | OUTPATIENT
Start: 2025-01-07 | End: 2025-01-17

## 2025-01-07 RX ORDER — IBUPROFEN 600 MG/1
600 TABLET, FILM COATED ORAL 4 TIMES DAILY PRN
Qty: 40 TABLET | Refills: 0 | Status: SHIPPED | OUTPATIENT
Start: 2025-01-07

## 2025-01-07 ASSESSMENT — ENCOUNTER SYMPTOMS
VOMITING: 0
EYE REDNESS: 0
COUGH: 0
SORE THROAT: 0
SHORTNESS OF BREATH: 0
TROUBLE SWALLOWING: 0
EYE DISCHARGE: 0
DIARRHEA: 0
RHINORRHEA: 0
NAUSEA: 0

## 2025-01-07 ASSESSMENT — PAIN DESCRIPTION - ORIENTATION: ORIENTATION: LEFT

## 2025-01-07 ASSESSMENT — PAIN DESCRIPTION - ONSET: ONSET: GRADUAL

## 2025-01-07 ASSESSMENT — PAIN SCALES - GENERAL: PAINLEVEL_OUTOF10: 7

## 2025-01-07 ASSESSMENT — PAIN DESCRIPTION - LOCATION: LOCATION: NECK

## 2025-01-07 ASSESSMENT — PAIN - FUNCTIONAL ASSESSMENT
PAIN_FUNCTIONAL_ASSESSMENT: 0-10
PAIN_FUNCTIONAL_ASSESSMENT: PREVENTS OR INTERFERES SOME ACTIVE ACTIVITIES AND ADLS

## 2025-01-07 ASSESSMENT — PAIN DESCRIPTION - PAIN TYPE: TYPE: ACUTE PAIN

## 2025-01-07 ASSESSMENT — PAIN DESCRIPTION - DESCRIPTORS: DESCRIPTORS: TIGHTNESS

## 2025-01-07 ASSESSMENT — PAIN DESCRIPTION - FREQUENCY: FREQUENCY: CONTINUOUS

## 2025-01-07 NOTE — ED NOTES
Pt co ongoing left sided neck stiffness for the last 3 weeks without injury.      Dio Espinoza, RN  01/07/25 0915

## 2025-01-07 NOTE — ED PROVIDER NOTES
[facility]  Urgent Care Encounter      CHIEF COMPLAINT       Chief Complaint   Patient presents with    Neck Pain     Left sided neck stiffness for the last 3 weeks       Nurses Notes reviewed and I agree except as noted in the HPI.  HISTORY OF PRESENT ILLNESS   Denise Benoit is a 59 y.o. female who presents urgent care for evaluation of neck pain.  Patient reports onset of symptoms approximately 3 weeks ago.  Reporting left-sided neck stiffness.  Reports significant amount of decreased range of motion.  Reports when she presses on that left side of her neck towards the back super stiff and painful.  Rating discomfort 7 out of 10.    REVIEW OF SYSTEMS     Review of Systems   Constitutional:  Negative for chills, diaphoresis, fatigue and fever.   HENT:  Negative for congestion, ear pain, rhinorrhea, sore throat and trouble swallowing.    Eyes:  Negative for discharge and redness.   Respiratory:  Negative for cough and shortness of breath.    Cardiovascular:  Negative for chest pain.   Gastrointestinal:  Negative for diarrhea, nausea and vomiting.   Genitourinary:  Negative for decreased urine volume.   Musculoskeletal:  Positive for neck pain. Negative for neck stiffness.   Skin:  Negative for rash.   Neurological:  Negative for headaches.   Hematological:  Negative for adenopathy.   Psychiatric/Behavioral:  Negative for sleep disturbance.        PAST MEDICAL HISTORY         Diagnosis Date    COPD (chronic obstructive pulmonary disease) (HCC)     GERD (gastroesophageal reflux disease)     Schizoaffective disorder, bipolar type with good prognostic features ([facility])     TIA (transient ischemic attack)        SURGICAL HISTORY     Patient  has a past surgical history that includes Tonsillectomy; Tubal ligation; Appendectomy; Carpal tunnel release (Right, 03/2019); and pr bx of breast,vacuum asst,image guide (Left).    CURRENT MEDICATIONS       Discharge Medication List as of 1/7/2025  9:25 AM

## 2025-01-07 NOTE — ED NOTES
PT GIVEN DISCHARGE INSTRUCTIONS, VERBALIZES UNDERSTANDING.  PT ASSESSMENT UNCHANGED, DISCHARGED IN STABLE CONDITION.        Dio Espinoza RN  01/07/25 8142

## 2025-01-07 NOTE — DISCHARGE INSTRUCTIONS
Prescribed prednisone taper, ibuprofen 600 mg every 6 hours, and Flexeril 3 times daily as needed for neck tightness and stiffness.    Flexeril is a muscle relaxer and can cause severe drowsiness.  Do not operate heavy machinery or drive vehicle until you know how it makes you feel.    Rest, apply heat to help muscle relax.    Follow-up with primary care provider in 3 to 5 days if symptoms worsen or fail to improve.

## 2025-01-14 NOTE — PROGRESS NOTES
[facility]  [address]  [place] [address]  Phone:  [phone]       1/15/2025    TELEHEALTH EVALUATION    HPI:    Denise Benoit (:  1965) has requested an audio/video evaluation for the following concern(s):  ER f/u    She was seen in the ER on 25 with left-sided neck stiffness that began 3 weeks prior with no inciting injury.  Symptoms were consistent with torticollis so she was treated with a Prednisone taper, Flexeril, and Ibuprofen.  This isn't helping, nor is IcyHot or heat.  She cannot move her head to the left without it hurting.  She has point tenderness in her left cervical paraspinal area.    She is ready to quit smoking (currently at 1.5 PPD) but would like to try quitting with Chantix.    Review of Systems   Musculoskeletal:  Positive for myalgias, neck pain and neck stiffness.       Prior to Visit Medications    Medication Sig Taking? Authorizing Provider   baclofen (LIORESAL) 10 MG tablet Take 1 tablet by mouth 3 times daily for 10 days Yes Makayla Fofana MD   Nutritional Supplements (NUTRITIONAL DRINK) LIQD Drink 1-2 cans/day.  Strawberry flavor. Yes Makayla Fofana MD   cyclobenzaprine (FLEXERIL) 10 MG tablet Take 1 tablet by mouth 3 times daily as needed for Muscle spasms  Yesenia Jauregui APRN - CNP   ibuprofen (ADVIL;MOTRIN) 600 MG tablet Take 1 tablet by mouth 4 times daily as needed for Pain  Yesenia Jauregui APRN - CNP   mirtazapine (REMERON) 45 MG tablet Take 1 tablet by mouth nightly  Makayla Fofana MD   amitriptyline (ELAVIL) 75 MG tablet Take 1 tablet by mouth nightly  Makayla Fofana MD   ARIPiprazole (ABILIFY) 10 MG tablet Take 1 tablet by mouth daily  Makayla Fofana MD   megestrol (MEGACE) 40 MG tablet Take 1 tablet by mouth 2 times daily  Patient taking differently: Take 1 tablet by mouth 3 times daily  Makayla Fofana MD   Flibanserin 100 MG TABS Take 100 mg by mouth daily  Makayla Fofana MD

## 2025-01-15 ENCOUNTER — TELEMEDICINE (OUTPATIENT)
Dept: FAMILY MEDICINE CLINIC | Age: 60
End: 2025-01-15
Payer: MEDICAID

## 2025-01-15 DIAGNOSIS — R63.6 UNDERWEIGHT: ICD-10-CM

## 2025-01-15 DIAGNOSIS — M43.6 TORTICOLLIS: Primary | ICD-10-CM

## 2025-01-15 DIAGNOSIS — F17.200 CURRENT EVERY DAY SMOKER: ICD-10-CM

## 2025-01-15 PROCEDURE — 99214 OFFICE O/P EST MOD 30 MIN: CPT | Performed by: FAMILY MEDICINE

## 2025-01-15 RX ORDER — MEDICAL SUPPLY, MISCELLANEOUS
EACH MISCELLANEOUS
Qty: 60 EACH | Refills: 2 | Status: SHIPPED | OUTPATIENT
Start: 2025-01-15

## 2025-01-15 RX ORDER — VARENICLINE TARTRATE 1 MG/1
1 TABLET, FILM COATED ORAL 2 TIMES DAILY
Qty: 60 TABLET | Refills: 1 | Status: SHIPPED | OUTPATIENT
Start: 2025-01-15

## 2025-01-15 RX ORDER — BACLOFEN 10 MG/1
10 TABLET ORAL 3 TIMES DAILY
Qty: 30 TABLET | Refills: 0 | Status: SHIPPED | OUTPATIENT
Start: 2025-01-15 | End: 2025-01-25

## 2025-01-15 SDOH — ECONOMIC STABILITY: FOOD INSECURITY: WITHIN THE PAST 12 MONTHS, THE FOOD YOU BOUGHT JUST DIDN'T LAST AND YOU DIDN'T HAVE MONEY TO GET MORE.: NEVER TRUE

## 2025-01-15 SDOH — ECONOMIC STABILITY: FOOD INSECURITY: WITHIN THE PAST 12 MONTHS, YOU WORRIED THAT YOUR FOOD WOULD RUN OUT BEFORE YOU GOT MONEY TO BUY MORE.: NEVER TRUE

## 2025-01-15 ASSESSMENT — PATIENT HEALTH QUESTIONNAIRE - PHQ9
SUM OF ALL RESPONSES TO PHQ QUESTIONS 1-9: 1
10. IF YOU CHECKED OFF ANY PROBLEMS, HOW DIFFICULT HAVE THESE PROBLEMS MADE IT FOR YOU TO DO YOUR WORK, TAKE CARE OF THINGS AT HOME, OR GET ALONG WITH OTHER PEOPLE: NOT DIFFICULT AT ALL
5. POOR APPETITE OR OVEREATING: NOT AT ALL
7. TROUBLE CONCENTRATING ON THINGS, SUCH AS READING THE NEWSPAPER OR WATCHING TELEVISION: NOT AT ALL
SUM OF ALL RESPONSES TO PHQ QUESTIONS 1-9: 1
9. THOUGHTS THAT YOU WOULD BE BETTER OFF DEAD, OR OF HURTING YOURSELF: NOT AT ALL
6. FEELING BAD ABOUT YOURSELF - OR THAT YOU ARE A FAILURE OR HAVE LET YOURSELF OR YOUR FAMILY DOWN: NOT AT ALL
8. MOVING OR SPEAKING SO SLOWLY THAT OTHER PEOPLE COULD HAVE NOTICED. OR THE OPPOSITE, BEING SO FIGETY OR RESTLESS THAT YOU HAVE BEEN MOVING AROUND A LOT MORE THAN USUAL: NOT AT ALL
4. FEELING TIRED OR HAVING LITTLE ENERGY: NOT AT ALL
2. FEELING DOWN, DEPRESSED OR HOPELESS: SEVERAL DAYS
3. TROUBLE FALLING OR STAYING ASLEEP: NOT AT ALL

## 2025-01-30 NOTE — PROGRESS NOTES
[facility]  [address]  [place] [address]  Phone:  [phone]          Name: Denise Benoit  : 1965    Chief Complaint   Patient presents with    Neck Pain     X 2 month no injury       HPI:     History of Present Illness  The patient is a 59-year-old female here today for follow-up of anxiety and depression and for evaluation of neck pain.    Her  Marlon passed away in September but Denise has been maintaining her daily routine, including work. She has been engaging in social activities with her children.  She expresses concern about potential nightmares associated with Chantix use, given her current sleep pattern of approximately 4 hours per night. She has transitioned to decaffeinated coffee in an attempt to improve her sleep quality.    She has been experiencing persistent neck pain for the past 2 months, localized to the left side. The onset of this pain is uncertain, but she speculates it may be stress-related. The pain intensifies during head movements, particularly when turning or looking upwards. She reports no associated shoulder discomfort. Her current pain management regimen includes baclofen, which provides some relief, and ibuprofen 800 mg. She has also been self-medicating with Vicodin, which she finds beneficial in managing her symptoms and maintaining her functionality at work.    She continues to take Ditropan for overactive bladder and Requip for restless legs. She does not require Imitrex for migraines.        Current Outpatient Medications:     HYDROcodone-acetaminophen (NORCO) 5-325 MG per tablet, Take 1 tablet by mouth every 8 hours as needed for Pain for up to 5 days. Max Daily Amount: 3 tablets, Disp: 15 tablet, Rfl: 0    ARIPiprazole (ABILIFY) 10 MG tablet, Take 1 tablet by mouth daily, Disp: 90 tablet, Rfl: 0    oxyBUTYnin (DITROPAN-XL) 5 MG extended release tablet, Take 1 tablet by mouth daily, Disp: 90 tablet, Rfl: 1    rOPINIRole (REQUIP) 0.5 MG

## 2025-02-03 ENCOUNTER — OFFICE VISIT (OUTPATIENT)
Dept: FAMILY MEDICINE CLINIC | Age: 60
End: 2025-02-03
Payer: MEDICAID

## 2025-02-03 VITALS
SYSTOLIC BLOOD PRESSURE: 110 MMHG | WEIGHT: 124 LBS | HEART RATE: 80 BPM | BODY MASS INDEX: 21.28 KG/M2 | DIASTOLIC BLOOD PRESSURE: 62 MMHG | RESPIRATION RATE: 16 BRPM | TEMPERATURE: 97 F

## 2025-02-03 DIAGNOSIS — G89.29 CHRONIC NECK PAIN: Primary | ICD-10-CM

## 2025-02-03 DIAGNOSIS — Z71.6 ENCOUNTER FOR SMOKING CESSATION COUNSELING: ICD-10-CM

## 2025-02-03 DIAGNOSIS — F41.9 ANXIETY AND DEPRESSION: ICD-10-CM

## 2025-02-03 DIAGNOSIS — F32.A ANXIETY AND DEPRESSION: ICD-10-CM

## 2025-02-03 DIAGNOSIS — N39.41 URGE INCONTINENCE: ICD-10-CM

## 2025-02-03 DIAGNOSIS — M54.2 CHRONIC NECK PAIN: Primary | ICD-10-CM

## 2025-02-03 PROCEDURE — 99214 OFFICE O/P EST MOD 30 MIN: CPT | Performed by: FAMILY MEDICINE

## 2025-02-03 RX ORDER — BUPROPION HYDROCHLORIDE 150 MG/1
150 TABLET ORAL EVERY MORNING
Qty: 90 TABLET | Refills: 0 | Status: SHIPPED | OUTPATIENT
Start: 2025-02-03

## 2025-02-03 RX ORDER — OXYBUTYNIN CHLORIDE 5 MG/1
5 TABLET, EXTENDED RELEASE ORAL DAILY
Qty: 90 TABLET | Refills: 1 | Status: SHIPPED | OUTPATIENT
Start: 2025-02-03

## 2025-02-03 RX ORDER — ROPINIROLE 0.5 MG/1
0.5 TABLET, FILM COATED ORAL 3 TIMES DAILY
Qty: 90 TABLET | Refills: 2 | Status: SHIPPED | OUTPATIENT
Start: 2025-02-03

## 2025-02-03 RX ORDER — ARIPIPRAZOLE 10 MG/1
10 TABLET ORAL DAILY
Qty: 90 TABLET | Refills: 0 | Status: SHIPPED | OUTPATIENT
Start: 2025-02-03

## 2025-02-03 RX ORDER — HYDROCODONE BITARTRATE AND ACETAMINOPHEN 5; 325 MG/1; MG/1
1 TABLET ORAL EVERY 8 HOURS PRN
Qty: 15 TABLET | Refills: 0 | Status: SHIPPED | OUTPATIENT
Start: 2025-02-03 | End: 2025-02-08

## 2025-02-03 RX ORDER — ALPRAZOLAM 0.5 MG
0.5 TABLET ORAL NIGHTLY PRN
Qty: 30 TABLET | Refills: 0 | Status: SHIPPED | OUTPATIENT
Start: 2025-02-03 | End: 2025-03-05

## 2025-04-08 DIAGNOSIS — F41.9 ANXIETY AND DEPRESSION: ICD-10-CM

## 2025-04-08 DIAGNOSIS — R51.9 CHRONIC DAILY HEADACHE: ICD-10-CM

## 2025-04-08 DIAGNOSIS — F32.A ANXIETY AND DEPRESSION: ICD-10-CM

## 2025-04-08 DIAGNOSIS — F51.04 PSYCHOPHYSIOLOGICAL INSOMNIA: ICD-10-CM

## 2025-04-08 RX ORDER — AMITRIPTYLINE HYDROCHLORIDE 75 MG/1
75 TABLET ORAL NIGHTLY
Qty: 90 TABLET | Refills: 0 | Status: SHIPPED | OUTPATIENT
Start: 2025-04-08

## 2025-04-08 RX ORDER — MIRTAZAPINE 45 MG/1
45 TABLET, FILM COATED ORAL NIGHTLY
Qty: 90 TABLET | Refills: 0 | Status: SHIPPED | OUTPATIENT
Start: 2025-04-08

## 2025-04-08 NOTE — TELEPHONE ENCOUNTER
Last visit- 2/3/2025  Next visit- Visit date not found    Requested Prescriptions     Pending Prescriptions Disp Refills    amitriptyline (ELAVIL) 75 MG tablet 90 tablet 0     Sig: Take 1 tablet by mouth nightly    mirtazapine (REMERON) 45 MG tablet 90 tablet 0     Sig: Take 1 tablet by mouth nightly

## 2025-05-06 DIAGNOSIS — F41.9 ANXIETY AND DEPRESSION: ICD-10-CM

## 2025-05-06 DIAGNOSIS — Z71.6 ENCOUNTER FOR SMOKING CESSATION COUNSELING: ICD-10-CM

## 2025-05-06 DIAGNOSIS — F32.A ANXIETY AND DEPRESSION: ICD-10-CM

## 2025-05-06 RX ORDER — BUPROPION HYDROCHLORIDE 150 MG/1
150 TABLET ORAL EVERY MORNING
Qty: 90 TABLET | Refills: 0 | Status: SHIPPED | OUTPATIENT
Start: 2025-05-06

## 2025-05-06 RX ORDER — ASPIRIN 81 MG/1
TABLET, CHEWABLE ORAL
Qty: 365 TABLET | Refills: 0 | Status: SHIPPED | OUTPATIENT
Start: 2025-05-06

## 2025-05-29 DIAGNOSIS — F32.A ANXIETY AND DEPRESSION: ICD-10-CM

## 2025-05-29 DIAGNOSIS — F41.9 ANXIETY AND DEPRESSION: ICD-10-CM

## 2025-05-29 RX ORDER — ALPRAZOLAM 0.5 MG
0.5 TABLET ORAL NIGHTLY PRN
Qty: 30 TABLET | Refills: 0 | Status: SHIPPED | OUTPATIENT
Start: 2025-05-29 | End: 2025-06-28

## 2025-05-29 NOTE — TELEPHONE ENCOUNTER
Last visit- 2/3/2025  Next visit- Visit date not found    Requested Prescriptions     Pending Prescriptions Disp Refills    ALPRAZolam (XANAX) 0.5 MG tablet 30 tablet 0     Sig: Take 1 tablet by mouth nightly as needed for Sleep or Anxiety for up to 30 days. Max Daily Amount: 0.5 mg

## 2025-06-03 DIAGNOSIS — F32.A ANXIETY AND DEPRESSION: ICD-10-CM

## 2025-06-03 DIAGNOSIS — F41.9 ANXIETY AND DEPRESSION: ICD-10-CM

## 2025-06-03 DIAGNOSIS — Z71.6 ENCOUNTER FOR SMOKING CESSATION COUNSELING: ICD-10-CM

## 2025-06-03 DIAGNOSIS — R51.9 CHRONIC DAILY HEADACHE: ICD-10-CM

## 2025-06-03 DIAGNOSIS — F51.04 PSYCHOPHYSIOLOGICAL INSOMNIA: ICD-10-CM

## 2025-06-03 DIAGNOSIS — N39.41 URGE INCONTINENCE: ICD-10-CM

## 2025-06-03 RX ORDER — AMITRIPTYLINE HYDROCHLORIDE 75 MG/1
TABLET ORAL
Qty: 90 TABLET | Refills: 3 | Status: SHIPPED | OUTPATIENT
Start: 2025-06-03

## 2025-06-03 RX ORDER — BUPROPION HYDROCHLORIDE 150 MG/1
TABLET ORAL
Qty: 90 TABLET | Refills: 3 | Status: SHIPPED | OUTPATIENT
Start: 2025-06-03

## 2025-06-03 RX ORDER — OXYBUTYNIN CHLORIDE 5 MG/1
TABLET, EXTENDED RELEASE ORAL
Qty: 90 TABLET | Refills: 11 | Status: SHIPPED | OUTPATIENT
Start: 2025-06-03

## 2025-06-03 RX ORDER — MEGESTROL ACETATE 40 MG/1
40 TABLET ORAL 3 TIMES DAILY
Qty: 270 TABLET | Refills: 3 | Status: SHIPPED | OUTPATIENT
Start: 2025-06-03 | End: 2025-09-01

## 2025-06-03 RX ORDER — ASPIRIN 81 MG/1
TABLET, COATED ORAL
Qty: 90 TABLET | Refills: 3 | Status: SHIPPED | OUTPATIENT
Start: 2025-06-03

## 2025-06-03 RX ORDER — MIRTAZAPINE 45 MG/1
TABLET, FILM COATED ORAL
Qty: 90 TABLET | Refills: 11 | Status: SHIPPED | OUTPATIENT
Start: 2025-06-03

## 2025-06-03 RX ORDER — ROPINIROLE 0.5 MG/1
TABLET, FILM COATED ORAL
Qty: 270 TABLET | Refills: 11 | Status: SHIPPED | OUTPATIENT
Start: 2025-06-03

## 2025-06-03 NOTE — TELEPHONE ENCOUNTER
Last visit- 2/3/2025  Next visit- Visit date not found    Requested Prescriptions     Pending Prescriptions Disp Refills    rOPINIRole (REQUIP) 0.5 MG tablet [Pharmacy Med Name: ROPINIROLE 0.5 MG TABS 0.5 Tablet] 270 tablet 11     Sig: TAKE 1 TABLET BY MOUTH THREE TIMES DAILY *NEW PRESCRIPTION REQUEST*    mirtazapine (REMERON) 45 MG tablet [Pharmacy Med Name: MIRTAZAPINE 45 MG TABLET 45 Tablet] 90 tablet 11     Sig: TAKE ONE TABLET BY MOUTH AT BEDTIME *NEW PRESCRIPTION REQUEST*    oxyBUTYnin (DITROPAN-XL) 5 MG extended release tablet [Pharmacy Med Name: OXYBUTYNIN ER 5MG TAB 5 Tablet] 90 tablet 11     Sig: TAKE ONE TABLET BY MOUTH ONCE DAILY *NEW PRESCRIPTION REQUEST*

## 2025-07-02 DIAGNOSIS — F32.A ANXIETY AND DEPRESSION: Primary | ICD-10-CM

## 2025-07-02 DIAGNOSIS — F41.9 ANXIETY AND DEPRESSION: Primary | ICD-10-CM

## 2025-07-02 RX ORDER — ALPRAZOLAM 0.25 MG
0.25 TABLET ORAL 3 TIMES DAILY PRN
Qty: 90 TABLET | Refills: 0 | Status: SHIPPED | OUTPATIENT
Start: 2025-07-02 | End: 2025-08-01

## 2025-07-02 NOTE — TELEPHONE ENCOUNTER
Denise called requesting a refill of the below medication which has been pended for you:     Requested Prescriptions     Pending Prescriptions Disp Refills    ALPRAZolam (XANAX) 0.25 MG tablet       Sig: Take 1 tablet by mouth 3 times daily as needed for Anxiety for up to 30 days. Max Daily Amount: 0.75 mg       Last Appointment Date: 2/3/2025  Next Appointment Date: Visit date not found    No Known Allergies

## 2025-08-25 DIAGNOSIS — F32.A ANXIETY AND DEPRESSION: ICD-10-CM

## 2025-08-25 DIAGNOSIS — F41.9 ANXIETY AND DEPRESSION: ICD-10-CM

## 2025-08-25 RX ORDER — ALPRAZOLAM 0.25 MG
0.25 TABLET ORAL 3 TIMES DAILY PRN
Qty: 90 TABLET | Refills: 0 | Status: SHIPPED | OUTPATIENT
Start: 2025-08-25 | End: 2025-09-24